# Patient Record
Sex: FEMALE | Race: BLACK OR AFRICAN AMERICAN | Employment: OTHER | URBAN - METROPOLITAN AREA
[De-identification: names, ages, dates, MRNs, and addresses within clinical notes are randomized per-mention and may not be internally consistent; named-entity substitution may affect disease eponyms.]

---

## 2020-11-18 ENCOUNTER — APPOINTMENT (OUTPATIENT)
Dept: CT IMAGING | Age: 65
DRG: 025 | End: 2020-11-18
Payer: MEDICARE

## 2020-11-18 ENCOUNTER — ANESTHESIA EVENT (OUTPATIENT)
Dept: OPERATING ROOM | Age: 65
DRG: 025 | End: 2020-11-18
Payer: MEDICARE

## 2020-11-18 ENCOUNTER — HOSPITAL ENCOUNTER (INPATIENT)
Age: 65
LOS: 12 days | Discharge: NURSING, REHAB OR LONG-TERM CARE | DRG: 025 | End: 2020-11-30
Attending: EMERGENCY MEDICINE | Admitting: HOSPITALIST
Payer: MEDICARE

## 2020-11-18 ENCOUNTER — APPOINTMENT (OUTPATIENT)
Dept: GENERAL RADIOLOGY | Age: 65
DRG: 025 | End: 2020-11-18
Payer: MEDICARE

## 2020-11-18 ENCOUNTER — ANESTHESIA (OUTPATIENT)
Dept: OPERATING ROOM | Age: 65
DRG: 025 | End: 2020-11-18
Payer: MEDICARE

## 2020-11-18 VITALS — OXYGEN SATURATION: 100 % | SYSTOLIC BLOOD PRESSURE: 110 MMHG | DIASTOLIC BLOOD PRESSURE: 70 MMHG | TEMPERATURE: 93.4 F

## 2020-11-18 PROBLEM — S06.5XAA SUBDURAL HEMATOMA: Status: ACTIVE | Noted: 2020-11-18

## 2020-11-18 PROBLEM — R56.9 SEIZURE (HCC): Status: ACTIVE | Noted: 2020-11-18

## 2020-11-18 LAB
ALBUMIN SERPL-MCNC: 3.5 G/DL (ref 3.5–5.2)
ALP BLD-CCNC: 64 U/L (ref 35–104)
ALT SERPL-CCNC: 25 U/L (ref 0–32)
ANION GAP SERPL CALCULATED.3IONS-SCNC: 10 MMOL/L (ref 7–16)
ANISOCYTOSIS: ABNORMAL
APTT: 34.8 SEC (ref 24.5–35.1)
AST SERPL-CCNC: 29 U/L (ref 0–31)
BASOPHILS ABSOLUTE: 0 E9/L (ref 0–0.2)
BASOPHILS RELATIVE PERCENT: 0.3 % (ref 0–2)
BILIRUB SERPL-MCNC: 0.5 MG/DL (ref 0–1.2)
BUN BLDV-MCNC: 16 MG/DL (ref 8–23)
CALCIUM SERPL-MCNC: 10 MG/DL (ref 8.6–10.2)
CHLORIDE BLD-SCNC: 104 MMOL/L (ref 98–107)
CO2: 25 MMOL/L (ref 22–29)
CREAT SERPL-MCNC: 0.6 MG/DL (ref 0.5–1)
EKG ATRIAL RATE: 76 BPM
EKG P AXIS: 77 DEGREES
EKG P-R INTERVAL: 192 MS
EKG Q-T INTERVAL: 334 MS
EKG QRS DURATION: 66 MS
EKG QTC CALCULATION (BAZETT): 375 MS
EKG R AXIS: 19 DEGREES
EKG T AXIS: 86 DEGREES
EKG VENTRICULAR RATE: 76 BPM
EOSINOPHILS ABSOLUTE: 0 E9/L (ref 0.05–0.5)
EOSINOPHILS RELATIVE PERCENT: 0 % (ref 0–6)
GFR AFRICAN AMERICAN: >60
GFR NON-AFRICAN AMERICAN: >60 ML/MIN/1.73
GLUCOSE BLD-MCNC: 101 MG/DL (ref 74–99)
HCT VFR BLD CALC: 37.9 % (ref 34–48)
HEMOGLOBIN: 12.8 G/DL (ref 11.5–15.5)
HYPOCHROMIA: ABNORMAL
INR BLD: 1
LYMPHOCYTES ABSOLUTE: 0.26 E9/L (ref 1.5–4)
LYMPHOCYTES RELATIVE PERCENT: 3.5 % (ref 20–42)
MCH RBC QN AUTO: 31.1 PG (ref 26–35)
MCHC RBC AUTO-ENTMCNC: 33.8 % (ref 32–34.5)
MCV RBC AUTO: 92.2 FL (ref 80–99.9)
MONOCYTES ABSOLUTE: 0.2 E9/L (ref 0.1–0.95)
MONOCYTES RELATIVE PERCENT: 2.6 % (ref 2–12)
NEUTROPHILS ABSOLUTE: 6.11 E9/L (ref 1.8–7.3)
NEUTROPHILS RELATIVE PERCENT: 93.9 % (ref 43–80)
PDW BLD-RTO: 13 FL (ref 11.5–15)
PLATELET # BLD: 115 E9/L (ref 130–450)
PMV BLD AUTO: 11.8 FL (ref 7–12)
POTASSIUM SERPL-SCNC: 4.2 MMOL/L (ref 3.5–5)
PROTHROMBIN TIME: 10.8 SEC (ref 9.3–12.4)
RBC # BLD: 4.11 E12/L (ref 3.5–5.5)
REASON FOR REJECTION: NORMAL
REJECTED TEST: NORMAL
SODIUM BLD-SCNC: 139 MMOL/L (ref 132–146)
TOTAL PROTEIN: 6.3 G/DL (ref 6.4–8.3)
TROPONIN: <0.01 NG/ML (ref 0–0.03)
WBC # BLD: 6.5 E9/L (ref 4.5–11.5)

## 2020-11-18 PROCEDURE — 80053 COMPREHEN METABOLIC PANEL: CPT

## 2020-11-18 PROCEDURE — 3700000001 HC ADD 15 MINUTES (ANESTHESIA): Performed by: NEUROLOGICAL SURGERY

## 2020-11-18 PROCEDURE — 85025 COMPLETE CBC W/AUTO DIFF WBC: CPT

## 2020-11-18 PROCEDURE — 3600000005 HC SURGERY LEVEL 5 BASE: Performed by: NEUROLOGICAL SURGERY

## 2020-11-18 PROCEDURE — 2580000003 HC RX 258: Performed by: NURSE ANESTHETIST, CERTIFIED REGISTERED

## 2020-11-18 PROCEDURE — 6360000002 HC RX W HCPCS

## 2020-11-18 PROCEDURE — 2580000003 HC RX 258: Performed by: STUDENT IN AN ORGANIZED HEALTH CARE EDUCATION/TRAINING PROGRAM

## 2020-11-18 PROCEDURE — 7100000001 HC PACU RECOVERY - ADDTL 15 MIN: Performed by: NEUROLOGICAL SURGERY

## 2020-11-18 PROCEDURE — 71045 X-RAY EXAM CHEST 1 VIEW: CPT

## 2020-11-18 PROCEDURE — 3700000000 HC ANESTHESIA ATTENDED CARE: Performed by: NEUROLOGICAL SURGERY

## 2020-11-18 PROCEDURE — 2709999900 HC NON-CHARGEABLE SUPPLY: Performed by: NEUROLOGICAL SURGERY

## 2020-11-18 PROCEDURE — 99291 CRITICAL CARE FIRST HOUR: CPT | Performed by: SURGERY

## 2020-11-18 PROCEDURE — 61154 BURR HOLE W/EVAC&/DRG HMTMA: CPT | Performed by: NEUROLOGICAL SURGERY

## 2020-11-18 PROCEDURE — 2000000000 HC ICU R&B

## 2020-11-18 PROCEDURE — 6360000002 HC RX W HCPCS: Performed by: EMERGENCY MEDICINE

## 2020-11-18 PROCEDURE — 6360000002 HC RX W HCPCS: Performed by: PHYSICIAN ASSISTANT

## 2020-11-18 PROCEDURE — 6360000002 HC RX W HCPCS: Performed by: NURSE ANESTHETIST, CERTIFIED REGISTERED

## 2020-11-18 PROCEDURE — 99222 1ST HOSP IP/OBS MODERATE 55: CPT | Performed by: SURGERY

## 2020-11-18 PROCEDURE — 84484 ASSAY OF TROPONIN QUANT: CPT

## 2020-11-18 PROCEDURE — 85610 PROTHROMBIN TIME: CPT

## 2020-11-18 PROCEDURE — 36415 COLL VENOUS BLD VENIPUNCTURE: CPT

## 2020-11-18 PROCEDURE — 99285 EMERGENCY DEPT VISIT HI MDM: CPT

## 2020-11-18 PROCEDURE — 2580000003 HC RX 258: Performed by: PHYSICIAN ASSISTANT

## 2020-11-18 PROCEDURE — 93005 ELECTROCARDIOGRAM TRACING: CPT | Performed by: EMERGENCY MEDICINE

## 2020-11-18 PROCEDURE — 93010 ELECTROCARDIOGRAM REPORT: CPT | Performed by: INTERNAL MEDICINE

## 2020-11-18 PROCEDURE — 3600000015 HC SURGERY LEVEL 5 ADDTL 15MIN: Performed by: NEUROLOGICAL SURGERY

## 2020-11-18 PROCEDURE — C1729 CATH, DRAINAGE: HCPCS | Performed by: NEUROLOGICAL SURGERY

## 2020-11-18 PROCEDURE — 72125 CT NECK SPINE W/O DYE: CPT

## 2020-11-18 PROCEDURE — 2500000003 HC RX 250 WO HCPCS: Performed by: NEUROLOGICAL SURGERY

## 2020-11-18 PROCEDURE — C1713 ANCHOR/SCREW BN/BN,TIS/BN: HCPCS | Performed by: NEUROLOGICAL SURGERY

## 2020-11-18 PROCEDURE — 85730 THROMBOPLASTIN TIME PARTIAL: CPT

## 2020-11-18 PROCEDURE — 99222 1ST HOSP IP/OBS MODERATE 55: CPT | Performed by: NEUROLOGICAL SURGERY

## 2020-11-18 PROCEDURE — 009430Z DRAINAGE OF INTRACRANIAL SUBDURAL SPACE WITH DRAINAGE DEVICE, PERCUTANEOUS APPROACH: ICD-10-PCS | Performed by: NEUROLOGICAL SURGERY

## 2020-11-18 PROCEDURE — 2500000003 HC RX 250 WO HCPCS: Performed by: NURSE ANESTHETIST, CERTIFIED REGISTERED

## 2020-11-18 PROCEDURE — 70450 CT HEAD/BRAIN W/O DYE: CPT

## 2020-11-18 PROCEDURE — 72170 X-RAY EXAM OF PELVIS: CPT

## 2020-11-18 PROCEDURE — 7100000000 HC PACU RECOVERY - FIRST 15 MIN: Performed by: NEUROLOGICAL SURGERY

## 2020-11-18 PROCEDURE — 96365 THER/PROPH/DIAG IV INF INIT: CPT

## 2020-11-18 DEVICE — BURR HOLE COVER PLATE WITH TAB, 14MM
Type: IMPLANTABLE DEVICE | Site: CRANIAL | Status: FUNCTIONAL
Brand: UNIVERSAL NEURO 2

## 2020-11-18 DEVICE — LOW PROFILE BURR HOLE COVER, W/TAB, 10MM
Type: IMPLANTABLE DEVICE | Site: CRANIAL | Status: FUNCTIONAL
Brand: UNIVERSAL NEURO 2

## 2020-11-18 DEVICE — SCREW UN3 SLFTP 1.5X4MM: Type: IMPLANTABLE DEVICE | Site: CRANIAL | Status: FUNCTIONAL

## 2020-11-18 RX ORDER — LABETALOL HYDROCHLORIDE 5 MG/ML
5 INJECTION, SOLUTION INTRAVENOUS EVERY 10 MIN PRN
Status: DISCONTINUED | OUTPATIENT
Start: 2020-11-18 | End: 2020-11-18

## 2020-11-18 RX ORDER — SODIUM CHLORIDE 0.9 % (FLUSH) 0.9 %
10 SYRINGE (ML) INJECTION EVERY 12 HOURS SCHEDULED
Status: DISCONTINUED | OUTPATIENT
Start: 2020-11-18 | End: 2020-11-30 | Stop reason: HOSPADM

## 2020-11-18 RX ORDER — PROMETHAZINE HYDROCHLORIDE 25 MG/1
12.5 TABLET ORAL EVERY 6 HOURS PRN
Status: DISCONTINUED | OUTPATIENT
Start: 2020-11-18 | End: 2020-11-18 | Stop reason: SDUPTHER

## 2020-11-18 RX ORDER — MEMANTINE HYDROCHLORIDE 10 MG/1
10 TABLET ORAL 2 TIMES DAILY
COMMUNITY

## 2020-11-18 RX ORDER — POLYETHYLENE GLYCOL 3350 17 G/17G
17 POWDER, FOR SOLUTION ORAL DAILY PRN
Status: DISCONTINUED | OUTPATIENT
Start: 2020-11-18 | End: 2020-11-22

## 2020-11-18 RX ORDER — LEVETIRACETAM 750 MG/1
750 TABLET ORAL 2 TIMES DAILY
COMMUNITY

## 2020-11-18 RX ORDER — ONDANSETRON 2 MG/ML
4 INJECTION INTRAMUSCULAR; INTRAVENOUS EVERY 6 HOURS PRN
Status: DISCONTINUED | OUTPATIENT
Start: 2020-11-18 | End: 2020-11-18 | Stop reason: SDUPTHER

## 2020-11-18 RX ORDER — SODIUM CHLORIDE 9 MG/ML
INJECTION, SOLUTION INTRAVENOUS CONTINUOUS PRN
Status: DISCONTINUED | OUTPATIENT
Start: 2020-11-18 | End: 2020-11-18 | Stop reason: SDUPTHER

## 2020-11-18 RX ORDER — PROPOFOL 10 MG/ML
INJECTION, EMULSION INTRAVENOUS PRN
Status: DISCONTINUED | OUTPATIENT
Start: 2020-11-18 | End: 2020-11-18 | Stop reason: SDUPTHER

## 2020-11-18 RX ORDER — ROCURONIUM BROMIDE 10 MG/ML
INJECTION, SOLUTION INTRAVENOUS PRN
Status: DISCONTINUED | OUTPATIENT
Start: 2020-11-18 | End: 2020-11-18 | Stop reason: SDUPTHER

## 2020-11-18 RX ORDER — PROMETHAZINE HYDROCHLORIDE 25 MG/ML
6.25 INJECTION, SOLUTION INTRAMUSCULAR; INTRAVENOUS
Status: DISCONTINUED | OUTPATIENT
Start: 2020-11-18 | End: 2020-11-18

## 2020-11-18 RX ORDER — LEVETIRACETAM 5 MG/ML
500 INJECTION INTRAVASCULAR ONCE
Status: COMPLETED | OUTPATIENT
Start: 2020-11-18 | End: 2020-11-18

## 2020-11-18 RX ORDER — SODIUM CHLORIDE 9 MG/ML
INJECTION, SOLUTION INTRAVENOUS CONTINUOUS
Status: DISCONTINUED | OUTPATIENT
Start: 2020-11-18 | End: 2020-11-30 | Stop reason: HOSPADM

## 2020-11-18 RX ORDER — SODIUM CHLORIDE 0.9 % (FLUSH) 0.9 %
10 SYRINGE (ML) INJECTION PRN
Status: DISCONTINUED | OUTPATIENT
Start: 2020-11-18 | End: 2020-11-18 | Stop reason: SDUPTHER

## 2020-11-18 RX ORDER — LORAZEPAM 1 MG/1
1 TABLET ORAL 2 TIMES DAILY PRN
COMMUNITY

## 2020-11-18 RX ORDER — FENTANYL CITRATE 50 UG/ML
INJECTION, SOLUTION INTRAMUSCULAR; INTRAVENOUS PRN
Status: DISCONTINUED | OUTPATIENT
Start: 2020-11-18 | End: 2020-11-18 | Stop reason: SDUPTHER

## 2020-11-18 RX ORDER — SODIUM CHLORIDE 0.9 % (FLUSH) 0.9 %
10 SYRINGE (ML) INJECTION EVERY 12 HOURS SCHEDULED
Status: DISCONTINUED | OUTPATIENT
Start: 2020-11-18 | End: 2020-11-18

## 2020-11-18 RX ORDER — ACETAMINOPHEN 325 MG/1
650 TABLET ORAL EVERY 4 HOURS
Status: DISCONTINUED | OUTPATIENT
Start: 2020-11-18 | End: 2020-11-22

## 2020-11-18 RX ORDER — NEOSTIGMINE METHYLSULFATE 1 MG/ML
INJECTION, SOLUTION INTRAVENOUS PRN
Status: DISCONTINUED | OUTPATIENT
Start: 2020-11-18 | End: 2020-11-18 | Stop reason: SDUPTHER

## 2020-11-18 RX ORDER — MORPHINE SULFATE 2 MG/ML
2 INJECTION, SOLUTION INTRAMUSCULAR; INTRAVENOUS EVERY 5 MIN PRN
Status: DISCONTINUED | OUTPATIENT
Start: 2020-11-18 | End: 2020-11-18

## 2020-11-18 RX ORDER — GLYCOPYRROLATE 1 MG/5 ML
SYRINGE (ML) INTRAVENOUS PRN
Status: DISCONTINUED | OUTPATIENT
Start: 2020-11-18 | End: 2020-11-18 | Stop reason: SDUPTHER

## 2020-11-18 RX ORDER — MEPERIDINE HYDROCHLORIDE 25 MG/ML
12.5 INJECTION INTRAMUSCULAR; INTRAVENOUS; SUBCUTANEOUS EVERY 5 MIN PRN
Status: DISCONTINUED | OUTPATIENT
Start: 2020-11-18 | End: 2020-11-18

## 2020-11-18 RX ORDER — LORAZEPAM 1 MG/1
1 TABLET ORAL 2 TIMES DAILY PRN
Status: DISCONTINUED | OUTPATIENT
Start: 2020-11-18 | End: 2020-11-30 | Stop reason: HOSPADM

## 2020-11-18 RX ORDER — ONDANSETRON 2 MG/ML
INJECTION INTRAMUSCULAR; INTRAVENOUS PRN
Status: DISCONTINUED | OUTPATIENT
Start: 2020-11-18 | End: 2020-11-18 | Stop reason: SDUPTHER

## 2020-11-18 RX ORDER — LEVETIRACETAM 15 MG/ML
750 INJECTION INTRAVASCULAR EVERY 12 HOURS
Status: DISCONTINUED | OUTPATIENT
Start: 2020-11-18 | End: 2020-11-22

## 2020-11-18 RX ORDER — SODIUM CHLORIDE 0.9 % (FLUSH) 0.9 %
10 SYRINGE (ML) INJECTION PRN
Status: DISCONTINUED | OUTPATIENT
Start: 2020-11-18 | End: 2020-11-30 | Stop reason: HOSPADM

## 2020-11-18 RX ORDER — DONEPEZIL HYDROCHLORIDE 5 MG/1
10 TABLET, FILM COATED ORAL NIGHTLY
Status: DISCONTINUED | OUTPATIENT
Start: 2020-11-18 | End: 2020-11-22

## 2020-11-18 RX ORDER — ATORVASTATIN CALCIUM 80 MG/1
80 TABLET, FILM COATED ORAL NIGHTLY
Status: DISCONTINUED | OUTPATIENT
Start: 2020-11-18 | End: 2020-11-22

## 2020-11-18 RX ORDER — ONDANSETRON 2 MG/ML
4 INJECTION INTRAMUSCULAR; INTRAVENOUS EVERY 6 HOURS PRN
Status: DISCONTINUED | OUTPATIENT
Start: 2020-11-18 | End: 2020-11-30 | Stop reason: HOSPADM

## 2020-11-18 RX ORDER — MEMANTINE HYDROCHLORIDE 10 MG/1
10 TABLET ORAL 2 TIMES DAILY
Status: DISCONTINUED | OUTPATIENT
Start: 2020-11-18 | End: 2020-11-22

## 2020-11-18 RX ORDER — CEFAZOLIN SODIUM 1 G/3ML
INJECTION, POWDER, FOR SOLUTION INTRAMUSCULAR; INTRAVENOUS PRN
Status: DISCONTINUED | OUTPATIENT
Start: 2020-11-18 | End: 2020-11-18 | Stop reason: SDUPTHER

## 2020-11-18 RX ORDER — PROMETHAZINE HYDROCHLORIDE 25 MG/1
12.5 TABLET ORAL EVERY 6 HOURS PRN
Status: DISCONTINUED | OUTPATIENT
Start: 2020-11-18 | End: 2020-11-30 | Stop reason: HOSPADM

## 2020-11-18 RX ORDER — ACETAMINOPHEN 325 MG/1
650 TABLET ORAL EVERY 4 HOURS PRN
Status: DISCONTINUED | OUTPATIENT
Start: 2020-11-18 | End: 2020-11-22

## 2020-11-18 RX ORDER — HYDRALAZINE HYDROCHLORIDE 20 MG/ML
5 INJECTION INTRAMUSCULAR; INTRAVENOUS EVERY 10 MIN PRN
Status: DISCONTINUED | OUTPATIENT
Start: 2020-11-18 | End: 2020-11-18

## 2020-11-18 RX ORDER — SODIUM CHLORIDE 9 MG/ML
INJECTION, SOLUTION INTRAVENOUS CONTINUOUS
Status: DISCONTINUED | OUTPATIENT
Start: 2020-11-18 | End: 2020-11-18

## 2020-11-18 RX ORDER — LIDOCAINE HYDROCHLORIDE AND EPINEPHRINE 5; 5 MG/ML; UG/ML
INJECTION, SOLUTION INFILTRATION; PERINEURAL PRN
Status: DISCONTINUED | OUTPATIENT
Start: 2020-11-18 | End: 2020-11-18 | Stop reason: HOSPADM

## 2020-11-18 RX ORDER — SUCCINYLCHOLINE/SOD CL,ISO/PF 200MG/10ML
SYRINGE (ML) INTRAVENOUS PRN
Status: DISCONTINUED | OUTPATIENT
Start: 2020-11-18 | End: 2020-11-18 | Stop reason: SDUPTHER

## 2020-11-18 RX ORDER — DONEPEZIL HYDROCHLORIDE 10 MG/1
10 TABLET, FILM COATED ORAL NIGHTLY
COMMUNITY

## 2020-11-18 RX ORDER — EPHEDRINE SULFATE/0.9% NACL/PF 50 MG/5 ML
SYRINGE (ML) INTRAVENOUS PRN
Status: DISCONTINUED | OUTPATIENT
Start: 2020-11-18 | End: 2020-11-18 | Stop reason: SDUPTHER

## 2020-11-18 RX ORDER — LEVETIRACETAM 500 MG/1
1000 TABLET ORAL 2 TIMES DAILY
Status: DISCONTINUED | OUTPATIENT
Start: 2020-11-18 | End: 2020-11-18 | Stop reason: SDUPTHER

## 2020-11-18 RX ORDER — DEXAMETHASONE SODIUM PHOSPHATE 10 MG/ML
INJECTION, SOLUTION INTRAMUSCULAR; INTRAVENOUS PRN
Status: DISCONTINUED | OUTPATIENT
Start: 2020-11-18 | End: 2020-11-18 | Stop reason: SDUPTHER

## 2020-11-18 RX ADMIN — PHENYLEPHRINE HYDROCHLORIDE 100 MCG: 10 INJECTION INTRAVENOUS at 13:40

## 2020-11-18 RX ADMIN — SODIUM CHLORIDE: 9 INJECTION, SOLUTION INTRAVENOUS at 17:03

## 2020-11-18 RX ADMIN — PHENYLEPHRINE HYDROCHLORIDE 200 MCG: 10 INJECTION INTRAVENOUS at 13:02

## 2020-11-18 RX ADMIN — ONDANSETRON HYDROCHLORIDE 4 MG: 2 INJECTION, SOLUTION INTRAMUSCULAR; INTRAVENOUS at 13:55

## 2020-11-18 RX ADMIN — Medication 10 ML: at 22:18

## 2020-11-18 RX ADMIN — PROPOFOL 100 MG: 10 INJECTION, EMULSION INTRAVENOUS at 12:57

## 2020-11-18 RX ADMIN — Medication 140 MG: at 12:57

## 2020-11-18 RX ADMIN — FENTANYL CITRATE 100 MCG: 50 INJECTION, SOLUTION INTRAMUSCULAR; INTRAVENOUS at 12:57

## 2020-11-18 RX ADMIN — DEXAMETHASONE SODIUM PHOSPHATE 10 MG: 10 INJECTION, SOLUTION INTRAMUSCULAR; INTRAVENOUS at 13:04

## 2020-11-18 RX ADMIN — Medication 0.6 MG: at 13:50

## 2020-11-18 RX ADMIN — ROCURONIUM BROMIDE 40 MG: 10 INJECTION, SOLUTION INTRAVENOUS at 13:14

## 2020-11-18 RX ADMIN — Medication 2 G: at 22:12

## 2020-11-18 RX ADMIN — LEVETIRACETAM 750 MG: 15 INJECTION INTRAVENOUS at 15:14

## 2020-11-18 RX ADMIN — Medication 10 MG: at 13:05

## 2020-11-18 RX ADMIN — Medication 3 MG: at 13:50

## 2020-11-18 RX ADMIN — LEVETIRACETAM 500 MG: 5 INJECTION INTRAVENOUS at 02:26

## 2020-11-18 RX ADMIN — CEFAZOLIN 2000 MG: 1 INJECTION, POWDER, FOR SOLUTION INTRAMUSCULAR; INTRAVENOUS at 13:18

## 2020-11-18 RX ADMIN — PHENYLEPHRINE HYDROCHLORIDE 100 MCG: 10 INJECTION INTRAVENOUS at 13:49

## 2020-11-18 RX ADMIN — PHENYLEPHRINE HYDROCHLORIDE 200 MCG: 10 INJECTION INTRAVENOUS at 13:00

## 2020-11-18 RX ADMIN — PHENYLEPHRINE HYDROCHLORIDE 200 MCG: 10 INJECTION INTRAVENOUS at 13:12

## 2020-11-18 RX ADMIN — SODIUM CHLORIDE: 9 INJECTION, SOLUTION INTRAVENOUS at 12:47

## 2020-11-18 ASSESSMENT — PULMONARY FUNCTION TESTS
PIF_VALUE: 18
PIF_VALUE: 15
PIF_VALUE: 6
PIF_VALUE: 5
PIF_VALUE: 19
PIF_VALUE: 18
PIF_VALUE: 21
PIF_VALUE: 19
PIF_VALUE: 19
PIF_VALUE: 17
PIF_VALUE: 15
PIF_VALUE: 19
PIF_VALUE: 15
PIF_VALUE: 1
PIF_VALUE: 15
PIF_VALUE: 1
PIF_VALUE: 18
PIF_VALUE: 19
PIF_VALUE: 15
PIF_VALUE: 2
PIF_VALUE: 1
PIF_VALUE: 19
PIF_VALUE: 18
PIF_VALUE: 1
PIF_VALUE: 0
PIF_VALUE: 18
PIF_VALUE: 15
PIF_VALUE: 15
PIF_VALUE: 1
PIF_VALUE: 15
PIF_VALUE: 18
PIF_VALUE: 2
PIF_VALUE: 0
PIF_VALUE: 2
PIF_VALUE: 15
PIF_VALUE: 1
PIF_VALUE: 15
PIF_VALUE: 0
PIF_VALUE: 15
PIF_VALUE: 17
PIF_VALUE: 6
PIF_VALUE: 17
PIF_VALUE: 1
PIF_VALUE: 15
PIF_VALUE: 18
PIF_VALUE: 11
PIF_VALUE: 19
PIF_VALUE: 18
PIF_VALUE: 19
PIF_VALUE: 1
PIF_VALUE: 1
PIF_VALUE: 16
PIF_VALUE: 19
PIF_VALUE: 15
PIF_VALUE: 15
PIF_VALUE: 19
PIF_VALUE: 19
PIF_VALUE: 0
PIF_VALUE: 5
PIF_VALUE: 19
PIF_VALUE: 2
PIF_VALUE: 15
PIF_VALUE: 15
PIF_VALUE: 18
PIF_VALUE: 18
PIF_VALUE: 15
PIF_VALUE: 5
PIF_VALUE: 19
PIF_VALUE: 15
PIF_VALUE: 19
PIF_VALUE: 2
PIF_VALUE: 19
PIF_VALUE: 15
PIF_VALUE: 16
PIF_VALUE: 18
PIF_VALUE: 1
PIF_VALUE: 18
PIF_VALUE: 15
PIF_VALUE: 1
PIF_VALUE: 15
PIF_VALUE: 17
PIF_VALUE: 0
PIF_VALUE: 5

## 2020-11-18 ASSESSMENT — PAIN SCALES - GENERAL
PAINLEVEL_OUTOF10: 0

## 2020-11-18 NOTE — PROGRESS NOTES
[facility]  SURGICAL INTENSIVE CARE UNIT (SICU)  ATTENDING PHYSICIAN CRITICAL CARE PROGRESS NOTE     I have examined the patient, reviewed the record, and discussed the case with the APN/ resident. Please refer to the APN/ resident's note. I agree with the assessment and plan. I have reviewed all relevant labs and imaging data. The following summarizes my clinical findings and independent assessment. CC:  Critical care management for [facility] Course/Overnight Events:  11/18--admitted after seizures and found to have SDH; underwent bethany hole crani    Pt seen/evaluated in PACU.     Non-verbal  Not following commands  Eyes open  Hrt:  Regular  Lungs:  Fairly clear bilaterally  Abd:  Soft; BS active; NT/ND  Skin:  Warm/dry  Drain with serosang drainage    Patient Active Problem List    Diagnosis Date Noted    Seizure [facility]) 11/18/2020    Subdural hematoma ([facility] [address] 11/18/2020       SDH--s/p bethany hole crani--monitor neuro exam  Seizure--on keppra  Check swallow eval  Monitor hemodynamics  Pain control  PT/OT evals  DVT risk--PCDs    Pt is at risk for neurologic deterioration and requires ongoing ICU care    Lady Wenceslao MD, FACS  11/18/2020  6:13 PM      Critical care time exclusive of teaching and procedures = 38 minutes

## 2020-11-18 NOTE — H&P
mm  Pupil reaction: Yes    Wiggles fingers: Left Yes Right Yes  Wiggles toes: Left Yes   Right Yes    Hand grasp:   Left  Weak      Right  Weak  Plantar flexion: Left  Absent      Right   Absent    Loss of consciousness:  Yes  History Obtained From:  Patient & EMS  Private Medical Doctor: Unknown    Pre-exisiting Medical History:  yes    Conditions: Baseline dementia with seizures    Medications: No anticoagulation    Allergies: No known    Social History:   Tobacco use: Unknown  Alcohol use: Unknown  Illicit drug use: Unknown    Past Surgical History: Unknown    Anticoagulant use:  No   Antiplatelet use:    No     NSAID use in last 72 hours: unknown  Taken PCN in past:  unknown  Last food/drink: Unknown  Last tetanus: Unknown    Family History:   Not pertinent to presenting problem. Complaints:   Head:  None  Neck:   None  Chest:   None  Back:   None  Abdomen:   None  Extremities:   None  Comments:     Review of systems:  All negative unless otherwise noted. SECONDARY SURVEY  Head/scalp: Atraumatic    Face: Atraumatic    Eyes/ears/nose: Atraumatic    Pharynx/mouth: Atraumatic    Neck: Atraumatic     Cervical spine tenderness:   Cervical collar in place at time of arrival  Pain:  none  ROM:  Not indicated     Chest wall:  Atraumatic    Heart:  Regular rate & rhythm    Abdomen: Atraumatic. Soft ND  Tenderness:  none    Pelvis: Atraumatic  Tenderness: none    Thoracolumbar spine: Atraumatic  Tenderness:  none    Genitourinary:  Atraumatic. No blood or urine noted    Rectum: Atraumatic. No blood noted. Perineum: Atraumatic. No blood or urine noted.       Extremities:   Sensory normal  Motor normal    Distal Pulses  Left arm normal  Right arm normal  Left leg normal  Right leg normal    Capillary refill  Left arm normal  Right arm normal  Left leg normal  Right leg normal    Procedures in ED:  None    In the event of Emergency Blood Transfusion:  Due to the critical condition of this patient, I

## 2020-11-18 NOTE — DISCHARGE INSTR - COC
Continuity of Care Form    Patient Name: Carlos Monroe   :  [birth year]  MRN:  [id number]    Admit date:  2020  Discharge date:  ***20    Code Status Order: No Order   Advance Directives:      Admitting Physician:  Andrew Solis MD  PCP: No primary care provider on file. Discharging Nurse: Todd Lam RN  [address] Unit/Room#: [address]  Discharging Unit Phone Number: ***[phone]    Emergency Contact:   Extended Emergency Contact Information  Primary Emergency Contact: Francy Escalona  Address: [place] [address] Phone: [phone]  Relation: Brother/Sister  Secondary Emergency Contact: Janki Henry  Address: [address], [address] Phone: [phone]  Relation: Brother/Sister    Past Surgical History:  No past surgical history on file. Immunization History: There is no immunization history on file for this patient.     Active Problems:  Patient Active Problem List   Diagnosis Code    Seizure ([facility] [address] R56.9    Subdural hematoma ([facility] [address] S06.5X9A       Isolation/Infection:   Isolation            No Isolation          Patient Infection Status       None to display            Nurse Assessment:  Last Vital Signs: /60   Pulse 75   Temp 96.8 °F (36 °C) (Temporal)   Resp 18   Ht 5' 4\" (1.626 m)   Wt 137 lb (62.1 kg)   SpO2 94%   BMI 23.52 kg/m²     Last documented pain score (0-10 scale):    Last Weight:   Wt Readings from Last 1 Encounters:   20 135 lb (61.2 kg)     Mental Status:  disoriented    IV Access:  - None    Nursing Mobility/ADLs:  Walking   Assisted  Transfer  Assisted  Bathing  Dependent  Dressing  Dependent  Toileting  Dependent  Feeding  Dependent  Med Admin  Assisted  Med Delivery   crushed and prefers mixed with applesauce    Wound Care Documentation and Therapy:        Elimination:  Continence:   · Bowel: No  · Bladder: No  Urinary Catheter: None Colostomy/Ileostomy/Ileal Conduit: No       Date of Last BM: ***11/30/20    Intake/Output Summary (Last 24 hours) at 11/18/2020 1412  Last data filed at 11/18/2020 1353  Gross per 24 hour   Intake --   Output 300 ml   Net -300 ml     No intake/output data recorded. Safety Concerns: At Risk for Falls and History of Seizures    Impairments/Disabilities:      {[facility] Impairments/Disabilities:[id number]} some expressive aphasia  Nutrition Therapy:  Current Nutrition Therapy:   - Oral Diet:  Dysphagia 1 pureed  NEEDS FED. Routes of Feeding: Oral  Liquids: Mildly thick nectar liquids  Daily Fluid Restriction: no  Last Modified Barium Swallow with Video (Video Swallowing Test): not done    Treatments at the Time of Hospital Discharge:   Respiratory Treatments: ***  Oxygen Therapy:  is not on home oxygen therapy. Ventilator:    - No ventilator support    Rehab Therapies: Physical Therapy and Occupational Therapy  Weight Bearing Status/Restrictions: No weight bearing restirctions  Other Medical Equipment (for information only, NOT a DME order):  walker  Other Treatments: ***    Patient's personal belongings (please select all that are sent with patient):  {[facility] DME Belongings:[id number]}    RN SIGNATURE:  {Esignature:[id number]}ALFREDO LEACH RN    CASE MANAGEMENT/SOCIAL WORK SECTION    Inpatient Status Date: ***    Readmission Risk Assessment Score:  Readmission Risk              Risk of Unplanned Readmission:        9           Discharging to Facility/ Agency   · Name:   · Address:  · Phone:  · Fax:    Dialysis Facility (if applicable)   · Name:  · Address:  · Dialysis Schedule:  · Phone:  · Fax:    / signature: {Esignature:[id number]}    PHYSICIAN SECTION    Prognosis: Fair    Condition at Discharge: Stable    Rehab Potential (if transferring to Rehab): Fair    Recommended Labs or Other Treatments After Discharge: Follow up with neurosurgery as scheduled.   Pureed solids, nectar thick liquids. BMP in 1 week. Physician Certification: I certify the above information and transfer of Pipe Fenton  is necessary for the continuing treatment of the diagnosis listed and that she requires formerly [facility] for greater 30 days.      Update Admission H&P: No change in H&P    PHYSICIAN SIGNATURE:  Electronically signed by MINA Luna NP on 11/30/20 at 7:19 AM EST

## 2020-11-18 NOTE — PROGRESS NOTES
Patient extubated to 6L O2 simple mask by Dr. Maverick Lockett. No issues with extubation. Will continue to monitor.

## 2020-11-18 NOTE — PROGRESS NOTES
Dr. Parisa Stephens, myself and SHIVANI Corley RN spoke with daughter Maegan Bae [phone].  Treatment consent signed and placed in chart

## 2020-11-18 NOTE — ED PROVIDER NOTES
HPI:  11/18/20,   Time: 5:10 AM KOMAL Castellanos Chi is a 72 y.o. female presenting to the ED for fall, beginning 1 week ago. The complaint has been intermittent, severe in severity, and worsened by nothing. The patient was transferred from [facility] for a subdural hematoma. Per reports the patient fell approximately 1 week ago and today the patient had some intermittent confusion and a witnessed seizure. The patient does have a history of seizures but due to her seizure she was taken to the emergency department. The patient was given Keppra with no further seizures and transferred to our facility for trauma evaluation. Review of Systems:   Unable to be obtained due to patient's mental status        --------------------------------------------- PAST HISTORY ---------------------------------------------  Past Medical History:  has no past medical history on file. Past Surgical History:  has no past surgical history on file. Social History:  reports that she has never smoked. She has never used smokeless tobacco.    Family History: family history is not on file. The patients home medications have been reviewed. Allergies: Patient has no known allergies. ---------------------------------------------------PHYSICAL EXAM--------------------------------------    Constitutional/General: Alert , no acute distress  Head: Normocephalic and atraumatic  Eyes: PERRL, EOMI, conjunctive normal, sclera non icteric  Mouth: Oropharynx clear, handling secretions, no trismus, no asymmetry of the posterior oropharynx or uvular edema  Neck:c-collar placed non tender to palpation in the midline, no stridor, no crepitus, no meningeal signs  Respiratory: Lungs clear to auscultation bilaterally, no wheezes, rales, or rhonchi. Not in respiratory distress  Cardiovascular:  Regular rate. Regular rhythm. No murmurs, gallops, or rubs. 2+ distal pulses  GI:  Abdomen Soft, Non tender, Non distended. +BS. No organomegaly, no palpable masses,  No rebound, guarding, or rigidity. Musculoskeletal: Moves all extremities x 4. Warm and well perfused, no clubbing, cyanosis, or edema. Capillary refill <3 seconds  Integument: skin warm and dry. No rashes. Neurologic: Patient opens eyes to painful stimuli. Patient localizes pain, moves all extremities without difficulty, no focal deficits, symmetric strength 5/5 in the upper and lower extremities bilaterally    -------------------------------------------------- RESULTS -------------------------------------------------  I have personally reviewed all laboratory and imaging results for this patient. Results are listed below.      LABS:  Results for orders placed or performed during the hospital encounter of 11/18/20   CBC Auto Differential   Result Value Ref Range    WBC 6.5 4.5 - 11.5 E9/L    RBC 4.11 3.50 - 5.50 E12/L    Hemoglobin 12.8 11.5 - 15.5 g/dL    Hematocrit 37.9 34.0 - 48.0 %    MCV 92.2 80.0 - 99.9 fL    MCH 31.1 26.0 - 35.0 pg    MCHC 33.8 32.0 - 34.5 %    RDW 13.0 11.5 - 15.0 fL    Platelets 653 (L) 700 - 450 E9/L    MPV 11.8 7.0 - 12.0 fL    Neutrophils % 93.9 (H) 43.0 - 80.0 %    Lymphocytes % 3.5 (L) 20.0 - 42.0 %    Monocytes % 2.6 2.0 - 12.0 %    Eosinophils % 0.0 0.0 - 6.0 %    Basophils % 0.3 0.0 - 2.0 %    Neutrophils Absolute 6.11 1.80 - 7.30 E9/L    Lymphocytes Absolute 0.26 (L) 1.50 - 4.00 E9/L    Monocytes Absolute 0.20 0.10 - 0.95 E9/L    Eosinophils Absolute 0.00 (L) 0.05 - 0.50 E9/L    Basophils Absolute 0.00 0.00 - 0.20 E9/L    Anisocytosis 1+     Hypochromia 2+    SPECIMEN REJECTION   Result Value Ref Range    Rejected Test pt ptt trop cmp     Reason for Rejection see below    Protime-INR   Result Value Ref Range    Protime 10.8 9.3 - 12.4 sec    INR 1.0    APTT   Result Value Ref Range    aPTT 34.8 24.5 - 35.1 sec   Troponin   Result Value Ref Range    Troponin <0.01 0.00 - 0.03 ng/mL   COMPREHENSIVE METABOLIC PANEL   Result Value Ref Range Sodium 139 132 - 146 mmol/L    Potassium 4.2 3.5 - 5.0 mmol/L    Chloride 104 98 - 107 mmol/L    CO2 25 22 - 29 mmol/L    Anion Gap 10 7 - 16 mmol/L    Glucose 101 (H) 74 - 99 mg/dL    BUN 16 8 - 23 mg/dL    CREATININE 0.6 0.5 - 1.0 mg/dL    GFR Non-African American >60 >=60 mL/min/1.73    GFR African American >60     Calcium 10.0 8.6 - 10.2 mg/dL    Total Protein 6.3 (L) 6.4 - 8.3 g/dL    Alb 3.5 3.5 - 5.2 g/dL    Total Bilirubin 0.5 0.0 - 1.2 mg/dL    Alkaline Phosphatase 64 35 - 104 U/L    ALT 25 0 - 32 U/L    AST 29 0 - 31 U/L   EKG 12 Lead   Result Value Ref Range    Ventricular Rate 76 BPM    Atrial Rate 76 BPM    P-R Interval 192 ms    QRS Duration 66 ms    Q-T Interval 334 ms    QTc Calculation (Bazett) 375 ms    P Axis 77 degrees    R Axis 19 degrees    T Axis 86 degrees       RADIOLOGY:  Interpreted by Radiologist.  CT Head WO Contrast   Final Result   1. Large subdural collection on the left is either acute or acute on chronic. There is 7 mm of midline shift. 2. Acute left sphenoid sinusitis. Critical results were called by Dr. Srinivas Zavala MD to [facility]   on 11/18/2020 at 04:21. CT Cervical Spine WO Contrast   Final Result   No acute abnormality of the cervical spine. XR PELVIS (1-2 VIEWS)   Final Result   No fracture or malalignment. XR CHEST PORTABLE   Final Result   1. No acute traumatic abnormality identified. 2. Right perihilar nodule. Nonemergent routine chest CT scan is recommended   for further evaluation. EKG: This EKG is signed and interpreted by the EP. EKG shows normal sinus rhythm 76 bpm.  Normal axis. Normal QRS. Nonspecific ST-T wave changes noted. No STEMI.      ------------------------- NURSING NOTES AND VITALS REVIEWED ---------------------------   The nursing notes within the ED encounter and vital signs as below have been reviewed by myself.   /85   Pulse 66   Temp 98.2 °F (36.8 °C)   Resp 12   Ht 5' 4\" (1.626 m)   Wt 135 lb (61.2 kg)   SpO2 98%   BMI 23.17 kg/m²   Oxygen Saturation Interpretation: Normal    The patients available past medical records and past encounters were reviewed. ------------------------------ ED COURSE/MEDICAL DECISION MAKING----------------------  Medications   levetiracetam (KEPPRA) 500 mg/100 mL IVPB (0 mg Intravenous Stopped 11/18/20 0317)         ED COURSE:       Medical Decision Making: This is a 68-year-old female presented to the ED for a trauma consult after being seen at [facility] and diagnosed with subdural hematoma. Upon arrival to the ED the patient is somnolent but arousable. Once awake the patient will say simple words as well as moves all extremities with no focal deficits. Patient received 1 dose of Keppra prior to arrival and was given an additional 500 here in the emergency department. Blood pressure stable with no indication for acute treatment. Repeat head CT shows a large subdural collection with a 7 mm shift. CT neck on remarkable. Chest x-ray shows no traumatic injuries. Trauma surgery was consulted and evaluated the patient here in the emergency department. The patient be admitted to their service for further care. With observation here in the emergency department the patient's mental status as well as neuro exam is unchanged    I, Dr. Meenakshi Mohr, am the primary provider for this encounter    This patient's ED course included: a personal history and physicial examination, re-evaluation prior to disposition and multiple bedside re-evaluations    This patient has remained hemodynamically stable during their ED course. Re-Evaluations:             Re-evaluation. Patients symptoms show no change      Counseling: The emergency provider has spoken with the patient and discussed todays results, in addition to providing specific details for the plan of care and counseling regarding the diagnosis and prognosis.   Questions are answered at this time and they are agreeable with the plan.       --------------------------------- IMPRESSION AND DISPOSITION ---------------------------------    IMPRESSION  1. Injury of head, initial encounter    2. Fall, initial encounter    3. Subdural hematoma (HCC)        DISPOSITION  Disposition: Admit to telemetry  Patient condition is stable    NOTE: This report was transcribed using voice recognition software.  Every effort was made to ensure accuracy; however, inadvertent computerized transcription errors may be present        Pat Jasso DO  11/18/20 7373

## 2020-11-18 NOTE — ED NOTES
Pt resting in bed with eyes closed, no distress noted, opens eyes to voice, does not follow commands or answer questions, vss, call light in reach, safety maintained, will continue to monitor      Cassy Aguiar RN  11/18/20 68 Lucie Carney, RN  11/18/20 2143

## 2020-11-18 NOTE — ANESTHESIA PRE PROCEDURE
Department of Anesthesiology  Preprocedure Note       Name:  Rafaela Moya   Age:  72 y.o.  :  1955                                          MRN:  [id number]         Date:  2020      Surgeon: Betsy Hwang):  Daria Staples MD    Procedure: Procedure(s):  CRANIOTOMY BRANDIE HOLES    Medications prior to admission:   Prior to Admission medications    Medication Sig Start Date End Date Taking? Authorizing Provider   levETIRAcetam (KEPPRA) 750 MG tablet Take 750 mg by mouth 2 times daily   Yes Historical Provider, MD   LORazepam (ATIVAN) 1 MG tablet Take 1 mg by mouth 2 times daily as needed for Anxiety. Yes Historical Provider, MD   memantine (NAMENDA) 10 MG tablet Take 10 mg by mouth 2 times daily   Yes Historical Provider, MD   donepezil (ARICEPT) 10 MG tablet Take 10 mg by mouth nightly   Yes Historical Provider, MD       Current medications:    No current facility-administered medications for this encounter. Current Outpatient Medications   Medication Sig Dispense Refill    levETIRAcetam (KEPPRA) 750 MG tablet Take 750 mg by mouth 2 times daily      LORazepam (ATIVAN) 1 MG tablet Take 1 mg by mouth 2 times daily as needed for Anxiety.  memantine (NAMENDA) 10 MG tablet Take 10 mg by mouth 2 times daily      donepezil (ARICEPT) 10 MG tablet Take 10 mg by mouth nightly         Allergies:  No Known Allergies    Problem List:    Patient Active Problem List   Diagnosis Code    Seizure ([facility] [address] R56.9    Subdural hematoma ([facility] [address] N44.8I0L       Past Medical History:  No past medical history on file. Past Surgical History:  No past surgical history on file.     Social History:    Social History     Tobacco Use    Smoking status: Never Smoker    Smokeless tobacco: Never Used   Substance Use Topics    Alcohol use: Not on file                                Counseling given: Not Answered      Vital Signs (Current):   Vitals:    20 0124 20 0356 20 0637 20 0732   BP: 115/68 127/85 (!) 144/69 130/72   Pulse: 74 66 61 57   Resp: 13 12 16 17   Temp:   36.4 °C (97.5 °F)    SpO2: 96% 98% 97% 100%   Weight:       Height:                                                  BP Readings from Last 3 Encounters:   11/18/20 130/72       NPO Status:  SHYAM                                                                               BMI:   Wt Readings from Last 3 Encounters:   11/18/20 135 lb (61.2 kg)     Body mass index is 23.17 kg/m². CBC:   Lab Results   Component Value Date    WBC 6.5 11/18/2020    RBC 4.11 11/18/2020    HGB 12.8 11/18/2020    HCT 37.9 11/18/2020    MCV 92.2 11/18/2020    RDW 13.0 11/18/2020     11/18/2020       CMP:   Lab Results   Component Value Date     11/18/2020    K 4.2 11/18/2020     11/18/2020    CO2 25 11/18/2020    BUN 16 11/18/2020    CREATININE 0.6 11/18/2020    GFRAA >60 11/18/2020    LABGLOM >60 11/18/2020    GLUCOSE 101 11/18/2020    GLUCOSE 105 05/04/2012    PROT 6.3 11/18/2020    CALCIUM 10.0 11/18/2020    BILITOT 0.5 11/18/2020    ALKPHOS 64 11/18/2020    AST 29 11/18/2020    ALT 25 11/18/2020       POC Tests: No results for input(s): POCGLU, POCNA, POCK, POCCL, POCBUN, POCHEMO, POCHCT in the last 72 hours.     Coags:   Lab Results   Component Value Date    PROTIME 10.8 11/18/2020    INR 1.0 11/18/2020    APTT 34.8 11/18/2020       HCG (If Applicable): No results found for: PREGTESTUR, PREGSERUM, HCG, HCGQUANT     ABGs: No results found for: PHART, PO2ART, SZS8EOX, MCT4JGP, BEART, T3ZRUNAJ     Type & Screen (If Applicable):  No results found for: LABABO, LABRH    Drug/Infectious Status (If Applicable):  No results found for: HIV, HEPCAB    COVID-19 Screening (If Applicable): No results found for: COVID19      EKG 11/18/20    Ventricular Rate  76  BPM  Incomplete  11/18/2020  2:10 AM  [facility]    Atrial Rate  76  BPM  Incomplete  11/18/2020  2:10 AM  [facility]    P-R Interval  192  ms  Incomplete  11/18/2020  2:10 AM  [facility]    QRS Duration tolerance: good (>4 METS),         ECG reviewed  Rhythm: regular  Rate: normal           Beta Blocker:  Not on Beta Blocker         Neuro/Psych:   (+) seizures ( 2 seizures on 11/17, one lasting somewhere from 1 minute to 2 minutes per patient's daughter Phil Olivia. Believe it was related to her UTI. ): well controlled,              ROS comment: Hx: Dementia- per Janki (patient's daughter), patient is nonverbal and will not respond to strangers a lot of the time. 11/18/20 CT Head WO Contrast    Impression   1. Large subdural collection on the left is either acute or acute on chronic. There is 7 mm of midline shift. 2. Acute left sphenoid sinusitis. Pins in bilateral feet r/t bunion surgery GI/Hepatic/Renal: Neg GI/Hepatic/Renal ROS            Endo/Other:    (+) blood dyscrasia: thrombocytopenia and anemia, arthritis (Lumbar spine): OA., electrolyte abnormalities (hypokalemia), . Pt had no PAT visit       Abdominal:       Abdomen: soft. Vascular: negative vascular ROS. Anesthesia Plan      general     ASA 3 - emergent       Induction: intravenous and rapid sequence. BIS  MIPS: Postoperative opioids intended, Prophylactic antiemetics administered and Postoperative trial extubation. Anesthetic plan and risks discussed with patient and child/children (Spoke with Janki, the patient's daughter. ). Plan discussed with CRNA and attending.                   Fadi Garza RN   11/18/2020

## 2020-11-18 NOTE — H&P
Hospital Medicine History & Physical      PCP: No primary care provider on file. Date of Admission: 11/18/2020    Date of Service: Pt seen/examined on 11/18/2020 and admitted to Inpatient with expected LOS greater than two midnights due to medical therapy. Chief Complaint: Altered mental status, seizures      History Of Present Illness:     72 y.o. female with PMH of seizure disorder, dementia who was originally taken to [facility] emergency department for worsening mental status. She was witnessed to have been having seizures yesterday prompting presentation to the emergency department. Patient was reported to have had a fall episode a week ago. CT head at outside hospital revealed left-sided subacute subdural hematoma so patient was transferred to this facility for further evaluation and management by a neurosurgeon. On arrival to the emergency department here, repeat CT head showed large subdural collection on the left which is acute or acute on chronic associated with 7 mm midline shift; also had acute left sphenoid sinusitis. Past Medical History:    Seizure disorder  Dementia    Past Surgical History:      No past surgical history on file. Medications Prior to Admission:      Prior to Admission medications    Medication Sig Start Date End Date Taking? Authorizing Provider   levETIRAcetam (KEPPRA) 750 MG tablet Take 750 mg by mouth 2 times daily   Yes Historical Provider, MD   LORazepam (ATIVAN) 1 MG tablet Take 1 mg by mouth 2 times daily as needed for Anxiety. Yes Historical Provider, MD   memantine (NAMENDA) 10 MG tablet Take 10 mg by mouth 2 times daily   Yes Historical Provider, MD   donepezil (ARICEPT) 10 MG tablet Take 10 mg by mouth nightly   Yes Historical Provider, MD       Allergies:  Patient has no known allergies. Social History:      TOBACCO:   reports that she has never smoked.  She has never used smokeless tobacco.  ETOH:   has no history on file for alcohol. Family History:    Unable to obtain    REVIEW OF SYSTEMS:   Unable to complete ROS due to poor mental status. PHYSICAL EXAM:    /72   Pulse 57   Temp 97.5 °F (36.4 °C)   Resp 17   Ht 5' 4\" (1.626 m)   Wt 135 lb (61.2 kg)   SpO2 100%   BMI 23.17 kg/m²     General appearance: Lethargic, noninteractive. No apparent distress. HEENT:  Normal cephalic, atraumatic without obvious deformity. Pupils equal, round, and reactive to light. Extra ocular muscles intact. Conjunctivae/corneas clear. Neck: Supple, with full range of motion. No jugular venous distention. Trachea midline. Respiratory:  Clear to auscultation bilaterally. Cardiovascular: Normal S1/S2. Regular rhythm and rate. Abdomen: Soft, non-tender, non-distended with normal bowel sounds. Musculoskeletal:  No clubbing, cyanosis or edema bilaterally. Skin: Skin color, texture, turgor normal.  No rashes or lesions. Neurologic: Confused, not following simple command. Moving bilateral upper and lower extremities spontaneously  Psychiatric:  Alert and oriented x 0. Not agitated  Peripheral Pulses: +2 palpable, equal bilaterally       CXR:   Xr Pelvis (1-2 Views)    Result Date: 11/18/2020  EXAMINATION: ONE XRAY VIEW OF THE PELVIS 11/18/2020 2:30 am COMPARISON: None. HISTORY: ORDERING SYSTEM PROVIDED HISTORY: trauma TECHNOLOGIST PROVIDED HISTORY: Reason for exam:->trauma What reading provider will be dictating this exam?->CRC FINDINGS: No fracture is identified. Joint space alignment is normal.  There are no significant degenerative changes. Bladder catheter is in place. The soft tissues are unremarkable. No fracture or malalignment.     Ct Head Wo Contrast    Result Date: 11/18/2020  EXAMINATION: CT OF THE HEAD WITHOUT CONTRAST  11/18/2020 3:05 am TECHNIQUE: CT of the head was performed without the administration of intravenous contrast. Dose modulation, iterative reconstruction, and/or weight based adjustment of the mA/kV was utilized to reduce the radiation dose to as low as reasonably achievable. COMPARISON: 03/12/2015 HISTORY: ORDERING SYSTEM PROVIDED HISTORY: subdural hematoma TECHNOLOGIST PROVIDED HISTORY: Reason for exam:->subdural hematoma Has a \"code stroke\" or \"stroke alert\" been called? ->No What reading provider will be dictating this exam?->CRC FINDINGS: BRAIN/VENTRICLES: There is a septated extra-axial collection overlying the left frontal and parietal lobes. Maximal thickness is 2.4 cm. This is mostly low to intermediate density though there are multiple linear bands of high attenuation within the collection. There is 7 mm of midline shift. There is no hydrocephalus. No evidence for acute ischemia is identified. ORBITS: The visualized portion of the orbits demonstrate no acute abnormality. SINUSES: There is an air-fluid level in the left sphenoid sinus. There is minimal mucosal thickening in the right maxillary sinus. The mastoid air cells are clear. SOFT TISSUES/SKULL:  No acute abnormality of the visualized skull or soft tissues. 1. Large subdural collection on the left is either acute or acute on chronic. There is 7 mm of midline shift. 2. Acute left sphenoid sinusitis. Critical results were called by Dr. Marielle Paulino MD to [facility] on 11/18/2020 at 04:21. Ct Cervical Spine Wo Contrast    Result Date: 11/18/2020  EXAMINATION: CT OF THE CERVICAL SPINE WITHOUT CONTRAST 11/18/2020 3:05 am TECHNIQUE: CT of the cervical spine was performed without the administration of intravenous contrast. Multiplanar reformatted images are provided for review. Dose modulation, iterative reconstruction, and/or weight based adjustment of the mA/kV was utilized to reduce the radiation dose to as low as reasonably achievable. COMPARISON: None.  HISTORY: ORDERING SYSTEM PROVIDED HISTORY: fall TECHNOLOGIST PROVIDED HISTORY: Reason for exam:->fall What reading provider will be dictating this exam?->CRC Neck pain status 11/18/2020    Subdural hematoma ([facility] [address] [S06.5X9A] 11/18/2020     ASSESSMENT:   Seizure  Acute metabolic encephalopathy - secondary to below, possibly postictal state  Left-sided subdural hematoma with midline shift - probably traumatic  Suspected fall  Dementia      PLAN:  Admit to intermediate care unit  Neurosurgeon on board, possible surgical intervention today  Resume Keppra intravenously  Neurology consult  Resume rest of home medications  Seizure, fall precautions    DVT Prophylaxis: SCDs  Diet: No diet orders on file  Code Status: No Order    PT/OT Eval Status: pending    Dispo - TBD       Jasmeet Dunne MD 11/18/2020 9:12 AM    Thank you No primary care provider on file. for the opportunity to be involved in this patient's care. If you have any questions or concerns please feel free to contact me at [phone].

## 2020-11-18 NOTE — ANESTHESIA POSTPROCEDURE EVALUATION
Department of Anesthesiology  Postprocedure Note    Patient: Wilver Steve  MRN: [id number]  YOB: 1955  Date of evaluation: 11/18/2020  Time:  4:41 PM     Procedure Summary     Date:  11/18/20 Room / Location:  [place] [address] / [facility]    Anesthesia Start:  9406 Anesthesia Stop:  8577    Procedure:  Vallarie [place] (Left Head) Diagnosis:  (.)    Surgeon:  Michelle Perez MD Responsible Provider:  Karo Mas MD    Anesthesia Type:  general ASA Status:  3 - Emergent          Anesthesia Type: general    Juan Phase I: Juan Score: 9    Juan Phase II:      Last vitals: Reviewed and per EMR flowsheets.        Anesthesia Post Evaluation    Patient location during evaluation: PACU  Patient participation: complete - patient participated  Level of consciousness: awake and alert  Airway patency: patent  Nausea & Vomiting: no nausea and no vomiting  Complications: no  Cardiovascular status: hemodynamically stable and blood pressure returned to baseline  Respiratory status: acceptable  Hydration status: euvolemic

## 2020-11-18 NOTE — ED NOTES
Call placed to pts dtr, Janki @[phone], condition and updated on plan of care, pt to go for craniotomy with Dr. Brewster Show today, Ysabel Dumont states she has spoken with anesthesia and is now awaiting call from Dr. Erica Dean, [facility]  11/18/20 [phone]

## 2020-11-18 NOTE — PROGRESS NOTES
TRAUMA SURGERY  ATTENDING PROGRESS NOTE  Patient seen and examined, agree with resident note, for remaining HP/Consult details please see resident HP/Consult note. CC: ICH, fall, seizure     S: pt is non verbal, arouses, and is purposeful. O:   @/72   Pulse 57   Temp 97.5 °F (36.4 °C)   Resp 17   Ht 5' 4\" (1.626 m)   Wt 135 lb (61.2 kg)   SpO2 100%   BMI 23.17 kg/m² @    Gen - no apparent distress, arouses,   Neuro - opens eyes to vocie, does not follow commands but is purposeful, non verbal, no lateralizing signs      HEENT - PERRL 3mm conj pink   Lungs - non labored, BS clear b/l    Heart - RR no extra heart sounds    Abdomen - Soft   Spine -   C collar in place no tenderness, in spine:    Ext- all ext with some flexor contraction, NVI no obvious deformities, or     CT head: L SDH     A/P: Falls, seizure, L SDH       Active Problems:    Seizure (HCC)    Subdural hematoma (HCC)  Resolved Problems:    * No resolved hospital problems. *      - SDH, likely acute on chronic, NS seeing her,  [place] hole crani?  - Seizure< known hx, cont medication, keppra, siezure precautions. ? If from SDH vs, preexisting. ..  - Not much trauma burden here given her significant underlying medical issues. - Appreciate medical admission.      DVT prophylaxis: [place],    Vincent Harrington MD FACS

## 2020-11-18 NOTE — ED NOTES
Pt opens eyes minimally does not follow simple verbal commands or answer question moves lower ext at random skin warm dry resp easy c collar intact no seizure activity noted at this time     Micki Erickson RN  11/18/20 8945

## 2020-11-19 ENCOUNTER — APPOINTMENT (OUTPATIENT)
Dept: CT IMAGING | Age: 65
DRG: 025 | End: 2020-11-19
Payer: MEDICARE

## 2020-11-19 LAB
MAGNESIUM: 1.3 MG/DL (ref 1.6–2.6)
PHOSPHORUS: 2.7 MG/DL (ref 2.5–4.5)

## 2020-11-19 PROCEDURE — 6360000002 HC RX W HCPCS: Performed by: SURGERY

## 2020-11-19 PROCEDURE — 2700000000 HC OXYGEN THERAPY PER DAY

## 2020-11-19 PROCEDURE — 99233 SBSQ HOSP IP/OBS HIGH 50: CPT | Performed by: SURGERY

## 2020-11-19 PROCEDURE — 70450 CT HEAD/BRAIN W/O DYE: CPT

## 2020-11-19 PROCEDURE — 6360000002 HC RX W HCPCS: Performed by: PHYSICIAN ASSISTANT

## 2020-11-19 PROCEDURE — 83735 ASSAY OF MAGNESIUM: CPT

## 2020-11-19 PROCEDURE — 36415 COLL VENOUS BLD VENIPUNCTURE: CPT

## 2020-11-19 PROCEDURE — 84100 ASSAY OF PHOSPHORUS: CPT

## 2020-11-19 PROCEDURE — 2580000003 HC RX 258: Performed by: STUDENT IN AN ORGANIZED HEALTH CARE EDUCATION/TRAINING PROGRAM

## 2020-11-19 PROCEDURE — 2000000000 HC ICU R&B

## 2020-11-19 PROCEDURE — 99221 1ST HOSP IP/OBS SF/LOW 40: CPT | Performed by: PSYCHIATRY & NEUROLOGY

## 2020-11-19 PROCEDURE — 2580000003 HC RX 258: Performed by: PHYSICIAN ASSISTANT

## 2020-11-19 PROCEDURE — 6360000002 HC RX W HCPCS

## 2020-11-19 RX ORDER — MECOBALAMIN 5000 MCG
5 TABLET,DISINTEGRATING ORAL NIGHTLY PRN
Status: DISCONTINUED | OUTPATIENT
Start: 2020-11-19 | End: 2020-11-30 | Stop reason: HOSPADM

## 2020-11-19 RX ORDER — MORPHINE SULFATE 2 MG/ML
2 INJECTION, SOLUTION INTRAMUSCULAR; INTRAVENOUS ONCE
Status: COMPLETED | OUTPATIENT
Start: 2020-11-19 | End: 2020-11-19

## 2020-11-19 RX ORDER — LIDOCAINE HYDROCHLORIDE 10 MG/ML
INJECTION, SOLUTION EPIDURAL; INFILTRATION; INTRACAUDAL; PERINEURAL
Status: DISCONTINUED
Start: 2020-11-19 | End: 2020-11-19 | Stop reason: WASHOUT

## 2020-11-19 RX ORDER — MORPHINE SULFATE 2 MG/ML
INJECTION, SOLUTION INTRAMUSCULAR; INTRAVENOUS
Status: COMPLETED
Start: 2020-11-19 | End: 2020-11-19

## 2020-11-19 RX ORDER — LORAZEPAM 2 MG/ML
0.5 INJECTION INTRAMUSCULAR EVERY 8 HOURS PRN
Status: DISCONTINUED | OUTPATIENT
Start: 2020-11-19 | End: 2020-11-30 | Stop reason: HOSPADM

## 2020-11-19 RX ADMIN — LEVETIRACETAM 750 MG: 15 INJECTION INTRAVENOUS at 02:11

## 2020-11-19 RX ADMIN — SODIUM CHLORIDE, PRESERVATIVE FREE 10 ML: 5 INJECTION INTRAVENOUS at 11:42

## 2020-11-19 RX ADMIN — Medication 2 G: at 05:45

## 2020-11-19 RX ADMIN — LEVETIRACETAM 750 MG: 15 INJECTION INTRAVENOUS at 13:13

## 2020-11-19 RX ADMIN — LORAZEPAM 0.5 MG: 2 INJECTION INTRAMUSCULAR; INTRAVENOUS at 17:49

## 2020-11-19 RX ADMIN — MORPHINE SULFATE 2 MG: 2 INJECTION, SOLUTION INTRAMUSCULAR; INTRAVENOUS at 11:42

## 2020-11-19 RX ADMIN — Medication 10 ML: at 08:26

## 2020-11-19 RX ADMIN — SODIUM CHLORIDE: 9 INJECTION, SOLUTION INTRAVENOUS at 10:00

## 2020-11-19 ASSESSMENT — PAIN SCALES - GENERAL
PAINLEVEL_OUTOF10: 0

## 2020-11-19 NOTE — PLAN OF CARE
Problem: Restraint Use - Nonviolent/Non-Self-Destructive Behavior:  Goal: Absence of restraint indications  Description: Absence of restraint indications  11/19/2020 1603 by Little Michel RN  Outcome: Not Met This Shift     Problem: Restraint Use - Nonviolent/Non-Self-Destructive Behavior:  Goal: Absence of restraint-related injury  Description: Absence of restraint-related injury  11/19/2020 1603 by Little Michel RN  Outcome: Met This Shift

## 2020-11-19 NOTE — CONSULTS
Gayatri Amaya is a 72 y.o. female       Chief Complaint   Patient presents with    Head Injury     Patient arrived from [facility] with subdural ICH with midlione shift. and Seizures       HPI:  72year old woman presented as transfer from [address] [place] [address] with large L SDH and witnessed seizure. She is status post brandie hole drainage. No further seizures reported this admission. She is rather aphasic and not able to communicate with me. Per nursing she will indicate yes no occasionally. Moves all extremities. Baseline dementia   HPI  Prior to Visit Medications    Medication Sig Taking? Authorizing Provider   levETIRAcetam (KEPPRA) 750 MG tablet Take 750 mg by mouth 2 times daily Yes Historical Provider, MD   LORazepam (ATIVAN) 1 MG tablet Take 1 mg by mouth 2 times daily as needed for Anxiety. Yes Historical Provider, MD   memantine (NAMENDA) 10 MG tablet Take 10 mg by mouth 2 times daily Yes Historical Provider, MD   donepezil (ARICEPT) 10 MG tablet Take 10 mg by mouth nightly Yes Historical Provider, MD     Social History     Tobacco Use    Smoking status: Never Smoker    Smokeless tobacco: Never Used   Substance Use Topics    Alcohol use: Not on file    Drug use: Not on file     No family history on file. Past Surgical History:   Procedure Laterality Date    CRANIOTOMY Left 11/18/2020    CRANIOTOMY BRANDIE HOLES performed by Demario Amaya MD at [address] [place] [address]     No past medical history on file. Review of Systems   Reason unable to perform ROS: aphasia. Objective:   BP (!) 140/74   Pulse 52   Temp 97.1 °F (36.2 °C) (Temporal)   Resp 17   Ht 5' 4\" (1.626 m)   Wt 137 lb (62.1 kg)   SpO2 100%   BMI 23.52 kg/m²     Physical Exam  Constitutional:       General: She is not in acute distress. Eyes:      Extraocular Movements: Extraocular movements intact. Conjunctiva/sclera: Conjunctivae normal.   Cardiovascular:      Rate and Rhythm: Normal rate and regular rhythm.    Pulmonary: Breath sounds: Normal breath sounds. Neurological:      Mental Status: She is alert. Psychiatric:         Behavior: Behavior normal.                 Neurological Exam  Mental Status  Alert. No verbal output for me. Tracks examiner around room grasps hand bilaterally. .    Cranial Nerves  CN II: Blink to threat. CN III, IV, VI: Extraocular movements intact bilaterally. CN VII: Full and symmetric facial movement. Face symmetric. Conjugate gaze. Pupils reactive  . Motor  Decreased muscle bulk throughout. Increased muscle tone. No abnormal involuntary movements. Moves all extremities spontaneously. Symmetric  strength. .    Sensory  Reacts to stim bilaterally. .      Laboratory/Radiology:     CBC with Differential:    Lab Results   Component Value Date    WBC 6.5 11/18/2020    RBC 4.11 11/18/2020    HGB 12.8 11/18/2020    HCT 37.9 11/18/2020     11/18/2020    MCV 92.2 11/18/2020    MCH 31.1 11/18/2020    MCHC 33.8 11/18/2020    RDW 13.0 11/18/2020    SEGSPCT 48 10/30/2013    LYMPHOPCT 3.5 11/18/2020    MONOPCT 2.6 11/18/2020    BASOPCT 0.3 11/18/2020    MONOSABS 0.20 11/18/2020    LYMPHSABS 0.26 11/18/2020    EOSABS 0.00 11/18/2020    BASOSABS 0.00 11/18/2020     CMP:    Lab Results   Component Value Date     11/18/2020    K 4.2 11/18/2020     11/18/2020    CO2 25 11/18/2020    BUN 16 11/18/2020    CREATININE 0.6 11/18/2020    GFRAA >60 11/18/2020    LABGLOM >60 11/18/2020    GLUCOSE 101 11/18/2020    GLUCOSE 105 05/04/2012    PROT 6.3 11/18/2020    LABALBU 3.5 11/18/2020    LABALBU 4.4 05/04/2012    CALCIUM 10.0 11/18/2020    BILITOT 0.5 11/18/2020    ALKPHOS 64 11/18/2020    AST 29 11/18/2020    ALT 25 11/18/2020     PT/INR:    Lab Results   Component Value Date    PROTIME 10.8 11/18/2020    INR 1.0 11/18/2020       CT head:  preop CT:        I independently reviewed the labs and imaging studies at today's appointment.      Assessment:     Acute symptomatic seizures secondary to large likely acute and chronic SDH. Plan:     Monitor for further seizures. Follow up repeat CT head. Obtain routine EEG to rule out subclinical seizures contributing to ongoing aphasia. Continue enriqueta Madden  11:45 AM  11/19/2020

## 2020-11-19 NOTE — CARE COORDINATION
Spoke with Pt's Sister Areli Davison [phone] about Transition Plan of Care. Pt lives with Nova Mckeon with no steps to enter home but steps to reach 2nd floor where bedroom is. Pt was able to use steps prior to admission. Margot Burr has taking Dementia precautions for the home. Pt will not sit on the toilet. Pt will turn stiff of a board will directed to sit. Pt is not able to bath/dress herself. Sister provides incontinent care multiple times a day. Pt did feed herself prior to admission. Pt has Aides 2 days at 5 hours a day and 1- 8 hour a day. Discharge plan is to return home with Nova Mckeon when medically stable for discharge. Margot Burr will provide transport. CALLUM/SANDRA to follow for discharge needs.    Imtiaz Hagan, [email or web address].  [phone]

## 2020-11-19 NOTE — PROGRESS NOTES
735 [Urine:535; Blood:200]  I/O this shift:  In: -   Out: 250 [Urine:250]    Radiology:  CT Head WO Contrast   Final Result   1. Large subdural collection on the left is either acute or acute on chronic. There is 7 mm of midline shift. 2. Acute left sphenoid sinusitis. Critical results were called by Dr. Stephan Holland MD to [facility] [place]   on 11/18/2020 at 04:21. CT Cervical Spine WO Contrast   Final Result   No acute abnormality of the cervical spine. XR PELVIS (1-2 VIEWS)   Final Result   No fracture or malalignment. XR CHEST PORTABLE   Final Result   1. No acute traumatic abnormality identified. 2. Right perihilar nodule. Nonemergent routine chest CT scan is recommended   for further evaluation. CT HEAD WO CONTRAST    (Results Pending)       PHYSICAL EXAM:   GCS:  4 - Opens eyes on own   5 - localized to pain  3 - inappropriate words    GCS 12    Pupil size: Left 4 mm     Right 4 mm  Pupil reaction: Yes  Wiggles fingers: Left no    Right no  Wiggles toes: Left no    Right no  Plantar flexion: Left not performed   Right not performed    GENERAL:  NAD. GCS 12. HEAD:  S/p bethany hole with dressing c/d/i. LUNGS:  No increased work of breathing. CTAB. CARDIOVASCULAR: RR  ABDOMEN:  Soft, non-distended, non-tender. No guarding, rigidity, rebound. EXTREMITIES:  MAEx4. No LE edema. Atraumatic.   SKIN:  Warm and dry      Spine:       Spine Tenderness ROM   Cervical 0 /10 Normal   Thoracic 0 /10 Normal   Lumbar 0 /10 Normal     Musculoskeletal:    Joint Tenderness Swelling/Deformity ROM   Right shoulder absent absent normal   Left shoulder absent absent normal   Right elbow absent absent normal   Left elbow absent absent normal   Right wrist absent absent normal   Left wrist absent absent normal   Right hand grasp absent absent normal   Left hand grasp absent absent normal   Right hip absent absent normal   Left hip absent absent normal   Right knee absent absent normal   Left knee absent absent normal   Right ankle absent absent normal   Left ankle absent absent normal   Right foot absent absent normal   Left foot absent absent normal         CONSULTS:  Neurosurgery      Active Problems:    Seizure ([address]    Subdural hematoma ([address]    Head injury    Fall  Resolved Problems:    * No resolved hospital problems. *        Assessment/Plan:     Neuro:  Subacute SDH s/p bethany hole with NSGY. GCS 12. Pain control martin tylenol. Ativan prn for agitation. Keppra 750 BID. Donepezil and memantine for baseline dementia. CV: No acute issues. Pulm: No acute issues. Encourage IS/SMI. GI: No acute issues. Bowel regimen. Zofran PRN. Check swallow eval.   Renal: No acute issues. Endocrine: No acute issues. MSK: No acute issues. PT/OT. Heme: No acute issues. ID: No acute issues.     Pain/Analgesia: tylenol  Bowel Regimen: glycolax  DVT PPx:  SCDs,   GI PPx:  none     Code status:  Prior    Disposition:  DIONNAIC    Chris Avila DO  Resident, PGY-1  11/19/2020  5:24 AM

## 2020-11-19 NOTE — PROGRESS NOTES
Hospitalist Progress Note      PCP: No primary care provider on file. Date of Admission: 11/18/2020    Chief Complaint: none    Hospital Course:    72 y.o. female with PMH of seizure disorder, dementia who was originally taken to [facility] emergency department for worsening mental status. Patient was reported to have had a fall episode a week prior to presentation and had been having witnessed seizures. CT head at outside hospital revealed left-sided subacute subdural hematoma so patient was transferred to this facility for further evaluation and management by a neurosurgeon.     On arrival to the emergency department here, repeat CT head showed large subdural collection on the left which is acute or acute on chronic associated with 7 mm midline shift; also had acute left sphenoid sinusitis. She was evaluated by neurosurgeon, underwent frontotemporal bethany holes craniotomy with left subdural drain placement. Subjective: Pt was seen and examined in ICU. No acute event overnight; unable to complete ROS due to poor mental status. Medications:  Reviewed    Infusion Medications    sodium chloride 75 mL/hr at 11/18/20 1703     Scheduled Medications    sodium chloride flush  10 mL Intravenous 2 times per day    acetaminophen  650 mg Oral Q4H    donepezil  10 mg Oral Nightly    memantine  10 mg Oral BID    atorvastatin  80 mg Oral Nightly    levetiracetam  750 mg Intravenous Q12H     PRN Meds: polyethylene glycol, LORazepam, acetaminophen, sodium chloride flush, promethazine **OR** ondansetron      Intake/Output Summary (Last 24 hours) at 11/19/2020 0930  Last data filed at 11/19/2020 0900  Gross per 24 hour   Intake 635 ml   Output 1180 ml   Net -545 ml       Exam:    BP (!) 149/79   Pulse (!) 47   Temp 97 °F (36.1 °C) (Temporal)   Resp 12   Ht 5' 4\" (1.626 m)   Wt 137 lb (62.1 kg)   SpO2 100%   BMI 23.52 kg/m²     General appearance: More awake, interactive.  No apparent distress. HEENT: Lt frontotemporal craniotomy with staples in place  Respiratory: Clear to auscultation bilaterally. Cardiovascular: Normal S1/S2. Regular rhythm and rate. Abdomen: Soft, non-tender, non-distended with normal bowel sounds. Musculoskeletal: No clubbing, cyanosis or edema bilaterally. Skin: Skin color, texture, turgor normal.  No rashes or lesions. Neurologic: Speaking nonsense, not following simple command. Moving all extremities  Psychiatric: Alert and oriented x 0. Not agitated   Peripheral Pulses: +2 palpable, equal bilaterally       Labs:   Recent Labs     11/18/20  0220   WBC 6.5   HGB 12.8   HCT 37.9   *     Recent Labs     11/18/20  0350 11/19/20  0553     --    K 4.2  --      --    CO2 25  --    BUN 16  --    CREATININE 0.6  --    CALCIUM 10.0  --    PHOS  --  2.7     Recent Labs     11/18/20  0350   AST 29   ALT 25   BILITOT 0.5   ALKPHOS 64     Recent Labs     11/18/20  0350   INR 1.0     Recent Labs     11/18/20  0350   TROPONINI <0.01       Radiology:  CT Head WO Contrast   Final Result   1. Large subdural collection on the left is either acute or acute on chronic. There is 7 mm of midline shift. 2. Acute left sphenoid sinusitis. Critical results were called by Dr. Mena Mccauley MD to [facility]   on 11/18/2020 at 04:21. CT Cervical Spine WO Contrast   Final Result   No acute abnormality of the cervical spine. XR PELVIS (1-2 VIEWS)   Final Result   No fracture or malalignment. XR CHEST PORTABLE   Final Result   1. No acute traumatic abnormality identified. 2. Right perihilar nodule. Nonemergent routine chest CT scan is recommended   for further evaluation. CT HEAD WO CONTRAST    (Results Pending)             Active Hospital Problems    Diagnosis Date Noted    Seizure [facility]) [R56.9] 11/18/2020    Subdural hematoma ([address] [E73.2I8M] 11/18/2020    Head injury [S09.90XA]     Fall [W19. XXXA]        Assessment  Left-sided subdural hematoma with midline shift - underwent left frontotemporal bethany holes craniotomy with left subdural drain placement on 11/18.   Seizure - likely secondary to above, no repeat episode overnight  Acute metabolic encephalopathy   Suspected fall  Dementia      Plan:  Continue with drain management per neurosurgeon  On IV Keppra 750 mg twice daily  Neurology consult pending  Gentle IV hydration  PRN analgesia      DVT Prophylaxis: SCDs  Diet: Diet NPO Effective Now Exceptions are: Sips with Meds  Code Status: Prior    PT/OT Eval Status: pending    Dispo - TBD    Leonides Obrien MD 11/19/2020 9:30 AM

## 2020-11-19 NOTE — PROGRESS NOTES
[facility]  SURGICAL INTENSIVE CARE UNIT (SICU)  ATTENDING PHYSICIAN CRITICAL CARE PROGRESS NOTE     I have examined the patient, reviewed the record, and discussed the case with the APN/ resident. Please refer to the APN/ resident's note. I agree with the assessment and plan. I have reviewed all relevant labs and imaging data. The following summarizes my clinical findings and independent assessment.     CC:  Critical care management for [place] Hospital Course/Overnight Events:  11/18--admitted after seizures and found to have SDH; underwent bethany hole crani  11/19--drain removed today    Non-verbal  Not following commands  Eyes to voice  Hrt:  Regular  Lungs:  Fairly clear bilaterally  Abd:  Soft; BS active; NT/ND  Skin:  Warm/dry    Patient Active Problem List    Diagnosis Date Noted    Seizure [facility]) 11/18/2020    Subdural hematoma ([address] 11/18/2020    Head injury     Fall        SDH--s/p bethany hole crani--monitor neuro exam  Seizure--on keppra  Discuss with family TF vs allowing pt to eat with understanding of risk for aspiration  Monitor hemodynamics  Pain control  PT/OT evals  DVT risk--PCDs      Scott Sellers MD, FACS  11/19/2020  3:01 PM

## 2020-11-19 NOTE — OP NOTE
[address]                  [address] [facility], [address] [place] [address]                                OPERATIVE REPORT    PATIENT NAME: Fermin Schaffer                 :        1955  MED REC NO:   [id number]                            ROOM:       3597  ACCOUNT NO:   [de-identified]                           ADMIT DATE: 2020  PROVIDER:     Mandy Thompson MD    DATE OF PROCEDURE:  2020    PREOPERATIVE DIAGNOSIS:  Left subacute frontotemporal subdural hematoma. POSTOPERATIVE DIAGNOSIS:  Left subacute frontotemporal subdural  hematoma. OPERATIVE PROCEDURES:  1. Left frontal bur hole drainage of left frontotemporal subdural  hematoma. 2.  Placement of left subdural drain. ANESTHESIA:  Generalized endotracheal anesthesia. SURGEON:  Mandy Thompson MD    ASSISTANT:  Robyn Cherry DO    COMPLICATIONS:  None. ESTIMATED BLOOD LOSS:  200 mL. SPECIMEN:  None. OPERATIVE INDICATIONS:  The patient is a 66-year-old lady who presented  to the emergency room after a recent fall. She was felt to be more  altered. She subsequently had CT scan that showed that she had  significant left frontotemporal subdural hematoma and after risks,  benefits and alternatives were discussed with her daughter, it was  determined that she would undergo the above-listed procedure. DESCRIPTION OF OPERATIVE PROCEDURE:  The patient was brought into the  operating room. A time out was performed where she was identified by  her name, medical record number and the operative procedure which she  was about to undergo. Next, induction of generalized endotracheal  anesthesia was then commenced. Upon completion of induction of  generalized endotracheal anesthesia, she received preoperative  antibiotics. She was then positioned on the operating table with her  head in a donut.   Next, her hair was clipped and after this was done,  two incisions were then marked out in the left frontal region. The  incisions were then prepped and draped in usual sterile fashion. I then  used a #10 blade to open up both incisions. Monopolar cautery was used  to dissect through the five layers of the scalp down to the pericranium  at both sites. I then placed self-retaining Weitlaner retractor into  both incisions and used a high-speed bur to perforate bit to drill two  bur holes. After the bur holes were drilled, I then used a small  straight curette to remove the thin layer of paracranium that was left  in place. I then proceeded to then open up the dura in a cruciate  fashion at both sides. There was egress of dark motor oil appearing  fluid under pressure. Once the spontaneous egress had ceased, I then  inserted a red rubber catheter in the subdural space. I irrigated it  with a liter of saline until the irrigation was clear. I then proceeded  to place a subdural drain that was anchored to the skin using 3 0 nylon  suture. I then proceeded to close both bur holes with [employer]  cranial fixation system and subsequently proceeded to close both  incisions in layers using 2 0 Vicryl for the galea and staples for the  skin. A dry sterile dressing was placed over both incisions. The  patient was then subsequently extubated and transported to the  postanesthesia care unit in stable condition. There were no  complications. Counts were correct. I was present for the entire case.           Paulina Carter MD    D: 11/18/2020 15:51:33       T: 11/18/2020 15:58:09     [email or web address]  Job#: [id number]     Doc#: [id number]    CC:

## 2020-11-19 NOTE — PLAN OF CARE
Problem: Skin Integrity:  Goal: Will show no infection signs and symptoms  Description: Will show no infection signs and symptoms  11/19/2020 0821 by Katherin Vera RN  Outcome: Met This Shift     Problem: Skin Integrity:  Goal: Absence of new skin breakdown  Description: Absence of new skin breakdown  11/19/2020 0821 by Katherin Vera RN  Outcome: Met This Shift     Problem: Falls - Risk of:  Goal: Will remain free from falls  Description: Will remain free from falls  11/19/2020 0821 by Katherin Vera RN  Outcome: Met This Shift     Problem: Falls - Risk of:  Goal: Absence of physical injury  Description: Absence of physical injury  11/19/2020 0821 by Katherin Vera RN  Outcome: Met This Shift     Problem: Restraint Use - Nonviolent/Non-Self-Destructive Behavior:  Goal: Absence of restraint indications  Description: Absence of restraint indications  11/19/2020 0821 by Katherin Vera RN  Outcome: Not Met This Shift     Problem: Restraint Use - Nonviolent/Non-Self-Destructive Behavior:  Goal: Absence of restraint-related injury  Description: Absence of restraint-related injury  11/19/2020 0821 by Katherin Vera RN  Outcome: Met This Shift

## 2020-11-19 NOTE — PROGRESS NOTES
Department of Neurosurgery  Progress Note    CHIEF COMPLAINT: s/p bethany hole craniotomy 11/18    SUBJECTIVE:  Awake and alert. No seizure activity. Subdural drain removed. REVIEW OF SYSTEMS :  Unable to obtain.      OBJECTIVE:   VITALS:  BP (!) 140/74   Pulse 52   Temp 97.1 °F (36.2 °C) (Temporal)   Resp 17   Ht 5' 4\" (1.626 m)   Wt 137 lb (62.1 kg)   SpO2 100%   BMI 23.52 kg/m²     PHYSICAL:  Neurologic:  Awake and alert  Motor Exam:  Moving all extremities well  Sensory:  Sensory intact  Incision c/d/i      DATA:  CBC:   Lab Results   Component Value Date    WBC 6.5 11/18/2020    RBC 4.11 11/18/2020    HGB 12.8 11/18/2020    HCT 37.9 11/18/2020    MCV 92.2 11/18/2020    MCH 31.1 11/18/2020    MCHC 33.8 11/18/2020    RDW 13.0 11/18/2020     11/18/2020    MPV 11.8 11/18/2020     BMP:    Lab Results   Component Value Date     11/18/2020    K 4.2 11/18/2020     11/18/2020    CO2 25 11/18/2020    BUN 16 11/18/2020    LABALBU 3.5 11/18/2020    LABALBU 4.4 05/04/2012    CREATININE 0.6 11/18/2020    CALCIUM 10.0 11/18/2020    GFRAA >60 11/18/2020    LABGLOM >60 11/18/2020    GLUCOSE 101 11/18/2020    GLUCOSE 105 05/04/2012     PT/INR:    Lab Results   Component Value Date    PROTIME 10.8 11/18/2020    INR 1.0 11/18/2020     PTT:    Lab Results   Component Value Date    APTT 34.8 11/18/2020   [APTT}    Current Inpatient Medications  Current Facility-Administered Medications: sodium chloride flush 0.9 % injection 10 mL, 10 mL, Intravenous, 2 times per day  acetaminophen (TYLENOL) tablet 650 mg, 650 mg, Oral, Q4H  polyethylene glycol (GLYCOLAX) packet 17 g, 17 g, Oral, Daily PRN  donepezil (ARICEPT) tablet 10 mg, 10 mg, Oral, Nightly  LORazepam (ATIVAN) tablet 1 mg, 1 mg, Oral, BID PRN  memantine (NAMENDA) tablet 10 mg, 10 mg, Oral, BID  acetaminophen (TYLENOL) tablet 650 mg, 650 mg, Oral, Q4H PRN  atorvastatin (LIPITOR) tablet 80 mg, 80 mg, Oral, Nightly  levetiracetam (KEPPRA) 750 mg/50 mL IVPB, 750 mg, Intravenous, Q12H  sodium chloride flush 0.9 % injection 10 mL, 10 mL, Intravenous, PRN  promethazine (PHENERGAN) tablet 12.5 mg, 12.5 mg, Oral, Q6H PRN **OR** ondansetron (ZOFRAN) injection 4 mg, 4 mg, Intravenous, Q6H PRN  0.9 % sodium chloride infusion, , Intravenous, Continuous    ASSESSMENT:   s/p bethany hole craniotomy 11/18    PLAN:  -Continue ICU care  -Serial neurological exams  -Pain control  -No anticoagulation/antiplatelet agents  -Neurology following      Electronically signed by Vignesh Toro PA-C on 11/19/2020 at 12:30 PM

## 2020-11-19 NOTE — FLOWSHEET NOTE
Patient agitated and restless with hands on care. Patient reaching for lines/tubes critical to care. Attempts made to redirect patient, without success. Bilateral soft wrist restraints remain for patients safety.

## 2020-11-20 ENCOUNTER — APPOINTMENT (OUTPATIENT)
Dept: GENERAL RADIOLOGY | Age: 65
DRG: 025 | End: 2020-11-20
Payer: MEDICARE

## 2020-11-20 ENCOUNTER — APPOINTMENT (OUTPATIENT)
Dept: NEUROLOGY | Age: 65
DRG: 025 | End: 2020-11-20
Payer: MEDICARE

## 2020-11-20 LAB
ALBUMIN SERPL-MCNC: 3 G/DL (ref 3.5–5.2)
ALP BLD-CCNC: 54 U/L (ref 35–104)
ALT SERPL-CCNC: 14 U/L (ref 0–32)
ANION GAP SERPL CALCULATED.3IONS-SCNC: 8 MMOL/L (ref 7–16)
AST SERPL-CCNC: 25 U/L (ref 0–31)
BASOPHILS ABSOLUTE: 0.02 E9/L (ref 0–0.2)
BASOPHILS RELATIVE PERCENT: 0.4 % (ref 0–2)
BILIRUB SERPL-MCNC: 0.4 MG/DL (ref 0–1.2)
BUN BLDV-MCNC: 18 MG/DL (ref 8–23)
CALCIUM SERPL-MCNC: 9.5 MG/DL (ref 8.6–10.2)
CHLORIDE BLD-SCNC: 109 MMOL/L (ref 98–107)
CO2: 24 MMOL/L (ref 22–29)
CREAT SERPL-MCNC: 0.6 MG/DL (ref 0.5–1)
EOSINOPHILS ABSOLUTE: 0.04 E9/L (ref 0.05–0.5)
EOSINOPHILS RELATIVE PERCENT: 0.7 % (ref 0–6)
GFR AFRICAN AMERICAN: >60
GFR NON-AFRICAN AMERICAN: >60 ML/MIN/1.73
GLUCOSE BLD-MCNC: 62 MG/DL (ref 74–99)
HCT VFR BLD CALC: 31.2 % (ref 34–48)
HEMOGLOBIN: 10.1 G/DL (ref 11.5–15.5)
IMMATURE GRANULOCYTES #: 0.02 E9/L
IMMATURE GRANULOCYTES %: 0.4 % (ref 0–5)
LYMPHOCYTES ABSOLUTE: 1.23 E9/L (ref 1.5–4)
LYMPHOCYTES RELATIVE PERCENT: 21.5 % (ref 20–42)
MAGNESIUM: 1.5 MG/DL (ref 1.6–2.6)
MCH RBC QN AUTO: 30.9 PG (ref 26–35)
MCHC RBC AUTO-ENTMCNC: 32.4 % (ref 32–34.5)
MCV RBC AUTO: 95.4 FL (ref 80–99.9)
MONOCYTES ABSOLUTE: 0.38 E9/L (ref 0.1–0.95)
MONOCYTES RELATIVE PERCENT: 6.7 % (ref 2–12)
NEUTROPHILS ABSOLUTE: 4.02 E9/L (ref 1.8–7.3)
NEUTROPHILS RELATIVE PERCENT: 70.3 % (ref 43–80)
PDW BLD-RTO: 12.9 FL (ref 11.5–15)
PHOSPHORUS: 2.4 MG/DL (ref 2.5–4.5)
PLATELET # BLD: 95 E9/L (ref 130–450)
PLATELET CONFIRMATION: NORMAL
PMV BLD AUTO: 11.6 FL (ref 7–12)
POTASSIUM REFLEX MAGNESIUM: 3.5 MMOL/L (ref 3.5–5)
RBC # BLD: 3.27 E12/L (ref 3.5–5.5)
SODIUM BLD-SCNC: 141 MMOL/L (ref 132–146)
TOTAL PROTEIN: 5.6 G/DL (ref 6.4–8.3)
WBC # BLD: 5.7 E9/L (ref 4.5–11.5)

## 2020-11-20 PROCEDURE — 6370000000 HC RX 637 (ALT 250 FOR IP): Performed by: STUDENT IN AN ORGANIZED HEALTH CARE EDUCATION/TRAINING PROGRAM

## 2020-11-20 PROCEDURE — 2709999900 HC NON-CHARGEABLE SUPPLY

## 2020-11-20 PROCEDURE — 74018 RADEX ABDOMEN 1 VIEW: CPT

## 2020-11-20 PROCEDURE — 95819 EEG AWAKE AND ASLEEP: CPT

## 2020-11-20 PROCEDURE — 2000000000 HC ICU R&B

## 2020-11-20 PROCEDURE — 99223 1ST HOSP IP/OBS HIGH 75: CPT | Performed by: NURSE PRACTITIONER

## 2020-11-20 PROCEDURE — 84100 ASSAY OF PHOSPHORUS: CPT

## 2020-11-20 PROCEDURE — 6370000000 HC RX 637 (ALT 250 FOR IP): Performed by: SURGERY

## 2020-11-20 PROCEDURE — 95816 EEG AWAKE AND DROWSY: CPT | Performed by: PSYCHIATRY & NEUROLOGY

## 2020-11-20 PROCEDURE — 85025 COMPLETE CBC W/AUTO DIFF WBC: CPT

## 2020-11-20 PROCEDURE — 6360000002 HC RX W HCPCS: Performed by: SURGERY

## 2020-11-20 PROCEDURE — 6360000002 HC RX W HCPCS: Performed by: PHYSICIAN ASSISTANT

## 2020-11-20 PROCEDURE — 6360000002 HC RX W HCPCS: Performed by: NURSE PRACTITIONER

## 2020-11-20 PROCEDURE — 6360000002 HC RX W HCPCS

## 2020-11-20 PROCEDURE — 83735 ASSAY OF MAGNESIUM: CPT

## 2020-11-20 PROCEDURE — 36415 COLL VENOUS BLD VENIPUNCTURE: CPT

## 2020-11-20 PROCEDURE — 80053 COMPREHEN METABOLIC PANEL: CPT

## 2020-11-20 PROCEDURE — 6370000000 HC RX 637 (ALT 250 FOR IP): Performed by: HOSPITALIST

## 2020-11-20 PROCEDURE — 99233 SBSQ HOSP IP/OBS HIGH 50: CPT | Performed by: SURGERY

## 2020-11-20 RX ORDER — POTASSIUM CHLORIDE 7.45 MG/ML
10 INJECTION INTRAVENOUS
Status: COMPLETED | OUTPATIENT
Start: 2020-11-20 | End: 2020-11-20

## 2020-11-20 RX ORDER — MAGNESIUM SULFATE IN WATER 40 MG/ML
4 INJECTION, SOLUTION INTRAVENOUS ONCE
Status: COMPLETED | OUTPATIENT
Start: 2020-11-20 | End: 2020-11-20

## 2020-11-20 RX ORDER — POTASSIUM CHLORIDE 7.45 MG/ML
INJECTION INTRAVENOUS
Status: COMPLETED
Start: 2020-11-20 | End: 2020-11-20

## 2020-11-20 RX ADMIN — DONEPEZIL HYDROCHLORIDE 10 MG: 5 TABLET, FILM COATED ORAL at 19:58

## 2020-11-20 RX ADMIN — ACETAMINOPHEN 650 MG: 325 TABLET ORAL at 22:42

## 2020-11-20 RX ADMIN — LEVETIRACETAM 750 MG: 15 INJECTION INTRAVENOUS at 02:12

## 2020-11-20 RX ADMIN — LEVETIRACETAM 750 MG: 15 INJECTION INTRAVENOUS at 16:06

## 2020-11-20 RX ADMIN — ACETAMINOPHEN 650 MG: 325 TABLET ORAL at 17:05

## 2020-11-20 RX ADMIN — LORAZEPAM 0.5 MG: 2 INJECTION INTRAMUSCULAR; INTRAVENOUS at 16:04

## 2020-11-20 RX ADMIN — POTASSIUM CHLORIDE 10 MEQ: 10 INJECTION, SOLUTION INTRAVENOUS at 08:50

## 2020-11-20 RX ADMIN — Medication 5 MG: at 19:58

## 2020-11-20 RX ADMIN — LORAZEPAM 0.5 MG: 2 INJECTION INTRAMUSCULAR; INTRAVENOUS at 05:24

## 2020-11-20 RX ADMIN — ATORVASTATIN CALCIUM 80 MG: 80 TABLET, FILM COATED ORAL at 19:58

## 2020-11-20 RX ADMIN — POTASSIUM CHLORIDE 10 MEQ: 10 INJECTION, SOLUTION INTRAVENOUS at 10:29

## 2020-11-20 RX ADMIN — POTASSIUM CHLORIDE 10 MEQ: 10 INJECTION, SOLUTION INTRAVENOUS at 17:06

## 2020-11-20 RX ADMIN — POTASSIUM CHLORIDE 10 MEQ: 10 INJECTION, SOLUTION INTRAVENOUS at 15:00

## 2020-11-20 RX ADMIN — MEMANTINE HYDROCHLORIDE 10 MG: 10 TABLET, FILM COATED ORAL at 19:59

## 2020-11-20 RX ADMIN — POTASSIUM CHLORIDE 10 MEQ: 10 INJECTION, SOLUTION INTRAVENOUS at 15:01

## 2020-11-20 RX ADMIN — MAGNESIUM SULFATE HEPTAHYDRATE 4 G: 40 INJECTION, SOLUTION INTRAVENOUS at 08:18

## 2020-11-20 ASSESSMENT — PAIN SCALES - GENERAL: PAINLEVEL_OUTOF10: 10

## 2020-11-20 NOTE — PROGRESS NOTES
OCCUPATIONAL THERAPY    Date:2020  Patient Name: Jayro Brown  MRN: [id number]  : 1955  Room: [address] [place] [address]A              Chart reviewed. Pt on hold per NP this am due to medical status. Will re-attempt at later time. Thank you for consult.     Hu Tapia, OTR/L [id number]

## 2020-11-20 NOTE — CONSULTS
Comprehensive Nutrition Assessment    Type and Reason for Visit:  Initial, Consult    Nutrition Recommendations/Plan: Modify Tube Feeding  EN Recommendation: Immune Enhancing @40ml/hr to provide: 960ml, 1440kcals, 90gm pro, 728ml water    Nutrition Assessment:  Pt admit 2/2 seizure, SDH s/p crani. Noted dementia. Unable to complete bedside swallow, per speech video swallow as medically appopriate s/p corpak placement d/t risk of aspiration at this time. Malnutrition Assessment:  Malnutrition Status: At risk for malnutrition (Comment)    Context:  Acute Illness     Findings of the 6 clinical characteristics of malnutrition:  Energy Intake:  Mild decrease in energy intake (Comment)  Weight Loss:  Unable to assess     Body Fat Loss:  No significant body fat loss     Muscle Mass Loss:  No significant muscle mass loss    Fluid Accumulation:  No significant fluid accumulation     Strength:  Not Performed    Estimated Daily Nutrient Needs:  Energy (kcal):  MSJ 1151 x1.2= 1381; ; Weight Used for Energy Requirements:  Current     Protein (g):  80-95(1.3-1.5gm/kg CBW);  Weight Used for Protein Requirements:  Current          Nutrition Related Findings:  abd exam WDL, corpak, fluids WNL, +1 edema      Wounds:  Surgical Incision       Current Nutrition Therapies:    Current Tube Feeding (TF) Orders:  · Feeding Route: Nasoenteric  · Formula: 1.5 Calorie with Fiber  · Current TF & Flush Orders Provides: 45ml/hr; 1080ml TV, 1620kcals, 69gm pro, 821ml water    Anthropometric Measures:  · Height: 5' 4\" (162.6 cm)  · Current Body Weight: 137 lb (62.1 kg)(11/19 actual)   · Admission Body Weight: 135 lb (61.2 kg)(11/18 no method)    · Usual Body Weight: (UTO)     · Ideal Body Weight: 120 lbs; % Ideal Body Weight 114.2 %   · BMI: 23.5  · BMI Categories: Normal Weight (BMI 22.0 to 24.9) age over 72       Nutrition Diagnosis:   · Inadequate oral intake related to cognitive or neurological impairment as evidenced by NPO or clear liquid status due to medical condition, nutrition support - enteral nutrition    Nutrition Interventions:   Nutrition Education/Counseling:  Education not indicated   Coordination of Nutrition Care:  Continue to monitor while inpatient, Speech Therapy, Swallow Evaluation, Coordination of Community Care    Goals:   Tolerance to EN       Nutrition Monitoring and Evaluation:   Food/Nutrient Intake Outcomes:  Enteral Nutrition Intake/Tolerance  Physical Signs/Symptoms Outcomes:  Biochemical Data, Nutrition Focused Physical Findings, Skin, Weight, Chewing or Swallowing, GI Status, Fluid Status or Edema, Hemodynamic Status     Electronically signed by Eloisa Bamberger, MS, RD, LD on 11/20/20 at 1:53 PM EST

## 2020-11-20 NOTE — PROGRESS NOTES
[facility]  SURGICAL INTENSIVE CARE UNIT (SICU)  ATTENDING PHYSICIAN CRITICAL CARE PROGRESS NOTE     I have examined the patient, reviewed the record, and discussed the case with the APN/ resident. Please refer to the APN/ resident's note. I agree with the assessment and plan. I have reviewed all relevant labs and imaging data. The following summarizes my clinical findings and independent assessment.     CC:  Critical care management for [facility] Course/Overnight Events:  11/18--admitted after seizures and found to have SDH; underwent bethany hole crani  11/19--drain removed today  11/20--nothing new overnight    Non-verbal  Not following commands  Eyes to voice  Hrt:  Regular  Lungs:  Fairly clear bilaterally  Abd:  Soft; BS active; NT/ND  Skin:  Warm/dry    Patient Active Problem List    Diagnosis Date Noted    Seizure ([facility] [address] 11/18/2020    Subdural hematoma ([facility] [address] 11/18/2020    Head injury     Fall        SDH--s/p bethany hole crani--monitor neuro exam  Seizure--on keppra  Discuss with family TF vs allowing pt to eat with understanding of risk for aspiration  Electrolyte imbalance (hypomagnesemia/hypophosphatemia)--correct as able  Acute blood loss anemia--monitor H/H  Thrombocytopenia--monitor  Monitor hemodynamics  Pain control  PT/OT evals  DVT risk--PCDs      Juan Parekh MD, FACS  11/20/2020  10:50 AM

## 2020-11-20 NOTE — PROGRESS NOTES
Physical Therapy  Physical Therapy Attempt    Name: Rafaela Moya  :   MRN: [id number]      Date of Service: 2020  Chart reviewed. Spoke with NP who requested to hold due to medical instability. Will re-attempt as able.     Madai Hill, PT, DPT  [id number]

## 2020-11-20 NOTE — CONSULTS
Palliative Care Department  [phone]  Palliative Care Initial Consult  Provider Jesus Alexander APRN-CNP    Jayro Brown  [id number]  Hospital Day: 3  Date of Initial Consult: 11/19/20  Referring Provider: Dr. Rama Schmid was consulted for assistance with: Goals of care, family support, and code status discussion. HPI:   Jayro Brown is a 72 y.o. with a past medical history of seizure disorder and dementia who was admitted on 11/18/2020 from home with a CHIEF COMPLAINT of fall one week ago and seizures. She was transferred to [facility] from [facility] after being diagnosed with a subdural hematoma. CT of the head showed a large acute on chronic subdural collection on the left with 7 mm of a midline shift. She underwent a left frontal bethany hole craniotomy with subdural drain placement on 11/18/20 and was admitted to ICU. Patient failed nursing bedside swallow evaluation. Palliative care consulted to discuss goals of care, family support, and code status discussion. ASSESSMENT/PLAN:     Pertinent Hospital Diagnoses      Subdural hematoma S/P bethany hole craniotomy: Neurosurgery and neurology followingDiandra, for EEG.  History of dementia: On Aricept and Namenda. Palliative Care Encounter / Counseling Regarding Goals of Care  Please see detailed goals of care discussion as below   At this time, Jayro Brown, Does Not have capacity for medical decision-making. Capacity is time limited and situation/question specific   During encounter Janki was surrogate medical decision-maker   Outcome of goals of care meeting: Continue current care and reevaluate in next couple days.     Code status Full Code   Advanced Directives: no POA or living will in [facility]   Surrogate/Legal NOK:  o Ramses Killings (daughter): [phone]  o Simi Alvarez (sister): [phone]    Spiritual assessment: no spiritual distress identified  Bereavement and grief: to be determined  Referrals to: none today     SUBJECTIVE:     Details of Conversation:  Chart reviewed and patient seen at bedside. Patient is lethargic and nonverbal. She is in no apparent distress. She does respond to tactile stimuli but does not follow commands and pulls her hand away. Spoke with bedside RN and patient failed bedside swallow evaluation and is currently NPO. It is questionable if patient will be able to follow the cues to perform a formal swallow evaluation. Call to patient's daughter and Michael Diamond. Introduced palliative care and our role. Discussed patient status and her condition at present. Janki explains that prior to this hospitalization she lived with her sister, Meeta Esparza, and was active and would walk around the house and was able to feed herself. She would speak and it would not make sense but they talked to her as they normally had in the past. The only trouble she had with ADLs was getting her shower. Her decline in condition is a big change per Janki. Discussed her code status and Janki became tearful. She states she needs to discuss this with Meeta Esparza before making any changes. Had lengthy discussion of what a FULL CODE is and all of the components. Janki would like to continue FULL CODE STATUS at this time. Also discussed tube feedings as patient did not pass her swallow evaluation. Janki is agreeable to temporary tube feedings at this time and would like to see how patient progresses over the next couple days. Did discuss long term feedings with a PEG tube. She did not want to discuss this as of yet and wants to talk to Meetaerendira Esparza. Much emotional support provided via active listening and validation of feelings. Provided palliative care phone number for her to call with any questions or concerns. Palliative care will continue to follow.      Physical Function:  PPS: 10  PPS: 6 months ago: 60      OBJECTIVE:   Prognosis: depends upon goals and Guarded    Physical Exam:  /61   Pulse 51   Temp 97.8 °F (36.6 °C) (Temporal)   Resp 13   Ht 5' 4\" (1.626 m)   Wt 137 lb (62.1 kg)   SpO2 99%   BMI 23.52 kg/m²   Gen:  Elderly, thin, NAD, lethargic, briefly arouses to verbal stimuli   HEENT:  Normocephalic, atraumatic, mucosa dry  Neck:  Supple, trachea midline  Lungs:  Respirations unlabored  Heart[de-identified]  Sinus bradycardia on cardiac monitor   Abd:  Soft, non tender  : Wan   Ext:  Moving all extremities, no edema, pulses present  Skin:  Cool and dry; Left subdural bethany hole site   Neuro:  Lethargic, opens eyes to verbal stimuli, nonverbal, not following commands, response to tactile stimuli in BUE and BLE    Objective data reviewed: labs, images, records, medication use, vitals and chart    Discussed patient and the plan of care with the other IDT members: Palliative Medicine IDT Team, Floor Nurse and Family    Time/Communication  Greater than 50% of time spent, total 70 minutes in counseling and coordination of care at the bedside regarding goals of care. Thank you for allowing Palliative Medicine to participate in the care of Jayro Brown.

## 2020-11-20 NOTE — PROGRESS NOTES
Patient attempts to pull at corepak. Unable to verbally redirect at this time. Bilateral two point soft wrist restraints applied. Will monitor.

## 2020-11-20 NOTE — PLAN OF CARE
Problem: Restraint Use - Nonviolent/Non-Self-Destructive Behavior:  Goal: Absence of restraint indications  Description: Absence of restraint indications  Outcome: Met This Shift     Problem: Restraint Use - Nonviolent/Non-Self-Destructive Behavior:  Goal: Absence of restraint-related injury  Description: Absence of restraint-related injury  Outcome: Met This Shift

## 2020-11-20 NOTE — PROCEDURES
levetiracetam (KEPPRA) 750 mg/50 mL IVPB, 750 mg, Intravenous, Q12H, Debarah Pouch, PA, Stopped at 11/20/20 0351    sodium chloride flush 0.9 % injection 10 mL, 10 mL, Intravenous, PRN, Debarah Pouch, PA, 10 mL at 11/19/20 1142    promethazine (PHENERGAN) tablet 12.5 mg, 12.5 mg, Oral, Q6H PRN **OR** ondansetron (ZOFRAN) injection 4 mg, 4 mg, Intravenous, Q6H PRN, Debarah Pouch, PA    0.9 % sodium chloride infusion, , Intravenous, Continuous, Debarah Pouch, PA, Last Rate: 75 mL/hr at 11/19/20 1000        Physician Interpretation    General EEG Report  There is a low amplitude continuous slow generalized without identifiable PDR. Type of EEG >>  Routine, abnormal III          Epileptiform Discharge       General Impression  This EEG is consistent with a severe diffuse encephalopathy. No epileptiform activity is recorded.    Sharyle Corrigan, MD Bianca Sabina

## 2020-11-20 NOTE — PROGRESS NOTES
Physician Progress Note      PATIENT:               Katelyn Jack  [facility] #:                  [id number]  :                       1955  ADMIT DATE:       2020 1:08 AM  DISCH DATE:  RESPONDING  PROVIDER #:        Sakshi Quiñones MD          QUERY TEXT:    Dear Gustavo Campbell,  Pt admitted with Seizure, left-sided acute on chronic subdural hematoma . Pt   noted to have Large subdural collection on the left is either acute or acute   on chronic. There is 7 mm of midline shift per CT . If clinically   significant, please document in progress notes and discharge summary if you   are evaluating/treating any of the following: The medical record reflects the following:  Risk Factors: Seizure, baseline dementia  Clinical Indicators: CT head - Large subdural collection on the left is either   acute or acute on chronic. There is 7 mm of midline shift. Treatment: Left frontal bur hole drainage of left frontotemporal subdural   hematoma, placement of left subdural drain. Critical care monitoring, CT    Thank you,  Kristina Raymond RN  Clinical   [phone]  Options provided:  -- Cerebral edema  -- Brain compression  -- Cerebral edema and Brain compression  -- Other - I will add my own diagnosis  -- Disagree - Not applicable / Not valid  -- Disagree - Clinically unable to determine / Unknown  -- Refer to Clinical Documentation Reviewer    PROVIDER RESPONSE TEXT:    This patient has brain compression.     Query created by: Mrailee Camarillo on 2020 1:46 PM      Electronically signed by:  Sakshi Quiñones MD 2020 8:03 AM

## 2020-11-20 NOTE — PROGRESS NOTES
Intensive Care Unit  Critical Care Consult  Daily Progress Note 11/20/2020    Date of Admission: 11/18    EVENTS:   11/18--admitted after seizures and found to have SDH; underwent bethany hole crani  11/19 - 11/19--drain removed today  11/20 - No acute events, Afebrile, vss      PHYSICAL EXAM:    /61   Pulse 51   Temp 97.8 °F (36.6 °C) (Temporal)   Resp 13   Ht 5' 4\" (1.626 m)   Wt 137 lb (62.1 kg)   SpO2 99%   BMI 23.52 kg/m²     General appearance:  Comfortable. Pain Description: none    GCS:    3 - Opens eyes to loud noise or command   6 - Follows simple motor commands  4 - Seems confused, disoriented only answerers yes or no    Pupil size:  Left 3 mm  Right 3 mm  Pupil reaction: Yes  Wiggles fingers: Left Yes Right No  Hand grasp:   Left normal     Right absent  Wiggles toes: Left Yes    Right Yes  Plantar flexion: Left absent    Right absent    CONSTITUTIONAL: no acute distress, lying in hospital bed,   NEUROLOGIC: PERRL, patient moving all extremities but intermittently following commands, seems weaker with RUE  CARDIOVASCULAR: S1 S2, regular rate, regular rhythm, no murmur/gallop/rub. Monitor: sinus  PULMONARY: no rhonchi/rales/wheezes, no use of accessory muscles, RA  RENAL: villanueva to gravity, clear yellow urine  ABDOMEN: soft, nontender, nondistended, nontympanic, normal bowel sounds   SKIN/EXTREMITIES: no rashes/ecchymosis, no edema/clubbing, warm/dry, good capillary refill     ASSESSMENT/PLAN:       · Neuro:  SDH s/p bethany hole, seizure, aphasia Hx dementia, seizure. Monitor neuro status, Neurosurgery following, Neurology following,  Casper Jamesomons for seizure prophylaxis. Aricept, EEG, Namenda  · CV: No acute issues. Monitor hemodynamics. BP goal < 140. PRN hydralazine & labetalol. · Pulm: No acute issues. Monitor RR & SpO2. Pulmonary hygiene  · GI: Dysphagia. NPO. For swallow today. Monitor bowel function. · Renal: Hypokalemia, Hypomagnesia.  Monitor BUN & Cr. Monitor electrolytes & replace

## 2020-11-20 NOTE — PROGRESS NOTES
Department of Neurosurgery  Progress Note    CHIEF COMPLAINT: s/p bethany hole craniotomy 11/18    SUBJECTIVE:  Awake and alert. EEG being done now. REVIEW OF SYSTEMS :  Unable to obtain.      OBJECTIVE:   VITALS:  /61   Pulse 51   Temp 97.8 °F (36.6 °C) (Temporal)   Resp 13   Ht 5' 4\" (1.626 m)   Wt 137 lb (62.1 kg)   SpO2 99%   BMI 23.52 kg/m²     PHYSICAL:  Neurologic:  Awake and alert  Motor Exam:  Moving all extremities well  Sensory:  Sensory intact  Incision c/d/i      DATA:  CBC:   Lab Results   Component Value Date    WBC 5.7 11/20/2020    RBC 3.27 11/20/2020    HGB 10.1 11/20/2020    HCT 31.2 11/20/2020    MCV 95.4 11/20/2020    MCH 30.9 11/20/2020    MCHC 32.4 11/20/2020    RDW 12.9 11/20/2020    PLT 95 11/20/2020    MPV 11.6 11/20/2020     BMP:    Lab Results   Component Value Date     11/20/2020    K 3.5 11/20/2020     11/20/2020    CO2 24 11/20/2020    BUN 18 11/20/2020    LABALBU 3.0 11/20/2020    LABALBU 4.4 05/04/2012    CREATININE 0.6 11/20/2020    CALCIUM 9.5 11/20/2020    GFRAA >60 11/20/2020    LABGLOM >60 11/20/2020    GLUCOSE 62 11/20/2020    GLUCOSE 105 05/04/2012     PT/INR:    Lab Results   Component Value Date    PROTIME 10.8 11/18/2020    INR 1.0 11/18/2020     PTT:    Lab Results   Component Value Date    APTT 34.8 11/18/2020   [APTT}    Current Inpatient Medications  Current Facility-Administered Medications: magnesium sulfate 4 g in 100 mL IVPB premix, 4 g, Intravenous, Once  potassium chloride 10 mEq/100 mL IVPB (Peripheral Line), 10 mEq, Intravenous, Q1H  LORazepam (ATIVAN) injection 0.5 mg, 0.5 mg, Intravenous, Q8H PRN  melatonin disintegrating tablet 5 mg, 5 mg, Oral, Nightly PRN  sodium chloride flush 0.9 % injection 10 mL, 10 mL, Intravenous, 2 times per day  acetaminophen (TYLENOL) tablet 650 mg, 650 mg, Oral, Q4H  polyethylene glycol (GLYCOLAX) packet 17 g, 17 g, Oral, Daily PRN  donepezil (ARICEPT) tablet 10 mg, 10 mg, Oral, Nightly  LORazepam (ATIVAN) tablet 1 mg, 1 mg, Oral, BID PRN  memantine (NAMENDA) tablet 10 mg, 10 mg, Oral, BID  acetaminophen (TYLENOL) tablet 650 mg, 650 mg, Oral, Q4H PRN  atorvastatin (LIPITOR) tablet 80 mg, 80 mg, Oral, Nightly  levetiracetam (KEPPRA) 750 mg/50 mL IVPB, 750 mg, Intravenous, Q12H  sodium chloride flush 0.9 % injection 10 mL, 10 mL, Intravenous, PRN  promethazine (PHENERGAN) tablet 12.5 mg, 12.5 mg, Oral, Q6H PRN **OR** ondansetron (ZOFRAN) injection 4 mg, 4 mg, Intravenous, Q6H PRN  0.9 % sodium chloride infusion, , Intravenous, Continuous    ASSESSMENT:   s/p bethany hole craniotomy 11/18    PLAN:  -Continue ICU care  -Serial neurological exams  -Pain control  -No anticoagulation/antiplatelet agents  -Neurology following      Electronically signed by JOANNE Glover on 11/20/2020 at 10:06 AM

## 2020-11-20 NOTE — PROGRESS NOTES
Inserted small bowel feeding tube 95cm into left nares with envue guidance system ,bridled and xray ordered

## 2020-11-20 NOTE — PROGRESS NOTES
Speech Language Pathology      NAME:  Claritza Lagunas  :  1955  DATE: 2020  ROOM:  [id number]    Order received. Chart reviewed. Pt unavailable at this time due to:  [x] HOLD per RN, not appropriate for MBSS this date  [] Off unit for testing/ procedure    [] With medical staff   [] Declined intervention  [] Sleeping/ Lethargic   [] Other:     Will re-attempt later this date as able. Thank you.        Seizure ([facility] [address] [R56.9]  Seizure ([facility] [address] [R56.9]  Seizure ([facility] [address] [R56.9]

## 2020-11-20 NOTE — PROGRESS NOTES
Patient continues restlessness and agitation when stimulated in any way, reaches for critical lines and tubes. Unable to verbally redirect or reorient. Bilateral soft wrist restraints continued for patient safety.

## 2020-11-20 NOTE — PROGRESS NOTES
Hospitalist Progress Note      PCP: No primary care provider on file. Date of Admission: 11/18/2020    Chief Complaint: none    Hospital Course:    72 y.o. female with PMH of seizure disorder, dementia who was originally taken to [facility] emergency department for worsening mental status. Patient was reported to have had a fall episode a week prior to presentation and had been having witnessed seizures. CT head at outside hospital revealed left-sided subacute subdural hematoma so patient was transferred to this facility for further evaluation and management by a neurosurgeon.     On arrival to the emergency department here, repeat CT head showed large subdural collection on the left which is acute or acute on chronic associated with 7 mm midline shift; also had acute left sphenoid sinusitis. She was evaluated by neurosurgeon, underwent frontotemporal bethany holes craniotomy with left subdural drain placement; did drain at night been discontinued. Subjective: Pt was seen and examined in ICU. No acute event overnight; unable to complete ROS due to poor mental status.     Medications:  Reviewed    Infusion Medications    sodium chloride 75 mL/hr at 11/19/20 1000     Scheduled Medications    magnesium sulfate  4 g Intravenous Once    potassium chloride  10 mEq Intravenous Q1H    sodium chloride flush  10 mL Intravenous 2 times per day    acetaminophen  650 mg Oral Q4H    donepezil  10 mg Oral Nightly    memantine  10 mg Oral BID    atorvastatin  80 mg Oral Nightly    levetiracetam  750 mg Intravenous Q12H     PRN Meds: LORazepam, melatonin, polyethylene glycol, LORazepam, acetaminophen, sodium chloride flush, promethazine **OR** ondansetron      Intake/Output Summary (Last 24 hours) at 11/20/2020 0805  Last data filed at 11/20/2020 0700  Gross per 24 hour   Intake 2266 ml   Output 1030 ml   Net 1236 ml       Exam:    /61   Pulse 51   Temp 97.8 °F (36.6 °C) (Temporal)   Resp 13   Ht 5' 4\" (1.626 m)   Wt 137 lb (62.1 kg)   SpO2 99%   BMI 23.52 kg/m²     General appearance: Lying comfortably in bed. No apparent distress. HEENT: Lt frontotemporal craniotomy with stitches in place  Respiratory: Clear to auscultation bilaterally. Cardiovascular: Normal S1/S2. Regular rhythm and rate. Abdomen: Soft, non-tender, non-distended with normal bowel sounds. Musculoskeletal: No clubbing, cyanosis or edema bilaterally. Skin: Skin color, texture, turgor normal.  No rashes or lesions. Neurologic: Nonverbal, not interactive; did not follow any simple command  Psychiatric: Alert and oriented x 0. Not agitated   Peripheral Pulses: +2 palpable, equal bilaterally       Labs:   Recent Labs     11/18/20  0220 11/20/20  0430   WBC 6.5 5.7   HGB 12.8 10.1*   HCT 37.9 31.2*   * 95*     Recent Labs     11/18/20  0350 11/19/20  0553 11/20/20  0430     --  141   K 4.2  --  3.5     --  109*   CO2 25  --  24   BUN 16  --  18   CREATININE 0.6  --  0.6   CALCIUM 10.0  --  9.5   PHOS  --  2.7  --      Recent Labs     11/18/20 0350 11/20/20  0430   AST 29 25   ALT 25 14   BILITOT 0.5 0.4   ALKPHOS 64 54     Recent Labs     11/18/20  0350   INR 1.0     Recent Labs     11/18/20  0350   TROPONINI <0.01       Radiology:  CT HEAD WO CONTRAST   Final Result   Interval left frontal craniotomy with expected postoperative changes in the   soft tissues overlying the calvarium. Evolving left subdural hematoma, now measures 1.6 cm in thickness. Mild   interval improvement in left to right midline shift      CT Head WO Contrast   Final Result   1. Large subdural collection on the left is either acute or acute on chronic. There is 7 mm of midline shift. 2. Acute left sphenoid sinusitis. Critical results were called by Dr. Vanessa Reyes MD to [facility]   on 11/18/2020 at 04:21. CT Cervical Spine WO Contrast   Final Result   No acute abnormality of the cervical spine.       XR PELVIS (1-2 VIEWS)   Final Result   No fracture or malalignment. XR CHEST PORTABLE   Final Result   1. No acute traumatic abnormality identified. 2. Right perihilar nodule. Nonemergent routine chest CT scan is recommended   for further evaluation. Active Hospital Problems    Diagnosis Date Noted    Seizure [facility]) [R56.9] 11/18/2020    Subdural hematoma ([facility] [address] [A48.7R1O] 11/18/2020    Head injury [S09.90XA]     Fall [W19. XXXA]        Assessment  Left-sided subdural hematoma with midline shift - underwent left frontotemporal bethany holes craniotomy with left subdural drain placement on 11/18.   Drain was pulled on 11/19  Seizure - likely secondary to above, no repeat episode overnight  Acute metabolic encephalopathy   Suspected fall  Dementia      Plan:  EEG per Neurology  Continue with IV Keppra 750 mg twice daily  Modified barium swallow if patient is alert enough to participate  May need to insert CorPak for tube feed  Palliative care on board, will discuss with family goal of care  Gentle IV hydration  PRN analgesia      DVT Prophylaxis: SCDs  Diet: Diet NPO Effective Now Exceptions are: Sips with Meds  Code Status: Prior    PT/OT Eval Status: pending    Dispo - TBD    Casie Viveros MD 11/20/2020 8:05 AM

## 2020-11-21 LAB
ALBUMIN SERPL-MCNC: 2 G/DL (ref 3.5–5.2)
ALP BLD-CCNC: 38 U/L (ref 35–104)
ALT SERPL-CCNC: 9 U/L (ref 0–32)
ANION GAP SERPL CALCULATED.3IONS-SCNC: 4 MMOL/L (ref 7–16)
AST SERPL-CCNC: 16 U/L (ref 0–31)
BASOPHILS ABSOLUTE: 0 E9/L (ref 0–0.2)
BASOPHILS RELATIVE PERCENT: 0 % (ref 0–2)
BILIRUB SERPL-MCNC: 0.3 MG/DL (ref 0–1.2)
BUN BLDV-MCNC: 14 MG/DL (ref 8–23)
CALCIUM IONIZED: 1.11 MMOL/L (ref 1.15–1.33)
CALCIUM SERPL-MCNC: 6.5 MG/DL (ref 8.6–10.2)
CHLORIDE BLD-SCNC: 116 MMOL/L (ref 98–107)
CO2: 21 MMOL/L (ref 22–29)
CREAT SERPL-MCNC: 0.4 MG/DL (ref 0.5–1)
EOSINOPHILS ABSOLUTE: 0.03 E9/L (ref 0.05–0.5)
EOSINOPHILS RELATIVE PERCENT: 0.8 % (ref 0–6)
GFR AFRICAN AMERICAN: >60
GFR NON-AFRICAN AMERICAN: >60 ML/MIN/1.73
GLUCOSE BLD-MCNC: 107 MG/DL (ref 74–99)
HCT VFR BLD CALC: 28.4 % (ref 34–48)
HEMOGLOBIN: 9.5 G/DL (ref 11.5–15.5)
IMMATURE GRANULOCYTES #: 0.02 E9/L
IMMATURE GRANULOCYTES %: 0.5 % (ref 0–5)
LYMPHOCYTES ABSOLUTE: 0.91 E9/L (ref 1.5–4)
LYMPHOCYTES RELATIVE PERCENT: 24.9 % (ref 20–42)
MAGNESIUM: 1.4 MG/DL (ref 1.6–2.6)
MCH RBC QN AUTO: 30.6 PG (ref 26–35)
MCHC RBC AUTO-ENTMCNC: 33.5 % (ref 32–34.5)
MCV RBC AUTO: 91.6 FL (ref 80–99.9)
MONOCYTES ABSOLUTE: 0.23 E9/L (ref 0.1–0.95)
MONOCYTES RELATIVE PERCENT: 6.3 % (ref 2–12)
NEUTROPHILS ABSOLUTE: 2.46 E9/L (ref 1.8–7.3)
NEUTROPHILS RELATIVE PERCENT: 67.5 % (ref 43–80)
PDW BLD-RTO: 12.9 FL (ref 11.5–15)
PHOSPHORUS: 1.7 MG/DL (ref 2.5–4.5)
PLATELET # BLD: 94 E9/L (ref 130–450)
PLATELET CONFIRMATION: NORMAL
PMV BLD AUTO: 11 FL (ref 7–12)
POTASSIUM REFLEX MAGNESIUM: 2.7 MMOL/L (ref 3.5–5)
POTASSIUM SERPL-SCNC: 2.7 MMOL/L (ref 3.5–5)
RBC # BLD: 3.1 E12/L (ref 3.5–5.5)
SODIUM BLD-SCNC: 141 MMOL/L (ref 132–146)
TOTAL PROTEIN: 3.6 G/DL (ref 6.4–8.3)
TRIGL SERPL-MCNC: 42 MG/DL (ref 0–149)
WBC # BLD: 3.7 E9/L (ref 4.5–11.5)

## 2020-11-21 PROCEDURE — 80048 BASIC METABOLIC PNL TOTAL CA: CPT

## 2020-11-21 PROCEDURE — 6360000002 HC RX W HCPCS: Performed by: PHYSICIAN ASSISTANT

## 2020-11-21 PROCEDURE — 6370000000 HC RX 637 (ALT 250 FOR IP): Performed by: HOSPITALIST

## 2020-11-21 PROCEDURE — 2000000000 HC ICU R&B

## 2020-11-21 PROCEDURE — 36415 COLL VENOUS BLD VENIPUNCTURE: CPT

## 2020-11-21 PROCEDURE — 84478 ASSAY OF TRIGLYCERIDES: CPT

## 2020-11-21 PROCEDURE — 84100 ASSAY OF PHOSPHORUS: CPT

## 2020-11-21 PROCEDURE — 2580000003 HC RX 258: Performed by: INTERNAL MEDICINE

## 2020-11-21 PROCEDURE — 6360000002 HC RX W HCPCS: Performed by: SURGERY

## 2020-11-21 PROCEDURE — 92610 EVALUATE SWALLOWING FUNCTION: CPT | Performed by: SPEECH-LANGUAGE PATHOLOGIST

## 2020-11-21 PROCEDURE — 99233 SBSQ HOSP IP/OBS HIGH 50: CPT | Performed by: SURGERY

## 2020-11-21 PROCEDURE — 82330 ASSAY OF CALCIUM: CPT

## 2020-11-21 PROCEDURE — 2500000003 HC RX 250 WO HCPCS: Performed by: INTERNAL MEDICINE

## 2020-11-21 PROCEDURE — 2580000003 HC RX 258: Performed by: STUDENT IN AN ORGANIZED HEALTH CARE EDUCATION/TRAINING PROGRAM

## 2020-11-21 PROCEDURE — 92523 SPEECH SOUND LANG COMPREHEN: CPT | Performed by: SPEECH-LANGUAGE PATHOLOGIST

## 2020-11-21 PROCEDURE — 6360000002 HC RX W HCPCS: Performed by: INTERNAL MEDICINE

## 2020-11-21 PROCEDURE — 83735 ASSAY OF MAGNESIUM: CPT

## 2020-11-21 PROCEDURE — 85025 COMPLETE CBC W/AUTO DIFF WBC: CPT

## 2020-11-21 PROCEDURE — 80053 COMPREHEN METABOLIC PANEL: CPT

## 2020-11-21 PROCEDURE — 2580000003 HC RX 258: Performed by: SURGERY

## 2020-11-21 PROCEDURE — 6370000000 HC RX 637 (ALT 250 FOR IP): Performed by: STUDENT IN AN ORGANIZED HEALTH CARE EDUCATION/TRAINING PROGRAM

## 2020-11-21 PROCEDURE — 2580000003 HC RX 258: Performed by: PHYSICIAN ASSISTANT

## 2020-11-21 PROCEDURE — 6370000000 HC RX 637 (ALT 250 FOR IP): Performed by: SURGERY

## 2020-11-21 RX ORDER — MAGNESIUM SULFATE IN WATER 40 MG/ML
2 INJECTION, SOLUTION INTRAVENOUS ONCE
Status: COMPLETED | OUTPATIENT
Start: 2020-11-21 | End: 2020-11-21

## 2020-11-21 RX ORDER — POTASSIUM CHLORIDE 7.45 MG/ML
10 INJECTION INTRAVENOUS
Status: COMPLETED | OUTPATIENT
Start: 2020-11-21 | End: 2020-11-21

## 2020-11-21 RX ADMIN — SODIUM CHLORIDE: 9 INJECTION, SOLUTION INTRAVENOUS at 13:51

## 2020-11-21 RX ADMIN — Medication 10 ML: at 19:23

## 2020-11-21 RX ADMIN — LORAZEPAM 0.5 MG: 2 INJECTION INTRAMUSCULAR; INTRAVENOUS at 01:05

## 2020-11-21 RX ADMIN — ACETAMINOPHEN 650 MG: 325 TABLET ORAL at 18:12

## 2020-11-21 RX ADMIN — Medication 10 ML: at 08:23

## 2020-11-21 RX ADMIN — SODIUM CHLORIDE: 9 INJECTION, SOLUTION INTRAVENOUS at 13:17

## 2020-11-21 RX ADMIN — POTASSIUM CHLORIDE 10 MEQ: 10 INJECTION, SOLUTION INTRAVENOUS at 13:00

## 2020-11-21 RX ADMIN — POTASSIUM CHLORIDE 10 MEQ: 10 INJECTION, SOLUTION INTRAVENOUS at 09:31

## 2020-11-21 RX ADMIN — ATORVASTATIN CALCIUM 80 MG: 80 TABLET, FILM COATED ORAL at 19:22

## 2020-11-21 RX ADMIN — MAGNESIUM SULFATE HEPTAHYDRATE 2 G: 40 INJECTION, SOLUTION INTRAVENOUS at 10:33

## 2020-11-21 RX ADMIN — POTASSIUM CHLORIDE 10 MEQ: 10 INJECTION, SOLUTION INTRAVENOUS at 10:41

## 2020-11-21 RX ADMIN — LEVETIRACETAM 750 MG: 15 INJECTION INTRAVENOUS at 13:51

## 2020-11-21 RX ADMIN — ACETAMINOPHEN 650 MG: 325 TABLET ORAL at 04:16

## 2020-11-21 RX ADMIN — SODIUM PHOSPHATE, MONOBASIC, MONOHYDRATE AND SODIUM PHOSPHATE, DIBASIC, ANHYDROUS 15 MMOL: 276; 142 INJECTION, SOLUTION INTRAVENOUS at 06:08

## 2020-11-21 RX ADMIN — DONEPEZIL HYDROCHLORIDE 10 MG: 5 TABLET, FILM COATED ORAL at 19:22

## 2020-11-21 RX ADMIN — CALCIUM GLUCONATE 1 G: 98 INJECTION, SOLUTION INTRAVENOUS at 15:43

## 2020-11-21 RX ADMIN — POTASSIUM CHLORIDE 10 MEQ: 10 INJECTION, SOLUTION INTRAVENOUS at 08:14

## 2020-11-21 RX ADMIN — Medication 5 MG: at 20:28

## 2020-11-21 RX ADMIN — LORAZEPAM 0.5 MG: 2 INJECTION INTRAMUSCULAR; INTRAVENOUS at 19:19

## 2020-11-21 RX ADMIN — POTASSIUM CHLORIDE 10 MEQ: 10 INJECTION, SOLUTION INTRAVENOUS at 11:44

## 2020-11-21 RX ADMIN — POTASSIUM CHLORIDE 10 MEQ: 10 INJECTION, SOLUTION INTRAVENOUS at 05:26

## 2020-11-21 RX ADMIN — MEMANTINE HYDROCHLORIDE 10 MG: 10 TABLET, FILM COATED ORAL at 19:22

## 2020-11-21 RX ADMIN — ACETAMINOPHEN 650 MG: 325 TABLET ORAL at 20:28

## 2020-11-21 RX ADMIN — LEVETIRACETAM 750 MG: 15 INJECTION INTRAVENOUS at 01:05

## 2020-11-21 RX ADMIN — ACETAMINOPHEN 650 MG: 325 TABLET ORAL at 13:51

## 2020-11-21 RX ADMIN — MEMANTINE HYDROCHLORIDE 10 MG: 10 TABLET, FILM COATED ORAL at 08:13

## 2020-11-21 RX ADMIN — ACETAMINOPHEN 650 MG: 325 TABLET ORAL at 09:31

## 2020-11-21 NOTE — PROGRESS NOTES
[facility]  SURGICAL INTENSIVE CARE UNIT (SICU)  ATTENDING PHYSICIAN CRITICAL CARE PROGRESS NOTE     I have examined the patient, reviewed the record, and discussed the case with the APN/ resident. Please refer to the APN/ resident's note. I agree with the assessment and plan. I have reviewed all relevant labs and imaging data. The following summarizes my clinical findings and independent assessment.     CC:  Critical care management for [facility] Course/Overnight Events:  11/18--admitted after seizures and found to have SDH; underwent bethany hole crani  11/19--drain removed today  11/20--nothing new overnight  11/21--no new issues--seems more alert and animated    Mumbling speech  Not following commands  Eyes open  Hrt:  Regular  Lungs:  Fairly clear bilaterally  Abd:  Soft; BS active; NT/ND  Skin:  Warm/dry    Patient Active Problem List    Diagnosis Date Noted    Seizure ([facility] [address] 11/18/2020    Subdural hematoma ([facility] [address] 11/18/2020    Head injury     Fall        SDH--s/p bethany hole crani--monitor neuro exam  Seizure--on keppra  Hypoalbuminemia/moderate protein calorie malnutrition--corpak in place--on TF; for swallow eval  Electrolyte imbalance (hypokalemia/hypomagnesemia/hypophosphatemia)--correct as able  Acute blood loss anemia--monitor H/H  Thrombocytopenia--monitor  Monitor hemodynamics  Pain control  PT/OT evals  DVT risk--PCDs      Yolis Cruz MD, FACS  11/21/2020  8:35 AM

## 2020-11-21 NOTE — PROGRESS NOTES
SPEECH/LANGUAGE PATHOLOGY  CLINICAL ASSESSMENT OF SWALLOWING FUNCTION    PATIENT NAME:  Sherryle Blue      :  1955      TODAY'S DATE:  2020  ROOM:  200-A    SUMMARY OF EVALUATION    RN cleared Pt for participation in assessment?: yes discussed MBSS with nursing not sure if patient alert enough to complete requested assess bedside first.     DYSPHAGIA DIAGNOSIS:  Clinical indicators consistent with dysphagia       DIET RECOMMENDATIONS:  NPO (nothing by mouth including oral meds)   Continue with NG tube until MBSS can be completed          FEEDING RECOMMENDATIONS:     Assistance level:  Not applicable      Compensatory strategies recommended: Not applicable    THERAPY RECOMMENDATIONS:      Dysphagia therapy is recommended 3-5 times per week for LOS or when goals are met. Pt will complete oral motor strength/ coordination exercises to improve bolus prep/ control and mastication with  moderate verbal prompts . Pt will complete BOTR strength/ ROM exercises to reduce pharyngeal residuals and improve epiglottic inversion with  moderate verbal prompts. Pt will complete laryngeal strength/ ROM therapeutic exercises to improve airway protection for the least restrictive PO diet with  moderate verbal prompts   Pt will complete PO trials of upgraded diet textures with SLP only to determine the least restrictive PO diet to maintain adequate nutrition/hydration with no more than 1 overt s/s of pen/asp. Instruction regarding appropriate implementation of compensatory strategies to improve integrity of swallow function during PO intake    A Video Swallow Study (MBSS) is recommended and ordered but given her decreased ability to remain alert recommend holding off on MBSS for a couple of days especially since she has an NG tube in place for nutrition/hydration and meds.      Reviewed above recommendation with nursing     PAST HISTORY OF DYSPHAGIA?:unclear  Diet PRIOR to hospital admission:      Unclear COGNITION: Alert & Oriented x 0, Follows 0 - step directions appropriate for this assessment, Confusion noted, Attention impaired and Language impaired                PROCEDURE     Oral Peripheral Examination   Generalized oral weakness    Current Respiratory Status   room air    Parameters of Speech Production  Respiration:  Adequate for speech production  Quality:   Within functional limits  Intensity: Quiet    Volitional Swallow: Absent   Volitional Cough:    Absent     Consistencies Administered During the Evaluation   Liquids: pudding thick liquid   Solids:  pureed foods      Method of Intake:   spoon  Fed by clinician      Position:   Seated, upright                  RESULTS     Oral Stage:         Delayed A-P transit due to: cognitive function patient inconsistently opened mouth to spoon presentation. Delayed oral phase with need for verbal cues to initiate swallow at times. no oral residue after swallow very difficulty to evaluate vocal quality due to poor follow through of directions. Pharyngeal Stage:      Throat clearing present after presentation of pudding consistency liquid and pureed foods inconsistently                   The Speech Language Pathologist (SLP) completed education with the patient regarding results of evaluation. Explained that Speech Pathology intervention is warranted  at this time   Prognosis for improvements is fair     This plan will be re-evaluated and revised in 1 week  if warranted. Patient stated goals: Could not state,   Treatment goals discussed with Patient   The Patient did not demonstrate understanding of the diagnosis, prognosis and plan of care       CPT code:  89114  bedside swallow eval      [x]The admitting diagnosis and active problem list, as listed below have been reviewed prior to initiation of this evaluation.      ADMITTING DIAGNOSIS: Seizure ([address] [R56.9]  Seizure ([address] [R56.9]  Seizure ([address] [R56.9]     ACTIVE PROBLEM LIST:   Patient Active Problem List   Diagnosis    Seizure ([facility] [address]    Subdural hematoma ([facility] [address]    Head injury    Fall       Pachecoral Teri MSCCC/SLP  Speech Language Pathologist  [phone]

## 2020-11-21 NOTE — PLAN OF CARE
Neurology following peripherally  No reported seizures by staff -- remains on Keppra BID    Will follow as needed     EEG consistent with encephalopathy and no epileptiform activity was visualized

## 2020-11-21 NOTE — PROGRESS NOTES
Hospitalist Progress Note      PCP: No primary care provider on file. Date of Admission: 11/18/2020    Chief Complaint: none    Hospital Course:    72 y.o. female with PMH of seizure disorder, dementia who was originally taken to [facility] emergency department for worsening mental status. Patient was reported to have had a fall episode a week prior to presentation and had been having witnessed seizures. CT head at outside hospital revealed left-sided subacute subdural hematoma so patient was transferred to this facility for further evaluation and management by a neurosurgeon.     On arrival to the emergency department here, repeat CT head showed large subdural collection on the left which is acute or acute on chronic associated with 7 mm midline shift; also had acute left sphenoid sinusitis. She was evaluated by neurosurgeon, underwent frontotemporal bethany holes craniotomy with left subdural drain placement; did drain at night been discontinued.     Subjective:     Medications:  Reviewed    Infusion Medications    sodium chloride 75 mL/hr at 11/19/20 1000     Scheduled Medications    sodium phosphate IVPB  15 mmol Intravenous Once    potassium chloride  10 mEq Intravenous Q1H    magnesium sulfate  2 g Intravenous Once    sodium chloride flush  10 mL Intravenous 2 times per day    acetaminophen  650 mg Oral Q4H    donepezil  10 mg Oral Nightly    memantine  10 mg Oral BID    atorvastatin  80 mg Oral Nightly    levetiracetam  750 mg Intravenous Q12H     PRN Meds: LORazepam, melatonin, polyethylene glycol, LORazepam, acetaminophen, sodium chloride flush, promethazine **OR** ondansetron      Intake/Output Summary (Last 24 hours) at 11/21/2020 0855  Last data filed at 11/21/2020 0800  Gross per 24 hour   Intake 1470 ml   Output 870 ml   Net 600 ml       Exam:    BP (!) 168/92   Pulse 68   Temp 97.6 °F (36.4 °C) (Core)   Resp 14   Ht 5' 4\" (1.626 m)   Wt 137 lb (62.1 kg)   SpO2 100% BMI 23.52 kg/m²     General appearance: Lying comfortably in bed. No apparent distress. HEENT: Lt frontotemporal craniotomy with stitches in place  Respiratory: Clear to auscultation bilaterally. Cardiovascular: Normal S1/S2. Regular rhythm and rate. Abdomen: Soft, non-tender, non-distended with normal bowel sounds. Musculoskeletal: No clubbing, cyanosis or edema bilaterally. Skin: Skin color, texture, turgor normal.  No rashes or lesions. Neurologic: Nonverbal, not interactive; did not follow any simple command      Labs:   Recent Labs     11/20/20 0430 11/21/20  0400   WBC 5.7 3.7*   HGB 10.1* 9.5*   HCT 31.2* 28.4*   PLT 95* 94*     Recent Labs     11/19/20  0553 11/20/20 0430 11/21/20  0400   NA  --  141 141   K  --  3.5 2.7*  2.7*   CL  --  109* 116*   CO2  --  24 21*   BUN  --  18 14   CREATININE  --  0.6 0.4*   CALCIUM  --  9.5 6.5*   PHOS 2.7 2.4* 1.7*     Recent Labs     11/20/20 0430 11/21/20  0400   AST 25 16   ALT 14 9   BILITOT 0.4 0.3   ALKPHOS 54 38     No results for input(s): INR in the last 72 hours. No results for input(s): Leonora Breeze in the last 72 hours. Radiology:  XR ABDOMEN (KUB) (SINGLE AP VIEW)   Final Result   Distal tip of a enteric tube is present in the right upper quadrant of the   abdomen, in the expected region of the distal stomach      CT HEAD WO CONTRAST   Final Result   Interval left frontal craniotomy with expected postoperative changes in the   soft tissues overlying the calvarium. Evolving left subdural hematoma, now measures 1.6 cm in thickness. Mild   interval improvement in left to right midline shift      CT Head WO Contrast   Final Result   1. Large subdural collection on the left is either acute or acute on chronic. There is 7 mm of midline shift. 2. Acute left sphenoid sinusitis. Critical results were called by Dr. Edison Iraheta MD to [facility]   on 11/18/2020 at 04:21.       CT Cervical Spine WO Contrast   Final Result   No acute abnormality of the cervical spine. XR PELVIS (1-2 VIEWS)   Final Result   No fracture or malalignment. XR CHEST PORTABLE   Final Result   1. No acute traumatic abnormality identified. 2. Right perihilar nodule. Nonemergent routine chest CT scan is recommended   for further evaluation. FL MODIFIED BARIUM SWALLOW W VIDEO    (Results Pending)             Active Hospital Problems    Diagnosis Date Noted    Seizure [facility]) [R56.9] 11/18/2020    Subdural hematoma ([address] [P66.6I1O] 11/18/2020    Head injury [S09.90XA]     Fall [W19. XXXA]        Assessment  Left-sided subdural hematoma with midline shift - underwent left frontotemporal bethany holes craniotomy with left subdural drain placement on 11/18. Drain was pulled on 11/19  Seizure - likely secondary to above  Acute metabolic encephalopathy   Suspected fall  Dementia  Hypokalemia      Plan:  EEG per Neurology  Continue with IV Keppra 750 mg twice daily  Modified barium swallow if patient is alert enough to participate  May need to insert CorPak for tube feed  Palliative care on board, will discuss with family goal of care  Gentle IV hydration  PRN analgesia  Monitor serum electrolytes, replete as needed      DVT Prophylaxis: SCDs  Diet: DIET TUBE FEED CONTINUOUS/CYCLIC NPO;  Immune Enhancing; Nasoenteric; Continuous; 20; 40; 24; Exceptions are: Sips with Meds  Code Status: Full Code    PT/OT Eval Status: pending    Dispo - TBD    Taina Xiong DO 11/21/2020 8:55 AM

## 2020-11-21 NOTE — PROGRESS NOTES
unable   Delayed recall:  recalled unable /3 words    Organization/Problem Solving/Reasoning   Verbal Sequencing: To be assessed     Problem solving (verbal): To be assessed     Mathematics: (Simple arithmetic) : To be assessed    CLINICAL OBSERVATIONS NOTED DURING THE EVALUATION  Perseveration errors and Paraphasic errors                The Speech Language Pathologist (SLP) completed education with the patient regarding results of evaluation. Explained that Speech Pathology intervention is warranted  at this time   Prognosis for improvements is fair +     This plan will be re-evaluated and revised in 1 week  if warranted. Patient stated goals: Could not state,   Treatment goals discussed with Patient   The Patient did not demonstrate understanding of the diagnosis, prognosis and plan of care     Evaluation time includes  review of current medical information, gathering information on past medical history/social history and prior level of function, completion of standardized testing/informal observation of tasks, assessment of data, and development of POC/Goals. CPT code:    56168  eval speech sound lang comprehension    The admitting diagnosis and active problem list, as listed below have been reviewed prior to initiation of this evaluation.      ADMITTING DIAGNOSIS: Seizure ([address] [R56.9]  Seizure ([address] [R56.9]  Seizure ([address] [R56.9]     ACTIVE PROBLEM LIST:   Patient Active Problem List   Diagnosis    Seizure ([address]    Subdural hematoma ([address]    Head injury    Fall       Maggi Lung MSCCC/SLP  Speech Language Pathologist  [id number]

## 2020-11-21 NOTE — FLOWSHEET NOTE
Attempts to pull at iv lines and corpak when care given,unable to redirect. Bilateral soft wrist restraints continued for safety.

## 2020-11-21 NOTE — PLAN OF CARE
Problem: Restraint Use - Nonviolent/Non-Self-Destructive Behavior:  Goal: Absence of restraint indications  Description: Absence of restraint indications  11/21/2020 1831 by Juan Merritt RN  Outcome: Not Met This Shift     Problem: Restraint Use - Nonviolent/Non-Self-Destructive Behavior:  Goal: Absence of restraint-related injury  Description: Absence of restraint-related injury  11/21/2020 1831 by Juan Merritt RN  Outcome: Met This Shift

## 2020-11-22 LAB
ALBUMIN SERPL-MCNC: 2.7 G/DL (ref 3.5–5.2)
ALP BLD-CCNC: 50 U/L (ref 35–104)
ALT SERPL-CCNC: 13 U/L (ref 0–32)
ANION GAP SERPL CALCULATED.3IONS-SCNC: 9 MMOL/L (ref 7–16)
AST SERPL-CCNC: 21 U/L (ref 0–31)
BASOPHILS ABSOLUTE: 0.01 E9/L (ref 0–0.2)
BASOPHILS RELATIVE PERCENT: 0.3 % (ref 0–2)
BILIRUB SERPL-MCNC: 0.4 MG/DL (ref 0–1.2)
BUN BLDV-MCNC: 13 MG/DL (ref 8–23)
CALCIUM IONIZED: 1.38 MMOL/L (ref 1.15–1.33)
CALCIUM SERPL-MCNC: 8.6 MG/DL (ref 8.6–10.2)
CHLORIDE BLD-SCNC: 109 MMOL/L (ref 98–107)
CO2: 25 MMOL/L (ref 22–29)
CREAT SERPL-MCNC: 0.4 MG/DL (ref 0.5–1)
EOSINOPHILS ABSOLUTE: 0.03 E9/L (ref 0.05–0.5)
EOSINOPHILS RELATIVE PERCENT: 0.8 % (ref 0–6)
GFR AFRICAN AMERICAN: >60
GFR NON-AFRICAN AMERICAN: >60 ML/MIN/1.73
GLUCOSE BLD-MCNC: 87 MG/DL (ref 74–99)
HCT VFR BLD CALC: 31.5 % (ref 34–48)
HEMOGLOBIN: 10.8 G/DL (ref 11.5–15.5)
IMMATURE GRANULOCYTES #: 0.02 E9/L
IMMATURE GRANULOCYTES %: 0.6 % (ref 0–5)
LYMPHOCYTES ABSOLUTE: 0.9 E9/L (ref 1.5–4)
LYMPHOCYTES RELATIVE PERCENT: 25.4 % (ref 20–42)
MAGNESIUM: 1.5 MG/DL (ref 1.6–2.6)
MCH RBC QN AUTO: 31.5 PG (ref 26–35)
MCHC RBC AUTO-ENTMCNC: 34.3 % (ref 32–34.5)
MCV RBC AUTO: 91.8 FL (ref 80–99.9)
MONOCYTES ABSOLUTE: 0.3 E9/L (ref 0.1–0.95)
MONOCYTES RELATIVE PERCENT: 8.5 % (ref 2–12)
NEUTROPHILS ABSOLUTE: 2.29 E9/L (ref 1.8–7.3)
NEUTROPHILS RELATIVE PERCENT: 64.4 % (ref 43–80)
PDW BLD-RTO: 12.8 FL (ref 11.5–15)
PHOSPHORUS: 2.9 MG/DL (ref 2.5–4.5)
PLATELET # BLD: 107 E9/L (ref 130–450)
PMV BLD AUTO: 11.5 FL (ref 7–12)
POTASSIUM REFLEX MAGNESIUM: 4.1 MMOL/L (ref 3.5–5)
POTASSIUM SERPL-SCNC: 4.1 MMOL/L (ref 3.5–5)
RBC # BLD: 3.43 E12/L (ref 3.5–5.5)
SODIUM BLD-SCNC: 143 MMOL/L (ref 132–146)
TOTAL PROTEIN: 5 G/DL (ref 6.4–8.3)
WBC # BLD: 3.6 E9/L (ref 4.5–11.5)

## 2020-11-22 PROCEDURE — 36415 COLL VENOUS BLD VENIPUNCTURE: CPT

## 2020-11-22 PROCEDURE — 80053 COMPREHEN METABOLIC PANEL: CPT

## 2020-11-22 PROCEDURE — 6360000002 HC RX W HCPCS: Performed by: PHYSICIAN ASSISTANT

## 2020-11-22 PROCEDURE — 84100 ASSAY OF PHOSPHORUS: CPT

## 2020-11-22 PROCEDURE — APPSS15 APP SPLIT SHARED TIME 0-15 MINUTES: Performed by: NURSE PRACTITIONER

## 2020-11-22 PROCEDURE — 80048 BASIC METABOLIC PNL TOTAL CA: CPT

## 2020-11-22 PROCEDURE — 6370000000 HC RX 637 (ALT 250 FOR IP): Performed by: HOSPITALIST

## 2020-11-22 PROCEDURE — 6360000002 HC RX W HCPCS: Performed by: NURSE PRACTITIONER

## 2020-11-22 PROCEDURE — 99233 SBSQ HOSP IP/OBS HIGH 50: CPT | Performed by: SURGERY

## 2020-11-22 PROCEDURE — 6370000000 HC RX 637 (ALT 250 FOR IP): Performed by: STUDENT IN AN ORGANIZED HEALTH CARE EDUCATION/TRAINING PROGRAM

## 2020-11-22 PROCEDURE — 6370000000 HC RX 637 (ALT 250 FOR IP): Performed by: NURSE PRACTITIONER

## 2020-11-22 PROCEDURE — 2140000000 HC CCU INTERMEDIATE R&B

## 2020-11-22 PROCEDURE — 85025 COMPLETE CBC W/AUTO DIFF WBC: CPT

## 2020-11-22 PROCEDURE — 82330 ASSAY OF CALCIUM: CPT

## 2020-11-22 PROCEDURE — 83735 ASSAY OF MAGNESIUM: CPT

## 2020-11-22 PROCEDURE — 2580000003 HC RX 258: Performed by: STUDENT IN AN ORGANIZED HEALTH CARE EDUCATION/TRAINING PROGRAM

## 2020-11-22 RX ORDER — DONEPEZIL HYDROCHLORIDE 5 MG/1
10 TABLET, FILM COATED ORAL NIGHTLY
Status: DISCONTINUED | OUTPATIENT
Start: 2020-11-22 | End: 2020-11-30 | Stop reason: HOSPADM

## 2020-11-22 RX ORDER — LEVETIRACETAM 100 MG/ML
750 SOLUTION ORAL 2 TIMES DAILY
Status: DISCONTINUED | OUTPATIENT
Start: 2020-11-22 | End: 2020-11-30 | Stop reason: HOSPADM

## 2020-11-22 RX ORDER — LEVETIRACETAM 100 MG/ML
750 SOLUTION ORAL 2 TIMES DAILY
Status: DISCONTINUED | OUTPATIENT
Start: 2020-11-22 | End: 2020-11-22

## 2020-11-22 RX ORDER — MAGNESIUM SULFATE IN WATER 40 MG/ML
4 INJECTION, SOLUTION INTRAVENOUS ONCE
Status: COMPLETED | OUTPATIENT
Start: 2020-11-22 | End: 2020-11-22

## 2020-11-22 RX ORDER — LEVETIRACETAM 15 MG/ML
750 INJECTION INTRAVASCULAR 2 TIMES DAILY
Status: DISCONTINUED | OUTPATIENT
Start: 2020-11-22 | End: 2020-11-22

## 2020-11-22 RX ORDER — POLYETHYLENE GLYCOL 3350 17 G/17G
17 POWDER, FOR SOLUTION ORAL DAILY PRN
Status: DISCONTINUED | OUTPATIENT
Start: 2020-11-22 | End: 2020-11-30 | Stop reason: HOSPADM

## 2020-11-22 RX ORDER — ACETAMINOPHEN 325 MG/1
650 TABLET ORAL EVERY 4 HOURS PRN
Status: DISCONTINUED | OUTPATIENT
Start: 2020-11-22 | End: 2020-11-30 | Stop reason: HOSPADM

## 2020-11-22 RX ORDER — LEVETIRACETAM 15 MG/ML
750 INJECTION INTRAVASCULAR 2 TIMES DAILY
Status: DISCONTINUED | OUTPATIENT
Start: 2020-11-22 | End: 2020-11-30 | Stop reason: HOSPADM

## 2020-11-22 RX ORDER — MEMANTINE HYDROCHLORIDE 10 MG/1
10 TABLET ORAL 2 TIMES DAILY
Status: DISCONTINUED | OUTPATIENT
Start: 2020-11-22 | End: 2020-11-30 | Stop reason: HOSPADM

## 2020-11-22 RX ORDER — ATORVASTATIN CALCIUM 80 MG/1
80 TABLET, FILM COATED ORAL NIGHTLY
Status: DISCONTINUED | OUTPATIENT
Start: 2020-11-22 | End: 2020-11-30 | Stop reason: HOSPADM

## 2020-11-22 RX ADMIN — MAGNESIUM SULFATE HEPTAHYDRATE 4 G: 40 INJECTION, SOLUTION INTRAVENOUS at 10:49

## 2020-11-22 RX ADMIN — POTASSIUM & SODIUM PHOSPHATES POWDER PACK 280-160-250 MG 250 MG: 280-160-250 PACK at 13:08

## 2020-11-22 RX ADMIN — MEMANTINE HYDROCHLORIDE 10 MG: 10 TABLET, FILM COATED ORAL at 09:11

## 2020-11-22 RX ADMIN — Medication 10 ML: at 09:12

## 2020-11-22 RX ADMIN — ACETAMINOPHEN 650 MG: 325 TABLET ORAL at 09:12

## 2020-11-22 RX ADMIN — LEVETIRACETAM 750 MG: 100 SOLUTION ORAL at 13:08

## 2020-11-22 RX ADMIN — POTASSIUM & SODIUM PHOSPHATES POWDER PACK 280-160-250 MG 250 MG: 280-160-250 PACK at 17:17

## 2020-11-22 RX ADMIN — LEVETIRACETAM 750 MG: 15 INJECTION INTRAVENOUS at 01:45

## 2020-11-22 RX ADMIN — ACETAMINOPHEN 650 MG: 325 TABLET ORAL at 00:24

## 2020-11-22 RX ADMIN — LEVETIRACETAM 750 MG: 15 INJECTION INTRAVENOUS at 20:20

## 2020-11-22 NOTE — PROGRESS NOTES
Surgical  Neuro Science Intensive Care Unit  Critical Care  Daily Progress Note 11/22/2020    Date of Admission: 11/18/2020    CC: Follow up for subdural hematoma. HOSPITAL COURSE/OVERNIGHT EVENTS:    11/18   Admitted after seizures and found to have SDH. Underwent bethany hole crani  11/19   Drain removed today  11/20   Nothing new overnight  11/21   No new issues--seems more alert and animated. 11/22   No issuess overnight. Having frequent liquid BMs. PHYSICAL EXAM:  BP (!) 111/95   Pulse 50   Temp 96.8 °F (36 °C) (Temporal)   Resp 9   Ht 5' 4\" (1.626 m)   Wt 137 lb (62.1 kg)   SpO2 96%   BMI 23.52 kg/m²     Intake/Output Summary (Last 24 hours) at 11/22/2020 0728  Last data filed at 11/22/2020 0500  Gross per 24 hour   Intake 2551 ml   Output 2330 ml   Net 221 ml     General appearance:  Comfortable. Pain Description: none    NEUROLOGIC:   RASS Score:  + 1. GCS:  14.    4 - Opens eyes on own   6 - Follows simple motor commands  4 - Seems confused, disoriented   Unable to tell me where she is. Pupil size:  Left 4 mm  Right 4 mm  Pupil reaction: Yes   PERRLA  Wiggles fingers: Left   Yes  Right Yes  Hand grasp:   Left: Yes     Right    Yes  Wiggles toes: Left   Yes Right  Yes  Plantar flexion: Left Yes  Right   Yes  Facial droop:   no   Speech:  no    Crani incision:  CDI    CONSTITUTIONAL: No acute distress, lying in hospital bed. CARDIOVASCULAR: S1 S2, regular rate, regular rhythm, no murmur/gallop/rub. Monitor:SB,    PULMONARY: Bilaterally clear. No rhonchi/rales/wheezes, no use of accessory muscles. Room air. RENAL:  Voids. Fluid balance for previous 24 hours:  + 221 ml. ABDOMEN: Soft, nontender, nondistended, nontympanic, normal bowel sounds. Corpak. Tube feeds: Immune enhancing at 40 ml per hour. Having frequent liquid stools. No reported vomiting. MUSCULOSKELETAL:  Moves left side greater than right side.     SKIN/EXTREMITIES: No rashes/ecchymosis, no edema/clubbing, warm/dry, good capillary refill. LINES:  Peripheral     Recent Labs     11/20/20  0430 11/21/20  0400 11/22/20  0600   WBC 5.7 3.7* 3.6*   HGB 10.1* 9.5* 10.8*   HCT 31.2* 28.4* 31.5*   MCV 95.4 91.6 91.8   PLT 95* 94* 107*       Recent Labs     11/20/20  0430 11/21/20  0400    141   K 3.5 2.7*  2.7*   CO2 24 21*   PHOS 2.4* 1.7*   BUN 18 14   CREATININE 0.6 0.4*     ASSESSMENT/PLAN:     Active Problems:    Seizure (HCC)    Subdural hematoma ([facility] [address]    Head injury    Fall  Resolved Problems:    * No resolved hospital problems. *    Neuro: SDH S/P bur hole craniotomy. Fall. Seizure. Severe expressive aphasia. Severe encephalopathy. Hx of dementia & seizures. Monitor neuro status. Neurosurgery following. Neurology following. Kera for seizure prophylaxis. 3% NaCl infusion. Aricept. Soft cervical collar. Speech therapy. CV:  No acute issues. Monitor hemodynamics. Statin. Pulm: At risk for respiratory insufficiency. Monitor RR & SpO2. O2 as needed. Encourage cough, SMI  & deep breathing. GI:  Dysphagia. BMI 23. .    Monitor bowel function. NPO. Corpak. Tube feeds changed to semi elemental at 50 ml per hour. Phenergan. Bowel regime. Beach therapy for swallowing. Renal:  No acute issues. Monitor BUN & Cr, electrolytes & replace as needed. Monitor I & O.    Voids. ID: No acute issues  Endocrine: No acute issues. Monitor BS.    MSK: No acute issues. Deconditioned.   ROM. Turn & reposition. PT & OT pending recommendations. Monitor for skin breakdown. Heme: No acute issues. Monitor CBC. Bowel regime: PRN GlycoLax. Pain control/Sedation: Tylenol. Scheduled Tylenol. Ativan. Melatonin. DVT prophylaxis: SCD. No Lovenox/heparin due to recent [place]. GI prophylaxis: Tube feeding. Ancillary consults: Medicine. Neurosurgery. Neurology. Critical care.   Patient/Family update:  Contacted daughter Erin Rivera & updated her on her mother's condition. Code status: Full code. Disposition: Continue ICU.   Electronically signed by Tl Wilde RN MSN APRN-NP [facility] NP  CCNS CCRN 11/22/2020 9:25 AM

## 2020-11-22 NOTE — PROGRESS NOTES
Nutrition Note    Nutrition Note:    Tube Feeding recommendation per physician consult regarding patient having loose stools. Recommend Semi-Elemental (Vital AF 1.2) @50/hr to provide 1200ml, 1440 calories, 90g protein, 973ml water. This formula and rate meets 100% of patients calorie and protein needs. This formula is peptide-based to help promote GI tolerance. Estimated Daily Nutrient Needs:  Energy (kcal):  MSJ 4277 x1.2= 1381; ; Weight Used for Energy Requirements:  Current     Protein (g):  80-95(1.3-1.5gm/kg CBW);  Weight Used for Protein Requirements:  Current             Electronically signed by Janell Colmenares RD, ANGELA on 11/22/20 at 9:28 AM EST    Contact: [phone]

## 2020-11-22 NOTE — PROGRESS NOTES
[facility]  SURGICAL INTENSIVE CARE UNIT (SICU)  ATTENDING PHYSICIAN CRITICAL CARE PROGRESS NOTE     I have examined the patient, reviewed the record, and discussed the case with the APN/ resident. Please refer to the APN/ resident's note. I agree with the assessment and plan. I have reviewed all relevant labs and imaging data. The following summarizes my clinical findings and independent assessment.     CC:  Critical care management for [facility] Course/Overnight Events:  11/18--admitted after seizures and found to have SDH; underwent bethany hole crani  11/19--drain removed today  11/20--nothing new overnight  11/21--no new issues--seems more alert and animated  11/22--no new issues    Mumbling speech--much more clear today  Reportedly followed commands earlier  Eyes open  Hrt:  Regular  Lungs:  Fairly clear bilaterally  Abd:  Soft; BS active; NT/ND  Skin:  Warm/dry    Patient Active Problem List    Diagnosis Date Noted    Seizure ([facility] [address] 11/18/2020    Subdural hematoma ([facility] [address] 11/18/2020    Head injury     Fall        SDH--s/p bethany hole crani--monitor neuro exam  Seizure--on keppra  Hypoalbuminemia/moderate protein calorie malnutrition--cont TF  Electrolyte imbalance (hypomagnesemia)--correct as able  Acute blood loss anemia--monitor H/H  Thrombocytopenia--monitor  Monitor hemodynamics  Pain control  PT/OT evals  DVT risk--PCDs      Kee Byrne MD, FACS  11/22/2020  1:10 PM

## 2020-11-22 NOTE — PLAN OF CARE
Problem: Restraint Use - Nonviolent/Non-Self-Destructive Behavior:  Goal: Absence of restraint indications  Description: Absence of restraint indications  11/22/2020 1649 by Amrik Baird RN  Outcome: Not Met This Shift     Problem: Restraint Use - Nonviolent/Non-Self-Destructive Behavior:  Goal: Absence of restraint-related injury  Description: Absence of restraint-related injury  11/22/2020 1649 by Amrik Baird, RN  Outcome: Met This Shift

## 2020-11-22 NOTE — PROGRESS NOTES
Department of Neurosurgery  Progress Note    CHIEF COMPLAINT: s/p bethany hole craniotomy 11/18    SUBJECTIVE:  Denies headache. No new issues overnight. REVIEW OF SYSTEMS :  Unable to obtain.      OBJECTIVE:   VITALS:  BP (!) 148/85   Pulse 74   Temp 96.8 °F (36 °C) (Temporal)   Resp 18   Ht 5' 4\" (1.626 m)   Wt 137 lb (62.1 kg)   SpO2 99%   BMI 23.52 kg/m²     PHYSICAL:  Neurologic:  Awake and alert  Motor Exam:  Moving all extremities well  Sensory:  Sensory intact  Incision c/d/i      DATA:  CBC:   Lab Results   Component Value Date    WBC 3.6 11/22/2020    RBC 3.43 11/22/2020    HGB 10.8 11/22/2020    HCT 31.5 11/22/2020    MCV 91.8 11/22/2020    MCH 31.5 11/22/2020    MCHC 34.3 11/22/2020    RDW 12.8 11/22/2020     11/22/2020    MPV 11.5 11/22/2020     BMP:    Lab Results   Component Value Date     11/22/2020    K 4.1 11/22/2020    K 4.1 11/22/2020     11/22/2020    CO2 25 11/22/2020    BUN 13 11/22/2020    LABALBU 2.7 11/22/2020    LABALBU 4.4 05/04/2012    CREATININE 0.4 11/22/2020    CALCIUM 8.6 11/22/2020    GFRAA >60 11/22/2020    LABGLOM >60 11/22/2020    GLUCOSE 87 11/22/2020    GLUCOSE 105 05/04/2012     PT/INR:    Lab Results   Component Value Date    PROTIME 10.8 11/18/2020    INR 1.0 11/18/2020     PTT:    Lab Results   Component Value Date    APTT 34.8 11/18/2020   [APTT}    Current Inpatient Medications  Current Facility-Administered Medications: potassium & sodium phosphates (PHOS-NAK) 280-160-250 MG packet 250 mg, 1 packet, Per NG tube, 4x Daily  magnesium sulfate 4 g in 100 mL IVPB premix, 4 g, Intravenous, Once  levETIRAcetam (KEPPRA) 100 MG/ML solution 750 mg, 750 mg, Oral, BID **OR** levetiracetam (KEPPRA) 750 mg/50 mL IVPB, 750 mg, Intravenous, BID  LORazepam (ATIVAN) injection 0.5 mg, 0.5 mg, Intravenous, Q8H PRN  melatonin disintegrating tablet 5 mg, 5 mg, Oral, Nightly PRN  sodium chloride flush 0.9 % injection 10 mL, 10 mL, Intravenous, 2 times per day  polyethylene glycol (GLYCOLAX) packet 17 g, 17 g, Oral, Daily PRN  donepezil (ARICEPT) tablet 10 mg, 10 mg, Oral, Nightly  LORazepam (ATIVAN) tablet 1 mg, 1 mg, Oral, BID PRN  memantine (NAMENDA) tablet 10 mg, 10 mg, Oral, BID  acetaminophen (TYLENOL) tablet 650 mg, 650 mg, Oral, Q4H PRN  atorvastatin (LIPITOR) tablet 80 mg, 80 mg, Oral, Nightly  sodium chloride flush 0.9 % injection 10 mL, 10 mL, Intravenous, PRN  promethazine (PHENERGAN) tablet 12.5 mg, 12.5 mg, Oral, Q6H PRN **OR** ondansetron (ZOFRAN) injection 4 mg, 4 mg, Intravenous, Q6H PRN  0.9 % sodium chloride infusion, , Intravenous, Continuous    ASSESSMENT:   s/p bethany hole craniotomy 11/18    PLAN:  -Continue ICU care  -Serial neurological exams  -Pain control  -No anticoagulation/antiplatelet agents  -Neurology following  -Okay to transfer to [facility]    Electronically signed by Mike Diego PA-C on 11/22/2020 at 9:58 AM

## 2020-11-22 NOTE — PLAN OF CARE
Problem: Restraint Use - Nonviolent/Non-Self-Destructive Behavior:  Goal: Absence of restraint indications  Description: Absence of restraint indications  11/22/2020 1015 by Van Millard RN  Outcome: Not Met This Shift     Problem: Restraint Use - Nonviolent/Non-Self-Destructive Behavior:  Goal: Absence of restraint-related injury  Description: Absence of restraint-related injury  11/22/2020 1015 by Van Millard RN  Outcome: Met This Shift  11/22/2020 0743 by Rene Lyman RN  Outcome: Met This Shift

## 2020-11-22 NOTE — PROGRESS NOTES
Hospitalist Progress Note      PCP: No primary care provider on file. Date of Admission: 11/18/2020    Chief Complaint: none    Hospital Course:    72 y.o. female with PMH of seizure disorder, dementia who was originally taken to [facility] emergency department for worsening mental status. Patient was reported to have had a fall episode a week prior to presentation and had been having witnessed seizures. CT head at outside hospital revealed left-sided subacute subdural hematoma so patient was transferred to this facility for further evaluation and management by a neurosurgeon.     On arrival to the emergency department here, repeat CT head showed large subdural collection on the left which is acute or acute on chronic associated with 7 mm midline shift; also had acute left sphenoid sinusitis. She was evaluated by neurosurgeon, underwent frontotemporal bethany holes craniotomy with left subdural drain placement; did drain at night been discontinued. Subjective:     Medications:  Reviewed    Infusion Medications    sodium chloride 75 mL/hr at 11/21/20 1351     Scheduled Medications    sodium chloride flush  10 mL Intravenous 2 times per day    acetaminophen  650 mg Oral Q4H    donepezil  10 mg Oral Nightly    memantine  10 mg Oral BID    atorvastatin  80 mg Oral Nightly    levetiracetam  750 mg Intravenous Q12H     PRN Meds: LORazepam, melatonin, polyethylene glycol, LORazepam, acetaminophen, sodium chloride flush, promethazine **OR** ondansetron      Intake/Output Summary (Last 24 hours) at 11/22/2020 0914  Last data filed at 11/22/2020 0700  Gross per 24 hour   Intake 2811 ml   Output 2145 ml   Net 666 ml       Exam:    BP (!) 148/85   Pulse 74   Temp 96.8 °F (36 °C) (Temporal)   Resp 18   Ht 5' 4\" (1.626 m)   Wt 137 lb (62.1 kg)   SpO2 99%   BMI 23.52 kg/m²     General appearance: Lying comfortably in bed. No apparent distress.   HEENT: Lt frontotemporal craniotomy with stitches in place  Respiratory: Clear to auscultation bilaterally. Cardiovascular: Normal S1/S2. Regular rhythm and rate. Abdomen: Soft, non-tender, non-distended with normal bowel sounds. Musculoskeletal: No clubbing, cyanosis or edema bilaterally. Skin: Skin color, texture, turgor normal.  No rashes or lesions. Neurologic: Not following commands      Labs:   Recent Labs     11/20/20 0430 11/21/20  0400 11/22/20  0600   WBC 5.7 3.7* 3.6*   HGB 10.1* 9.5* 10.8*   HCT 31.2* 28.4* 31.5*   PLT 95* 94* 107*     Recent Labs     11/20/20 0430 11/21/20 0400 11/22/20  0600    141 143   K 3.5 2.7*  2.7* 4.1  4.1   * 116* 109*   CO2 24 21* 25   BUN 18 14 13   CREATININE 0.6 0.4* 0.4*   CALCIUM 9.5 6.5* 8.6   PHOS 2.4* 1.7* 2.9     Recent Labs     11/20/20 0430 11/21/20  0400 11/22/20  0600   AST 25 16 21   ALT 14 9 13   BILITOT 0.4 0.3 0.4   ALKPHOS 54 38 50     No results for input(s): INR in the last 72 hours. No results for input(s): Fredrick Beery in the last 72 hours. Radiology:  XR ABDOMEN (KUB) (SINGLE AP VIEW)   Final Result   Distal tip of a enteric tube is present in the right upper quadrant of the   abdomen, in the expected region of the distal stomach      CT HEAD WO CONTRAST   Final Result   Interval left frontal craniotomy with expected postoperative changes in the   soft tissues overlying the calvarium. Evolving left subdural hematoma, now measures 1.6 cm in thickness. Mild   interval improvement in left to right midline shift      CT Head WO Contrast   Final Result   1. Large subdural collection on the left is either acute or acute on chronic. There is 7 mm of midline shift. 2. Acute left sphenoid sinusitis. Critical results were called by Dr. Mena Mccauley MD to [facility]   on 11/18/2020 at 04:21. CT Cervical Spine WO Contrast   Final Result   No acute abnormality of the cervical spine.       XR PELVIS (1-2 VIEWS)   Final Result   No fracture or malalignment. XR CHEST PORTABLE   Final Result   1. No acute traumatic abnormality identified. 2. Right perihilar nodule. Nonemergent routine chest CT scan is recommended   for further evaluation. FL MODIFIED BARIUM SWALLOW W VIDEO    (Results Pending)             Active Hospital Problems    Diagnosis Date Noted    Seizure [facility]) [R56.9] 11/18/2020    Subdural hematoma ([address] [G07.2I4L] 11/18/2020    Head injury [S09.90XA]     Fall [W19. XXXA]        Assessment  Left-sided subdural hematoma with midline shift - underwent left frontotemporal bethany holes craniotomy with left subdural drain placement on 11/18. Drain was pulled on 11/19  Seizure - likely secondary to above  Acute metabolic encephalopathy   Suspected fall  Dementia  Hypokalemia      Plan:  Neurosurgery following  Neurology following  Critical care following  Keppra 750 mg twice daily  IV fluids  Monitor serum electrolytes, replete as needed      DVT Prophylaxis: SCDs  Diet: DIET TUBE FEED CONTINUOUS/CYCLIC NPO;  Immune Enhancing; Nasoenteric; Continuous; 20; 40; 24; Exceptions are: Sips with Meds  Code Status: Full Code    PT/OT Eval Status: pending    Dispo - TBD    Nicki Roman DO 11/22/2020 9:14 AM

## 2020-11-22 NOTE — PLAN OF CARE
Problem: Restraint Use - Nonviolent/Non-Self-Destructive Behavior:  Goal: Absence of restraint indications  Description: Absence of restraint indications  11/22/2020 0743 by Delroy Gutierrez RN  Outcome: Not Met This Shift     Problem: Restraint Use - Nonviolent/Non-Self-Destructive Behavior:  Goal: Absence of restraint-related injury  Description: Absence of restraint-related injury  11/22/2020 0743 by Delroy Gutierrez RN  Outcome: Met This Shift

## 2020-11-22 NOTE — FLOWSHEET NOTE
Patient pulled cor jing ngt  out. Patient confused and was in restraints. Bridle was still secure but tube was pulled out and laying on patient stomach.

## 2020-11-23 ENCOUNTER — APPOINTMENT (OUTPATIENT)
Dept: GENERAL RADIOLOGY | Age: 65
DRG: 025 | End: 2020-11-23
Payer: MEDICARE

## 2020-11-23 LAB
ANION GAP SERPL CALCULATED.3IONS-SCNC: 8 MMOL/L (ref 7–16)
BASOPHILS ABSOLUTE: 0.01 E9/L (ref 0–0.2)
BASOPHILS RELATIVE PERCENT: 0.2 % (ref 0–2)
BUN BLDV-MCNC: 8 MG/DL (ref 8–23)
CALCIUM SERPL-MCNC: 8.8 MG/DL (ref 8.6–10.2)
CHLORIDE BLD-SCNC: 107 MMOL/L (ref 98–107)
CO2: 25 MMOL/L (ref 22–29)
CREAT SERPL-MCNC: 0.4 MG/DL (ref 0.5–1)
EOSINOPHILS ABSOLUTE: 0.02 E9/L (ref 0.05–0.5)
EOSINOPHILS RELATIVE PERCENT: 0.5 % (ref 0–6)
GFR AFRICAN AMERICAN: >60
GFR NON-AFRICAN AMERICAN: >60 ML/MIN/1.73
GLUCOSE BLD-MCNC: 69 MG/DL (ref 74–99)
HCT VFR BLD CALC: 31.1 % (ref 34–48)
HEMOGLOBIN: 10.7 G/DL (ref 11.5–15.5)
IMMATURE GRANULOCYTES #: 0.03 E9/L
IMMATURE GRANULOCYTES %: 0.7 % (ref 0–5)
LYMPHOCYTES ABSOLUTE: 1.25 E9/L (ref 1.5–4)
LYMPHOCYTES RELATIVE PERCENT: 29.8 % (ref 20–42)
MAGNESIUM: 1.8 MG/DL (ref 1.6–2.6)
MCH RBC QN AUTO: 30.9 PG (ref 26–35)
MCHC RBC AUTO-ENTMCNC: 34.4 % (ref 32–34.5)
MCV RBC AUTO: 89.9 FL (ref 80–99.9)
MONOCYTES ABSOLUTE: 0.38 E9/L (ref 0.1–0.95)
MONOCYTES RELATIVE PERCENT: 9.1 % (ref 2–12)
NEUTROPHILS ABSOLUTE: 2.5 E9/L (ref 1.8–7.3)
NEUTROPHILS RELATIVE PERCENT: 59.7 % (ref 43–80)
PDW BLD-RTO: 12.6 FL (ref 11.5–15)
PHOSPHORUS: 2.7 MG/DL (ref 2.5–4.5)
PLATELET # BLD: 122 E9/L (ref 130–450)
PMV BLD AUTO: 11.6 FL (ref 7–12)
POTASSIUM SERPL-SCNC: 4 MMOL/L (ref 3.5–5)
RBC # BLD: 3.46 E12/L (ref 3.5–5.5)
SODIUM BLD-SCNC: 140 MMOL/L (ref 132–146)
WBC # BLD: 4.2 E9/L (ref 4.5–11.5)

## 2020-11-23 PROCEDURE — APPSS15 APP SPLIT SHARED TIME 0-15 MINUTES: Performed by: NURSE PRACTITIONER

## 2020-11-23 PROCEDURE — 84100 ASSAY OF PHOSPHORUS: CPT

## 2020-11-23 PROCEDURE — 97535 SELF CARE MNGMENT TRAINING: CPT

## 2020-11-23 PROCEDURE — 92507 TX SP LANG VOICE COMM INDIV: CPT

## 2020-11-23 PROCEDURE — 6360000002 HC RX W HCPCS: Performed by: FAMILY MEDICINE

## 2020-11-23 PROCEDURE — 83735 ASSAY OF MAGNESIUM: CPT

## 2020-11-23 PROCEDURE — 36415 COLL VENOUS BLD VENIPUNCTURE: CPT

## 2020-11-23 PROCEDURE — 97530 THERAPEUTIC ACTIVITIES: CPT

## 2020-11-23 PROCEDURE — 2580000003 HC RX 258: Performed by: STUDENT IN AN ORGANIZED HEALTH CARE EDUCATION/TRAINING PROGRAM

## 2020-11-23 PROCEDURE — 99232 SBSQ HOSP IP/OBS MODERATE 35: CPT | Performed by: NURSE PRACTITIONER

## 2020-11-23 PROCEDURE — 97162 PT EVAL MOD COMPLEX 30 MIN: CPT

## 2020-11-23 PROCEDURE — 97166 OT EVAL MOD COMPLEX 45 MIN: CPT

## 2020-11-23 PROCEDURE — 92526 ORAL FUNCTION THERAPY: CPT

## 2020-11-23 PROCEDURE — 6360000002 HC RX W HCPCS: Performed by: NURSE PRACTITIONER

## 2020-11-23 PROCEDURE — 74018 RADEX ABDOMEN 1 VIEW: CPT

## 2020-11-23 PROCEDURE — 85025 COMPLETE CBC W/AUTO DIFF WBC: CPT

## 2020-11-23 PROCEDURE — 1200000000 HC SEMI PRIVATE

## 2020-11-23 PROCEDURE — 80048 BASIC METABOLIC PNL TOTAL CA: CPT

## 2020-11-23 PROCEDURE — 2580000003 HC RX 258: Performed by: PHYSICIAN ASSISTANT

## 2020-11-23 PROCEDURE — 2709999900 HC NON-CHARGEABLE SUPPLY

## 2020-11-23 RX ORDER — HYDRALAZINE HYDROCHLORIDE 20 MG/ML
5 INJECTION INTRAMUSCULAR; INTRAVENOUS EVERY 6 HOURS PRN
Status: DISCONTINUED | OUTPATIENT
Start: 2020-11-23 | End: 2020-11-30 | Stop reason: HOSPADM

## 2020-11-23 RX ORDER — HYDRALAZINE HYDROCHLORIDE 20 MG/ML
5 INJECTION INTRAMUSCULAR; INTRAVENOUS EVERY 6 HOURS PRN
Status: DISCONTINUED | OUTPATIENT
Start: 2020-11-23 | End: 2020-11-23

## 2020-11-23 RX ADMIN — Medication 10 ML: at 08:00

## 2020-11-23 RX ADMIN — LEVETIRACETAM 750 MG: 15 INJECTION INTRAVENOUS at 21:35

## 2020-11-23 RX ADMIN — SODIUM CHLORIDE: 9 INJECTION, SOLUTION INTRAVENOUS at 14:22

## 2020-11-23 RX ADMIN — HYDRALAZINE HYDROCHLORIDE 5 MG: 20 INJECTION, SOLUTION INTRAMUSCULAR; INTRAVENOUS at 23:16

## 2020-11-23 RX ADMIN — LEVETIRACETAM 750 MG: 15 INJECTION INTRAVENOUS at 09:39

## 2020-11-23 NOTE — PROGRESS NOTES
17 g, 17 g, Per NG tube, Daily PRN  LORazepam (ATIVAN) injection 0.5 mg, 0.5 mg, Intravenous, Q8H PRN  melatonin disintegrating tablet 5 mg, 5 mg, Oral, Nightly PRN  sodium chloride flush 0.9 % injection 10 mL, 10 mL, Intravenous, 2 times per day  LORazepam (ATIVAN) tablet 1 mg, 1 mg, Oral, BID PRN  sodium chloride flush 0.9 % injection 10 mL, 10 mL, Intravenous, PRN  promethazine (PHENERGAN) tablet 12.5 mg, 12.5 mg, Oral, Q6H PRN **OR** ondansetron (ZOFRAN) injection 4 mg, 4 mg, Intravenous, Q6H PRN  0.9 % sodium chloride infusion, , Intravenous, Continuous    ASSESSMENT:   s/p bethany hole craniotomy 11/18    PLAN:  -Continue ICU care  -Serial neurological exams  -Pain control  -No anticoagulation/antiplatelet agents  -Neurology following  -Okay to transfer to [facility]    Electronically signed by Jose Malloy PA-C on 11/23/2020 at 10:06 AM

## 2020-11-23 NOTE — PROGRESS NOTES
Patient continues to pull at IVs, villanueva, cardiac leads. Unable to verbally redirect at this time. Bilateral soft wrist restraints continued for patient safety.

## 2020-11-23 NOTE — PROGRESS NOTES
Surgical  Neuro Science Intensive Care Unit  Critical Care  Daily Progress Note 11/23/2020    Date of Admission: 11/18/2020    CC: Follow up for subdural hematoma. HOSPITAL COURSE/OVERNIGHT EVENTS:    11/18   Admitted after seizures and found to have SDH. Underwent bethany hole crani  11/19   Drain removed today  11/20   Nothing new overnight  11/21   No new issues--seems more alert and animated. 11/22   No issuess overnight. Having frequent liquid BMs. Pulled small bore feeding tube out this am.      PHYSICAL EXAM:  /76   Pulse 60   Temp 97.1 °F (36.2 °C) (Temporal)   Resp 23   Ht 5' 4\" (1.626 m)   Wt 137 lb (62.1 kg)   SpO2 98%   BMI 23.52 kg/m²     Intake/Output Summary (Last 24 hours) at 11/23/2020 1207  Last data filed at 11/23/2020 1000  Gross per 24 hour   Intake 2000 ml   Output 1930 ml   Net 70 ml     General appearance:  Comfortable. Pain Description: none    NEUROLOGIC:   RASS Score:  + 1. GCS:  14.    4 - Opens eyes on own   6 - Follows simple motor commands  4 - Seems confused, disoriented   Unable to tell me where she is. Pupil size:  Left 4 mm  Right 4 mm  Pupil reaction: Yes   PERRLA  Wiggles fingers: Left   Yes  Right Yes  Hand grasp:   Left: Yes     Right    Yes  Wiggles toes: Left   Yes Right  Yes  Plantar flexion: Left Yes  Right   Yes  Facial droop:   no   Speech:  no    Crani incision:  CDI    CONSTITUTIONAL: No acute distress, lying in hospital bed. CARDIOVASCULAR: S1 S2, regular rate, regular rhythm, no murmur/gallop/rub. Monitor:SB,    PULMONARY: Bilaterally clear. No rhonchi/rales/wheezes, no use of accessory muscles. Room air. RENAL:  Voids. Fluid balance for previous 24 hours:  + 70 ml  . ABDOMEN: Soft, nontender, nondistended, nontympanic, normal bowel sounds. Having frequent liquid stools. No reported vomiting. MUSCULOSKELETAL:  Moves left side greater than right side.     SKIN/EXTREMITIES: No rashes/ecchymosis, no edema/clubbing, warm/dry, good capillary refill. LINES:  Peripheral     Recent Labs     11/21/20  0400 11/22/20  0600 11/23/20  0506   WBC 3.7* 3.6* 4.2*   HGB 9.5* 10.8* 10.7*   HCT 28.4* 31.5* 31.1*   MCV 91.6 91.8 89.9   PLT 94* 107* 122*       Recent Labs     11/21/20  0400 11/22/20  0600 11/23/20  0506    143 140   K 2.7*  2.7* 4.1  4.1 4.0   CO2 21* 25 25   PHOS 1.7* 2.9 2.7   BUN 14 13 8   CREATININE 0.4* 0.4* 0.4*     ASSESSMENT/PLAN:     Active Problems:    Seizure (HCC)    Subdural hematoma (HCC)    Head injury    Fall  Resolved Problems:    * No resolved hospital problems. *    Neuro: SDH S/P bur hole craniotomy. Fall. Seizure. Severe expressive aphasia. Severe encephalopathy. Hx of dementia & seizures. Monitor neuro status. Neurosurgery following. Neurology following. Keppra for seizure prophylaxis. Aricept. Soft cervical collar. Speech therapy. CV:  No acute issues. Monitor hemodynamics. Statin. Pulm: At risk for respiratory insufficiency. Monitor RR & SpO2. O2 as needed. Encourage cough, SMI  & deep breathing. GI:  Dysphagia. BMI 23. .    Monitor bowel function. NPO. Reinsert small bore feeding tube. Phenergan. Bowel regime. Beach therapy for swallowing. Renal:  No acute issues. Monitor BUN & Cr, electrolytes & replace as needed. Monitor I & O.    Voids. ID: No acute issues  Endocrine: No acute issues. Monitor BS.    MSK: No acute issues. Deconditioned.   ROM. Turn & reposition. PT & OT pending recommendations. Monitor for skin breakdown. Heme: No acute issues. Monitor CBC. Bowel regime: PRN GlycoLax. Pain control/Sedation: Tylenol. Ativan. Melatonin. DVT prophylaxis: SCD. No Lovenox/heparin due to recent Crani. GI prophylaxis: Tube feeding. Ancillary consults: Medicine. Neurosurgery. Neurology. Critical care. Patient/Family update:  Contacted daughter Eduardo Lord & updated her on her mother's condition.     Code status:

## 2020-11-23 NOTE — CARE COORDINATION
Followed up with pt's sister Sebastian Hernández re: transition of care plan. Plan at this time is for pt to return home with Sebastian Hernández. PTA ,pt ambulated independently. PT/OT evals pending. If unable to ambulate when ready for discharge, Sebastian Boykinhand will confer with pt's daughter to decide on a rehab facility. Informed Sebastian Betty of plan to transfer to stepdown and gave her pt's new room number.

## 2020-11-23 NOTE — PLAN OF CARE
Problem: Restraint Use - Nonviolent/Non-Self-Destructive Behavior:  Goal: Absence of restraint indications  Description: Absence of restraint indications  11/23/2020 0639 by Ysabel Dumont RN  Outcome: Not Met This Shift     Problem: Restraint Use - Nonviolent/Non-Self-Destructive Behavior:  Goal: Absence of restraint-related injury  Description: Absence of restraint-related injury  11/23/2020 0639 by Ysabel Dumont RN  Outcome: Met This Shift

## 2020-11-23 NOTE — PROGRESS NOTES
12 fr small bowel feeding tube inserted with [employer] tracking device. kub ordered. . bridal placed. Pt. Tolerated well. 65 cm exposed. . 75 cm indwelling

## 2020-11-23 NOTE — PROGRESS NOTES
4/5       Sensation: No c/o numbness or tingling in extremities; continue to assess  Tone: WNL   Edema: [facility]     Functional Assessment   Initial Eval Status  Date: 11/23 Treatment Status  Date: STG=LTG  5-14  days    Feeding NPO                        S; set up             When cleared for diet. Grooming Dep  Poor initiation and follow through despite [facility] cues to complete taks. Min A   while standing sink level requiring AD as needed for balance and demonstrating fair problem solving skills with min cues. UB dressing/bathing Max A  For gown change; due to poor processing. Min A       LB dressing/bathing Max A  Seated  (crossing LE's to ermias slippers)                        Min A   using AE as needed for safe reach/ energy conservation       Toileting Dep                        Min A     Bed Mobility  Supine to sit: Max A+2- max directional cues    Sit to supine: Max A                        Min A  in prep of ADL tasks & transfers   Functional Transfers Sit to stand: Min A    Stand to sit: Mod A                        SBA  sit<>stand/functional bathroom transfers using AD/DME as needed for balance and safety   Functional Mobility Mod A  Hand held                        SBA   functional/bathroom mobility using AD as needed & demonstrating F safety     Balance Sitting:     Static:  Min A    Dynamic:Min A  Standing: Mod A  S dynamic sitting balance; SBA dynamic standing balance  during ADL tasks & transfers   Endurance/Activity Tolerance   F tolerance with light activity.    G   tolerance with moderate activity/self care routine   Visual/  Perceptual Impaired: visual attention; body awareness; environmental awareness                         Vitals:   Heart Rate at rest 64 bpm Heart Rate post session 80 bpm   SpO2 at rest 99% SpO2 post session 98%   Blood Pressure at rest 140/87 mmHg Blood Pressure post session 163/87 mmHg       Assessment of current deficits [x]Functional mobility   [x]ADLs [x]Strength  [x]Cognition  [x]Functional transfers  [x]IADLs [x]Safety Awareness  [x]Endurance  [x]Fine Motor Coordination  [x]Balance      [x]Vision/perception  []Sensation    [x]Gross Motor Coordination  [x]ROM  [x]Delirium                  [x]Communication      Plan of Care: 1-3 days/week for 1-2 weeks PRN   [x]ADL retraining/adapted techniques and AE recommendations to increase functional independence within precautions                    [x]Energy conservation techniques to improve tolerance for selfcare routine   [x]Functional transfer/mobility training/DME recommendations for increased independence, safety and fall prevention         [x]Patient/family education to increase safety and functional independence             [x]Environmental modifications for safe mobility and completion of ADLs                             [x]Cognitive retraining ex's to improve problem solving skills & safe participation in ADLs/IADLs     []Sensory re-education techniques to improve extremity awareness, maintain skin integrity and improve hand function                             [x]Visual/Perceptual retraining  to improve body awareness and safety during transfers and ADLs  []Splinting/positioning needs to maintain joint/skin integrity and prevent contractures  [x]Therapeutic activity to improve functional performance during ADLs. [x]Therapeutic exercise to improve tolerance and functional strength for ADLs   [x]Balance retraining/tolerance tasks for facilitation of postural control with dynamic challenges during ADLs .   []Neuromuscular re-education: facilitation of righting/equilibrium reactions, midline orientation, scapular stability/mobility, Normalization muscle     tone and facilitation active functional movement/Attention                         [x]Delirium prevention/treatment    []Positioning to improve functional independence  []Other: Treatment: OT intervention provided to achieve goals:   Bed mobility: Instruction on precautions prior to bed mobility to facilitate safe transfers and ADLS. Pt required 2 person assist for safe mobility due to confusion and safety. Balance retraining: Performed sitting balance ex's to improve righting reactions with postural changes during ADLS. Pt transferred to bedside chair for increased support during LB ADLs. Cognition/Perception: Cognitive & perceptual retraining ex's throughout session to improve attention, body awareness and problem solving skills for participation in light ADLs. Delirium Prevention: Environmental and sensory modifications assessed and implemented to decrease ICU acquired delirium and to improve overall orientation, mentation and pt interaction with family/staff. Line management and environmental modifications made prior to and end of session to ensure patient safety and to increase efficiency of session. Skilled monitoring of HR, O2 saturation, blood pressure and patient's response to activity performed throughout session. Comments: OK from RN to see patient. Upon arrival, patient supine in bed; max re-direction for participation. Pt demo fair tolerance with poor understanding of education/techniques. At end of session, patient returned to bed for safety. Bed alarm activated. Call light within reach, all lines and tubes intact. Patient presents with decreased ROM/strength,activity tolerance, dynamic balance, functional mobility and cognitive deficits limiting completion of ADLs and safety. Pt can benefit from continued skilled OT to increase safety and functional independence. Evaluation Complexity: Moderate     · History: Expanded chart review of consults, imaging, and psychosocial history related to current functional performance.    · Exam: 5+ performance deficits identified limiting functional independence and safe return home · Assistance/Modification: Min/mod assistance or modifications required to perform tasks. May have comorbidities that affect occupational performance. Rehab Potential: Good for established goals    Patient / Family Goal: Pt cannot report    Patient and/or family were instructed/educated on diagnosis, prognosis/goals and plan of care. Pt demonstrated P understanding. [] Malnutrition indicators have been identified and nursing has been notified to ensure a dietitian consult is ordered. Time In:1315             Time Out: 1345         Total Treatment time: 25   Min Units   OT Eval Low 55878     OT Eval Medium 64546 X    OT Eval High 40652     OT Re-Eval [id number]     Therapeutic Ex 25520     Therapeutic Activities 89509 15 1   ADL/Self Care 72675 10 1   Orthotic Management 08997     Neuro Re-Ed 75897     Non-Billable Time        Evaluation time includes thorough review of current medical information, gathering information on past medical history/social history and prior level of function, completion of standardized testing/informal observation of tasks, assessment of data and development of POC/Goals.      Earlene Sloan, OTR/L [id number]

## 2020-11-23 NOTE — PROGRESS NOTES
Palliative Care Department  [phone]  Palliative Care Progress Note  Provider Olesya Oliva March APRN-CNP    Carlos Monroe  [id number]  Hospital Day: 6  Date of Initial Consult: 11/19/20  Referring Provider: Dr. Jelly Miller was consulted for assistance with: Goals of care, family support, and code status discussion. HPI:   Carlos Monroe is a 72 y.o. with a past medical history of seizure disorder and dementia who was admitted on 11/18/2020 from home with a CHIEF COMPLAINT of fall one week ago and seizures. She was transferred to [facility] from [facility] after being diagnosed with a subdural hematoma. CT of the head showed a large acute on chronic subdural collection on the left with 7 mm of a midline shift. She underwent a left frontal bethany hole craniotomy with subdural drain placement on 11/18/20 and was admitted to ICU. Patient failed nursing bedside swallow evaluation. Patient had Corpak placed on 11/20/20 and patient pulled it out last night. Palliative care consulted to discuss goals of care, family support, and code status discussion. ASSESSMENT/PLAN:     Pertinent Hospital Diagnoses      Subdural hematoma S/P bethany hole craniotomy: Neurosurgery and neurology following, Keppra; Failed bedside swallow study and Corpak was placed- patient pulled this out last night. For video swallow study today.  History of dementia: On Aricept and Namenda. Palliative Care Encounter / Counseling Regarding Goals of Care  Please see detailed goals of care discussion as below   At this time, Carlos Monroe, Does Not have capacity for medical decision-making. Capacity is time limited and situation/question specific   During encounter Janki was surrogate medical decision-maker   Outcome of goals of care meeting: Continue current care and await video swallow results. Janki to return call later after speaking with patient's sister, Lana Sky, on code status.     Code status Full Code   Advanced Directives: no POA or living will in [facility]   Surrogate/Legal NOK:  o Jimmy Rodriguez (daughter): [phone]  jesus Bland (sister): [phone]    Spiritual assessment: no spiritual distress identified  Bereavement and grief: to be determined  Referrals to: none today     SUBJECTIVE:   Details of Conversation:      11/23/20: Chart reviewed and patient seen at bedside with no family present. Patient is sleeping but arouses to verbal stimuli. She is more alert today. Her speech is nonsensical and she is not following commands. Spoke with bedside RN. Patient pulled out Corpak last night and she is for a video swallow today. She is also to transfer out of the ICU today. Call to patient's daughter, Marli Guzman, to discuss patient's care. Janki was unaware that patient pulled out her Corpak. Explained to her that depending upon how patient does during video swallow will decide the next conversation regarding the feeding tube. Asked Janki if she and her aunt had time to think about if they would want tube placed for long term feedings. She states they did not discuss this over the weekend and she will have to talk to her aunt/patient's sister, Deonte Amador, about this. She states that they did have a discussion about code status and they thought that patient never wanted to be placed on a ventilator. Further educated Janki on all the components of code status including chest compressions, defibrillation, resuscitative medications, and intubation. She states she needs to clarify with Clemons Marjorie one last time before making any changes to her code status. All questions answered at this time. Instructed Janki to call palliative care with any needs and when her and Clemons Pel make final decision regarding code status. Palliative care will continue to follow. 11/20/20: Chart reviewed and patient seen at bedside. Patient is lethargic and nonverbal. She is in no apparent distress.  She does respond to tactile stimuli but does not follow commands and pulls her hand away. Spoke with bedside RN and patient failed bedside swallow evaluation and is currently NPO. It is questionable if patient will be able to follow the cues to perform a formal swallow evaluation. Call to patient's daughter and Brissa Rodriguez. Introduced palliative care and our role. Discussed patient status and her condition at present. Janki explains that prior to this hospitalization she lived with her sister, Gildardo Hall, and was active and would walk around the house and was able to feed herself. She would speak and it would not make sense but they talked to her as they normally had in the past. The only trouble she had with ADLs was getting her shower. Her decline in condition is a big change per Janki. Discussed her code status and Janki became tearful. She states she needs to discuss this with Gildardo Hall before making any changes. Had lengthy discussion of what a FULL CODE is and all of the components. Janki would like to continue FULL CODE STATUS at this time. Also discussed tube feedings as patient did not pass her swallow evaluation. Janki is agreeable to temporary tube feedings at this time and would like to see how patient progresses over the next couple days. Did discuss long term feedings with a PEG tube. She did not want to discuss this as of yet and wants to talk to Gildardo Hall. Much emotional support provided via active listening and validation of feelings. Provided palliative care phone number for her to call with any questions or concerns. Palliative care will continue to follow.      Physical Function:  PPS: 10  PPS: 6 months ago: 60      OBJECTIVE:   Prognosis: depends upon goals and Guarded    Physical Exam:  /76   Pulse 60   Temp 97.1 °F (36.2 °C) (Temporal)   Resp 23   Ht 5' 4\" (1.626 m)   Wt 137 lb (62.1 kg)   SpO2 98%   BMI 23.52 kg/m²   Gen:  Elderly, thin, NAD, sleeping and arouses to verbal stimuli   HEENT:  Normocephalic, atraumatic, mucosa

## 2020-11-23 NOTE — PROGRESS NOTES
Physical Therapy  Physical Therapy Initial Assessment     Name: Mal Aguilar  : 1955  MRN: [id number]    Referring Provider:  Lonny Rodriguez DO      Date of Service: 2020    Evaluating PT:  Jaime Ball, PT, DPT [id number]    Room #:  3095/9503-Y  Diagnosis:  Seizure  Precautions: Falls, Seizures, aphasia, Dementia, Restraints   Procedure/Surgery:   Left frontal bur hole drainage of left frontotemporal subdural hematoma, Placement of left subdural drain  PMHx/PSHx:  Dementia  Equipment Needs:  TBD    SUBJECTIVE:    Pt lives with sister in a 2 story home with level entry. Bedroom on 2nd floor per chart. Unable to obtain. OBJECTIVE:   Initial Evaluation  Date: 20 Treatment Short Term/ Long Term   Goals   AM-PAC 6 Clicks      Was pt agreeable to Eval/treatment? Yes     Does pt have pain?  No signs of pain     Bed Mobility  Rolling: NT  Supine to sit: MaxA x 2 with HOB elevated  Sit to supine: NT  Scooting: MaxA  SBA   Transfers Sit to stand: Solitario  Stand to sit: Solitario  Stand pivot: ModA no device  SBA with AAD if needed   Ambulation   60 feet x 2 reps with ModA no device  >300 feet with SBA with AAD if needed   Stair negotiation: ascended and descended NT  >10 steps with 1 rail SBA   ROM BUE:  Defer to OT note  BLE:  WNL     Strength BUE:  Defer to OT note  BLE:  4-/5  Increase by 1/3 MMT grade   Balance Sitting EOB:  ModA dynamic  Dynamic Standing:  ModA no device  Sitting EOB:  Independent  Dynamic Standing:  SBA with AAD if needed     Pt is A & O x 0-1  CAM-ICU: NT  RASS: 0  Sensation:  Unable to assess  Edema:  None    Vitals:  Heart Rate at rest 64 bpm Heart Rate post session 80 bpm   SpO2 at rest 99% SpO2 post session 98%   Blood Pressure at rest 140/87 mmHg Blood Pressure post session 163/87 mmHg     Functional Status Score-Intensive Care Unit (FSS-ICU)   Rolling -/7   Supine to sit transfer 1/   Unsupported sitting  3/   Sit to stand transfers /   Ambulation  Total  10/35       Therapeutic Exercises:  NA    Patient education  Pt educated on safety    Patient response to education:   Pt verbalized understanding Pt demonstrated skill Pt requires further education in this area   x x x     ASSESSMENT:    Comments:  RN reported pt was stable for session. Pt was in bed upon arrival, agreeable to initial evaluation. Pt was minimally conversive but occasionally followed gross motor commands with repetition. Pt completed transfer to chair initially with assistance for processing and comprehension of task. Pt ambulated into hallway with decreased gait speed, narrow HEIDI and shuffled gait. Pt was left in bed with all needs met and call light in reach. All lines remained intact and restraints were reapplied. Assistance of two required due to acuity of medical condition, complex medical lines management as well as deconditioning. Treatment:  Patient practiced and was instructed in the following treatment:     Bed mobility training - pt given verbal and tactile cues to facilitate proper sequencing and safety during supine>sit as well as provided with physical assistance to complete task    Sitting EOB for >5 minutes for upright tolerance, postural awareness and BLE ROM   Transfer training - pt was given verbal and tactile cues to facilitate proper hand placement, technique and safety during sit to stand and stand to sit as well as provided with physical assistance to complete task.  Gait training- pt was given verbal and tactile cues to facilitate safety and balance during ambulation as well as provided with physical assistance to complete task. Pt's/ family goals   1. None stated     Patient and or family understand(s) diagnosis, prognosis, and plan of care.   Yes    PLAN OF CARE:    Current Treatment Recommendations     [x] Strengthening     [] ROM   [x] Balance Training   [x] Endurance Training   [x] Transfer Training   [x] Gait Training   [x] Stair Training [] Positioning   [x] Safety and Education Training   [x] Patient/Caregiver Education   [] HEP  [] Other     Frequency of treatments: 2-5x/week x 1-2 weeks. Time in  1305  Time out  1335    Total Treatment Time  25 minutes     Evaluation Time includes thorough review of current medical information, gathering information on past medical history/social history and prior level of function, completion of standardized testing/informal observation of tasks, assessment of data and education on plan of care and goals.     CPT codes:  [] Low Complexity PT evaluation 54955  [x] Moderate Complexity PT evaluation 54822  [] High Complexity PT evaluation 46486  [] PT Re-evaluation 72141  [] Gait training 90798 - minutes  [] Manual therapy 98519 - minutes  [x] Therapeutic activities 41243 25 minutes  [] Therapeutic exercises 58368 - minutes  [] Neuromuscular reeducation 77905 - minutes     Jacey Gutierrez, PT, DPT  [id number]

## 2020-11-23 NOTE — PROGRESS NOTES
Hospitalist Progress Note      PCP: No primary care provider on file. Date of Admission: 11/18/2020    Chief Complaint: none    Hospital Course:    72 y.o. female with PMH of seizure disorder, dementia who [place] [facility] [place] status. Patient was reported to have had a fall episode a week prior to presentation and had been having witnessed seizures. CT head at outside hospital revealed left-sided subacute subdural hematoma so patient was transferred to this facility for further evaluation and management by a neurosurgeon.     On arrival to the emergency department here, repeat CT head showed large subdural collection on the left which is acute or acute on chronic associated with 7 mm midline shift; also had acute left sphenoid sinusitis. She was evaluated by neurosurgeon, underwent frontotemporal bethany holes craniotomy with left subdural drain placement; did drain at night been discontinued.     Subjective:     Medications:  Reviewed    Infusion Medications    sodium chloride 75 mL/hr at 11/21/20 1351     Scheduled Medications    potassium & sodium phosphates  1 packet Per NG tube 4x Daily    atorvastatin  80 mg Per NG tube Nightly    donepezil  10 mg Per NG tube Nightly    levETIRAcetam  750 mg Per NG tube BID    Or    levETIRAcetam  750 mg Intravenous BID    memantine  10 mg Per NG tube BID    sodium chloride flush  10 mL Intravenous 2 times per day     PRN Meds: acetaminophen, polyethylene glycol, LORazepam, melatonin, LORazepam, sodium chloride flush, promethazine **OR** ondansetron      Intake/Output Summary (Last 24 hours) at 11/23/2020 0907  Last data filed at 11/23/2020 0800  Gross per 24 hour   Intake 2000 ml   Output 1945 ml   Net 55 ml       Exam:    BP (!) 155/80   Pulse 66   Temp 97.1 °F (36.2 °C) (Temporal)   Resp 16   Ht 5' 4\" (1.626 m)   Wt 137 lb (62.1 kg)   SpO2 97%   BMI 23.52 kg/m²     General appearance: Lying comfortably in bed. No apparent distress. HEENT: Lt frontotemporal craniotomy with stitches in place  Respiratory: Clear to auscultation bilaterally. Cardiovascular: Normal S1/S2. Regular rhythm and rate. Abdomen: Soft, non-tender, non-distended with normal bowel sounds. Musculoskeletal: No clubbing, cyanosis or edema bilaterally. Skin: Skin color, texture, turgor normal.  No rashes or lesions. Neurologic: Alert      Labs:   Recent Labs     11/21/20  0400 11/22/20  0600 11/23/20  0506   WBC 3.7* 3.6* 4.2*   HGB 9.5* 10.8* 10.7*   HCT 28.4* 31.5* 31.1*   PLT 94* 107* 122*     Recent Labs     11/21/20  0400 11/22/20  0600 11/23/20  0506    143 140   K 2.7*  2.7* 4.1  4.1 4.0   * 109* 107   CO2 21* 25 25   BUN 14 13 8   CREATININE 0.4* 0.4* 0.4*   CALCIUM 6.5* 8.6 8.8   PHOS 1.7* 2.9 2.7     Recent Labs     11/21/20  0400 11/22/20  0600   AST 16 21   ALT 9 13   BILITOT 0.3 0.4   ALKPHOS 38 50     No results for input(s): INR in the last 72 hours. No results for input(s): Melva Davin in the last 72 hours. Radiology:  XR ABDOMEN (KUB) (SINGLE AP VIEW)   Final Result   Distal tip of a enteric tube is present in the right upper quadrant of the   abdomen, in the expected region of the distal stomach      CT HEAD WO CONTRAST   Final Result   Interval left frontal craniotomy with expected postoperative changes in the   soft tissues overlying the calvarium. Evolving left subdural hematoma, now measures 1.6 cm in thickness. Mild   interval improvement in left to right midline shift      CT Head WO Contrast   Final Result   1. Large subdural collection on the left is either acute or acute on chronic. There is 7 mm of midline shift. 2. Acute left sphenoid sinusitis. Critical results were called by Dr. Lino Cho MD to [facility]   on 11/18/2020 at 04:21. CT Cervical Spine WO Contrast   Final Result   No acute abnormality of the cervical spine.       XR PELVIS (1-2 VIEWS)   Final Result   No fracture or malalignment. XR CHEST PORTABLE   Final Result   1. No acute traumatic abnormality identified. 2. Right perihilar nodule. Nonemergent routine chest CT scan is recommended   for further evaluation. FL MODIFIED BARIUM SWALLOW W VIDEO    (Results Pending)             Active Hospital Problems    Diagnosis Date Noted    Seizure [facility]) [R56.9] 11/18/2020    Subdural hematoma ([address] [O69.2W0N] 11/18/2020    Head injury [S09.90XA]     Fall [W19. XXXA]        Assessment  Left-sided subdural hematoma with midline shift - underwent left frontotemporal bethany holes craniotomy with left subdural drain placement on 11/18.   Drain was pulled on 11/19  Seizure - likely secondary to above  Acute metabolic encephalopathy   Suspected fall  Dementia  Hypokalemia      Plan:  Neurosurgery following  Neurology following  Critical care following  Keppra 750 mg twice daily  IV fluids  Monitor serum electrolytes, replete as needed  Transfer to medical floor      DVT Prophylaxis: SCDs  Diet: DIET TUBE FEED CONTINUOUS/CYCLIC NPO; Semi-elemental; Nasoenteric; Continuous; 20; 50; 24; Exceptions are: Sips with Meds  Code Status: Full Code    PT/OT Eval Status: pending    Dispo - TBD    Lisa Reed DO 11/23/2020 9:07 AM

## 2020-11-23 NOTE — FLOWSHEET NOTE
Pt continues to pull at villanueva catheter and IVs when unrestrained despite redirection efforts. Education provided with no change in behavior. Bilateral soft wrist restraints continued for patient safety.

## 2020-11-23 NOTE — PLAN OF CARE
Problem: Skin Integrity:  Goal: Will show no infection signs and symptoms  Outcome: Met This Shift     Problem: Falls - Risk of:  Goal: Will remain free from falls  Outcome: Met This Shift     Problem: Restraint Use - Nonviolent/Non-Self-Destructive Behavior:  Goal: Absence of restraint-related injury  11/23/2020 0716 by Bruce Herbert RN  Outcome: Met This Shift  11/23/2020 0639 by Og Ayers RN  Outcome: Met This Shift     Problem: Restraint Use - Nonviolent/Non-Self-Destructive Behavior:  Goal: Absence of restraint indications  11/23/2020 0716 by Bruce Herbert RN  Outcome: Not Met This Shift  11/23/2020 0639 by Og Ayers RN  Outcome: Not Met This Shift

## 2020-11-24 ENCOUNTER — APPOINTMENT (OUTPATIENT)
Dept: GENERAL RADIOLOGY | Age: 65
DRG: 025 | End: 2020-11-24
Payer: MEDICARE

## 2020-11-24 PROCEDURE — 6370000000 HC RX 637 (ALT 250 FOR IP): Performed by: NURSE PRACTITIONER

## 2020-11-24 PROCEDURE — 6360000002 HC RX W HCPCS: Performed by: NURSE PRACTITIONER

## 2020-11-24 PROCEDURE — 92611 MOTION FLUOROSCOPY/SWALLOW: CPT | Performed by: SPEECH-LANGUAGE PATHOLOGIST

## 2020-11-24 PROCEDURE — 2500000003 HC RX 250 WO HCPCS: Performed by: HOSPITALIST

## 2020-11-24 PROCEDURE — 97530 THERAPEUTIC ACTIVITIES: CPT

## 2020-11-24 PROCEDURE — 74230 X-RAY XM SWLNG FUNCJ C+: CPT

## 2020-11-24 PROCEDURE — 1200000000 HC SEMI PRIVATE

## 2020-11-24 PROCEDURE — 99232 SBSQ HOSP IP/OBS MODERATE 35: CPT | Performed by: NURSE PRACTITIONER

## 2020-11-24 RX ADMIN — LEVETIRACETAM 750 MG: 100 SOLUTION ORAL at 20:25

## 2020-11-24 RX ADMIN — POTASSIUM & SODIUM PHOSPHATES POWDER PACK 280-160-250 MG 250 MG: 280-160-250 PACK at 20:26

## 2020-11-24 RX ADMIN — BARIUM SULFATE 15 ML: 400 SUSPENSION ORAL at 14:15

## 2020-11-24 RX ADMIN — MEMANTINE HYDROCHLORIDE 10 MG: 10 TABLET, FILM COATED ORAL at 20:26

## 2020-11-24 RX ADMIN — ATORVASTATIN CALCIUM 80 MG: 80 TABLET, FILM COATED ORAL at 20:26

## 2020-11-24 RX ADMIN — LORAZEPAM 0.5 MG: 2 INJECTION INTRAMUSCULAR; INTRAVENOUS at 02:13

## 2020-11-24 RX ADMIN — LORAZEPAM 1 MG: 1 TABLET ORAL at 20:25

## 2020-11-24 RX ADMIN — BARIUM SULFATE 15 ML: 400 PASTE ORAL at 14:15

## 2020-11-24 RX ADMIN — DONEPEZIL HYDROCHLORIDE 10 MG: 5 TABLET, FILM COATED ORAL at 20:26

## 2020-11-24 ASSESSMENT — PAIN SCALES - GENERAL
PAINLEVEL_OUTOF10: 0
PAINLEVEL_OUTOF10: 0

## 2020-11-24 NOTE — PROGRESS NOTES
Physical Therapy  Facility/Department: [address] [facility]URO [facility]INE  Daily Treatment Note  NAME: Jasmin Nazario  : 1955  MRN: [id number]    Date of Service: 2020  Referring Provider:  Juan Stewart DO     Evaluating PT:  Reynaldo Lemus, PT, DPT [id number]     Room #:  6301-W  Diagnosis:  Seizure  Precautions: Falls, Seizures, aphasia, Dementia,  , TSM  Procedure/Surgery:   Left frontal bur hole drainage of left frontotemporal subdural hematoma, Placement of left subdural drain  PMHx/PSHx:  Dementia  Equipment Needs:  TBD     SUBJECTIVE:     Pt lives with sister in a 2 story home with level entry. Bedroom on 2nd floor per chart. Unable to obtain.     OBJECTIVE:    Initial Evaluation  Date: 20 Treatment  2020 Short Term/ Long Term   Goals   AM-PAC 6 Clicks   75/07     Was pt agreeable to Eval/treatment? Yes  Yes     Does pt have pain? No signs of pain  No     Bed Mobility  Rolling: NT  Supine to sit: MaxA x 2 with HOB elevated  Sit to supine: NT  Scooting: MaxA Rolling: NT  Supine to sit: ModA x2  Sit to supine: MaxA  Scooting: Solitario SBA   Transfers Sit to stand: Solitario  Stand to sit: Solitario  Stand pivot: ModA no device Sit to stand: Solitario  Stand to sit: ModA  Stand pivot: MaxA with no AD HHA SBA with AAD if needed   Ambulation   60 feet x 2 reps with ModA no device 10', 10' with no AD HHA MaxA >300 feet with SBA with AAD if needed   Stair negotiation: ascended and descended NT NT >10 steps with 1 rail SBA   ROM BUE:  Defer to OT note  BLE:  WNL       Strength BUE:  Defer to OT note  BLE:  4-/5   Increase by 1/3 MMT grade   Balance Sitting EOB:  ModA dynamic  Dynamic Standing:  ModA no device Sitting EOB:  Solitario  Dynamic Standing:  MaxA with no AD HHA Sitting EOB:  Independent  Dynamic Standing:  SBA with AAD if needed      Pt is A & O x (self, \"Hospital\"). Disoriented to time  and situation). Pt requiring max cues for facilitation of mobility. Pt following 50% of simple commands.  Pt with Cielo Herrera, SPT  Merlin Brenner PT, DPT  [id number]

## 2020-11-24 NOTE — PROGRESS NOTES
and she is not following commands. Spoke with bedside RN. Patient pulled out Corpak last night and she is for a video swallow today. She is also to transfer out of the ICU today. Call to patient's daughter, Marli Guzman, to discuss patient's care. Janki was unaware that patient pulled out her Corpak. Explained to her that depending upon how patient does during video swallow will decide the next conversation regarding the feeding tube. Asked Janki if she and her aunt had time to think about if they would want tube placed for long term feedings. She states they did not discuss this over the weekend and she will have to talk to her aunt/patient's sister, Deonte Amador, about this. She states that they did have a discussion about code status and they thought that patient never wanted to be placed on a ventilator. Further educated Janki on all the components of code status including chest compressions, defibrillation, resuscitative medications, and intubation. She states she needs to clarify with Deonte Amador one last time before making any changes to her code status. All questions answered at this time. Instructed Janki to call palliative care with any needs and when her and Deonte Amador make final decision regarding code status. Palliative care will continue to follow. 11/20/20: Chart reviewed and patient seen at bedside. Patient is lethargic and nonverbal. She is in no apparent distress. She does respond to tactile stimuli but does not follow commands and pulls her hand away. Spoke with bedside RN and patient failed bedside swallow evaluation and is currently NPO. It is questionable if patient will be able to follow the cues to perform a formal swallow evaluation. Call to patient's daughter and Marika Montserratole. Introduced palliative care and our role. Discussed patient status and her condition at present.  Janki explains that prior to this hospitalization she lived with her sister, Deonte Amador, and was active and would walk around the house and was able to feed herself. She would speak and it would not make sense but they talked to her as they normally had in the past. The only trouble she had with ADLs was getting her shower. Her decline in condition is a big change per Janki. Discussed her code status and Janki became tearful. She states she needs to discuss this with Paige Newman before making any changes. Had lengthy discussion of what a FULL CODE is and all of the components. Janki would like to continue FULL CODE STATUS at this time. Also discussed tube feedings as patient did not pass her swallow evaluation. Janki is agreeable to temporary tube feedings at this time and would like to see how patient progresses over the next couple days. Did discuss long term feedings with a PEG tube. She did not want to discuss this as of yet and wants to talk to Paige Newman. Much emotional support provided via active listening and validation of feelings. Provided palliative care phone number for her to call with any questions or concerns. Palliative care will continue to follow.      Physical Function:  PPS: 10  PPS: 6 months ago: 60      OBJECTIVE:   Prognosis: depends upon goals and Guarded    Physical Exam:  BP (!) 163/89   Pulse 62   Temp 96.6 °F (35.9 °C) (Temporal)   Resp 14   Ht 5' 4\" (1.626 m)   Wt 137 lb (62.1 kg)   SpO2 98%   BMI 23.52 kg/m²   Gen:  Elderly, thin, NAD, sleeping and arouses to verbal stimuli   HEENT:  Normocephalic, atraumatic, mucosa dry  Neck:  Supple, trachea midline  Lungs:  Respirations unlabored  Heart: RRR   Abd:  Soft, non tender  : Wan   Ext:  Moving all extremities, no edema, pulses present  Skin:  Cool and dry; Left subdural bethany hole site CHIKA  Neuro:  Opens eyes to verbal stimuli, nonsensical speech, answers yes/no occasionally, not following commands    Objective data reviewed: labs, images, records, medication use, vitals and chart    Discussed patient and the plan of care with the other IDT members: Palliative Medicine IDT Team, Floor Nurse and Family    Time/Communication  Greater than 50% of time spent, total 25 minutes in counseling and coordination of care at the bedside regarding goals of care. Thank you for allowing Palliative Medicine to participate in the care of Sherryle Blue.

## 2020-11-24 NOTE — PROGRESS NOTES
SPEECH/LANGUAGE PATHOLOGY  VIDEOFLUOROSCOPIC STUDY OF SWALLOWING (MBS)      PATIENT NAME:  Omayra Summers      :  1955          TODAY'S DATE:  2020 ROOM:  [facility]9730-      SUMMARY OF EVALUATION     DYSPHAGIA DIAGNOSIS: Mild pharyngeal dysphagia      DIET RECOMMENDATIONS:  Dysphagia 1, Pureed solids with  nectar consistency (mildly thick) liquids     FEEDING RECOMMENDATIONS:     Assistance level:  Full assistance is needed during all oral intake      Compensatory strategies recommended: Double swallow, Small bites/sips and No straw    THERAPY RECOMMENDATIONS:      Speech Pathology intervention is recommended 3-6 times per week for LOS or when goals are met with emphasis on the following:    Base of tongue/laryngeal elevation exercises including shaker/CTAR to strengthen muscles                PROCEDURE     Consistencies Administered During the Evaluation   Liquids: nectar thick liquid (thin liquid not presented secondary to severe swallow delay)  Solids:  pureed foods      Method of Intake:   cup, spoon  Fed by clinician      Position:   Seated, upright    Current Respiratory Status   room air                RESULTS     ORAL STAGE       The oral stage of swallowing was within functional limits        PHARYNGEAL STAGE           ONSET TIME     Delayed initiation of the pharyngeal swallow was noted with swallow reflex triggered at the level of the pyriform (majority of bolus was in phyriform sinus prior to swallow trigger)       PHARYNGEAL RESIDUALS          Vallecula/Pharyngeal Wall           Reduced tongue base retraction resulting in residuals in vallecula and/or posterior pharyngeal wall for nectar consistency liquid and pureed foods which mostly cleared with spontaneous double swallow      Pyriform Sinuses      No significant residuals were noted in the pyriform sinuses    LARYNGEAL PENETRATION     Laryngeal penetration was not present during this evaluation                 ASPIRATION    Aspiration was not present during this evaluation         COMPENSATORY STRATEGIES       Compensatory strategies were not attempted      STRUCTURAL/FUNCTIONAL ANOMALIES       No structural/functional anomalies were noted      CERVICAL ESOPHAGEAL STAGE :        The cervical esophagus appeared adequate                            Prognosis for improvements is good  This plan will be re-evaluated and revised in 1 week  if warranted. Patient stated goals: Agreed with above  Treatment goals discussed with Patient   The Patient understand(s) the diagnosis, prognosis and plan of care       CPT code:  84067  dysphagia study      [x]The admitting diagnosis and active problem list, as listed below have been reviewed prior to initiation of this evaluation.      ADMITTING DIAGNOSIS: Seizure ([address] [R56.9]  Seizure ([address] [R56.9]  Seizure ([address] [R56.9]     ACTIVE PROBLEM LIST:   Patient Active Problem List   Diagnosis    Seizure ([address]    Subdural hematoma ([address]    Head injury   [address] [facility], [address], Graduate Clinician

## 2020-11-24 NOTE — PROGRESS NOTES
2point soft wrist restraints released for patient care,patient remains confused and restless  , Pulling  at iv line and villanueva cath,restrtaints continued, will monitor patient.

## 2020-11-25 PROCEDURE — 6360000002 HC RX W HCPCS: Performed by: FAMILY MEDICINE

## 2020-11-25 PROCEDURE — 6370000000 HC RX 637 (ALT 250 FOR IP): Performed by: NURSE PRACTITIONER

## 2020-11-25 PROCEDURE — 1200000000 HC SEMI PRIVATE

## 2020-11-25 RX ORDER — LORAZEPAM 2 MG/ML
0.5 INJECTION INTRAMUSCULAR EVERY 8 HOURS PRN
Status: DISCONTINUED | OUTPATIENT
Start: 2020-11-25 | End: 2020-11-30 | Stop reason: HOSPADM

## 2020-11-25 RX ADMIN — LEVETIRACETAM 750 MG: 100 SOLUTION ORAL at 20:37

## 2020-11-25 RX ADMIN — HYDRALAZINE HYDROCHLORIDE 5 MG: 20 INJECTION, SOLUTION INTRAMUSCULAR; INTRAVENOUS at 00:54

## 2020-11-25 RX ADMIN — POTASSIUM & SODIUM PHOSPHATES POWDER PACK 280-160-250 MG 250 MG: 280-160-250 PACK at 20:37

## 2020-11-25 RX ADMIN — DONEPEZIL HYDROCHLORIDE 10 MG: 5 TABLET, FILM COATED ORAL at 20:37

## 2020-11-25 RX ADMIN — MEMANTINE HYDROCHLORIDE 10 MG: 10 TABLET, FILM COATED ORAL at 20:37

## 2020-11-25 RX ADMIN — LORAZEPAM 0.5 MG: 2 INJECTION INTRAMUSCULAR; INTRAVENOUS at 03:58

## 2020-11-25 RX ADMIN — ATORVASTATIN CALCIUM 80 MG: 80 TABLET, FILM COATED ORAL at 20:41

## 2020-11-25 RX ADMIN — LEVETIRACETAM 750 MG: 100 SOLUTION ORAL at 10:54

## 2020-11-25 RX ADMIN — Medication 5 MG: at 00:53

## 2020-11-25 RX ADMIN — MEMANTINE HYDROCHLORIDE 10 MG: 10 TABLET, FILM COATED ORAL at 10:55

## 2020-11-25 RX ADMIN — POTASSIUM & SODIUM PHOSPHATES POWDER PACK 280-160-250 MG 250 MG: 280-160-250 PACK at 14:28

## 2020-11-25 ASSESSMENT — PAIN SCALES - GENERAL: PAINLEVEL_OUTOF10: 0

## 2020-11-25 NOTE — PROGRESS NOTES
Outcomes:  Food and Nutrient Intake, Supplement Intake  Physical Signs/Symptoms Outcomes:  Chewing or Swallowing, Nutrition Focused Physical Findings, Weight, Skin, Biochemical Data, Fluid Status or Edema, GI Status, Hemodynamic Status     Discharge Planning:     Too soon to determine     Electronically signed by Sangita Bain RD, LD on 11/25/20 at 10:56 AM EST    Contact: [phone]

## 2020-11-25 NOTE — PROGRESS NOTES
Pt seen for swallowing exercises. Pt lethargic and unable to remain awake for participation in swallowing exercises. Continue with POC.      Sangeeta Whittaker, Graduate Clinician

## 2020-11-25 NOTE — CARE COORDINATION
Placed call to pt's sister to discuss transition of care. No answer. The pt requires speech therapy. Awaiting call back.  Tiffanie Roman RN

## 2020-11-25 NOTE — PROGRESS NOTES
Department of Neurosurgery  Progress Note    CHIEF COMPLAINT: s/p bethany hole craniotomy 11/18    SUBJECTIVE:  Resting. No new issues overnight. REVIEW OF SYSTEMS :  Unable to obtain.      OBJECTIVE:   VITALS:  BP (!) 101/55   Pulse 58   Temp 96.4 °F (35.8 °C) (Temporal)   Resp 16   Ht 5' 4\" (1.626 m)   Wt 137 lb (62.1 kg)   SpO2 98%   BMI 23.52 kg/m²     PHYSICAL:  Neurologic:  Awake and alert  Motor Exam:  Moving all extremities well  Sensory:  Sensory intact  Incision c/d/i      DATA:  CBC:   Lab Results   Component Value Date    WBC 4.2 11/23/2020    RBC 3.46 11/23/2020    HGB 10.7 11/23/2020    HCT 31.1 11/23/2020    MCV 89.9 11/23/2020    MCH 30.9 11/23/2020    MCHC 34.4 11/23/2020    RDW 12.6 11/23/2020     11/23/2020    MPV 11.6 11/23/2020     BMP:    Lab Results   Component Value Date     11/23/2020    K 4.0 11/23/2020    K 4.1 11/22/2020     11/23/2020    CO2 25 11/23/2020    BUN 8 11/23/2020    LABALBU 2.7 11/22/2020    LABALBU 4.4 05/04/2012    CREATININE 0.4 11/23/2020    CALCIUM 8.8 11/23/2020    GFRAA >60 11/23/2020    LABGLOM >60 11/23/2020    GLUCOSE 69 11/23/2020    GLUCOSE 105 05/04/2012     PT/INR:    Lab Results   Component Value Date    PROTIME 10.8 11/18/2020    INR 1.0 11/18/2020     PTT:    Lab Results   Component Value Date    APTT 34.8 11/18/2020   [APTT}    Current Inpatient Medications  Current Facility-Administered Medications: LORazepam (ATIVAN) injection 0.5 mg, 0.5 mg, Intramuscular, Q8H PRN  psyllium (KONSYL) 28.3 % packet 1 packet, 1 packet, Per NG tube, Daily  hydrALAZINE (APRESOLINE) injection 5 mg, 5 mg, Intravenous, Q6H PRN  potassium & sodium phosphates (PHOS-NAK) 280-160-250 MG packet 250 mg, 1 packet, Per NG tube, 4x Daily  atorvastatin (LIPITOR) tablet 80 mg, 80 mg, Per NG tube, Nightly  donepezil (ARICEPT) tablet 10 mg, 10 mg, Per NG tube, Nightly  levETIRAcetam (KEPPRA) 100 MG/ML solution 750 mg, 750 mg, Per NG tube, BID **OR** Levetiracetam (Keppra) 750mg/50ml, 750 mg, Intravenous, BID  memantine (NAMENDA) tablet 10 mg, 10 mg, Per NG tube, BID  acetaminophen (TYLENOL) tablet 650 mg, 650 mg, Per NG tube, Q4H PRN  polyethylene glycol (GLYCOLAX) packet 17 g, 17 g, Per NG tube, Daily PRN  LORazepam (ATIVAN) injection 0.5 mg, 0.5 mg, Intravenous, Q8H PRN  melatonin disintegrating tablet 5 mg, 5 mg, Oral, Nightly PRN  sodium chloride flush 0.9 % injection 10 mL, 10 mL, Intravenous, 2 times per day  LORazepam (ATIVAN) tablet 1 mg, 1 mg, Oral, BID PRN  sodium chloride flush 0.9 % injection 10 mL, 10 mL, Intravenous, PRN  promethazine (PHENERGAN) tablet 12.5 mg, 12.5 mg, Oral, Q6H PRN **OR** ondansetron (ZOFRAN) injection 4 mg, 4 mg, Intravenous, Q6H PRN  0.9 % sodium chloride infusion, , Intravenous, Continuous    ASSESSMENT:   s/p bethany hole craniotomy 11/18    PLAN:  -Serial neurological exams  -Pain control  -No anticoagulation/antiplatelet agents  -Neurology following  -Follow up in neurosurgery clinic in 2 weeks for staple removal and in 4 weeks with head CT scan  -Discharge planning    Electronically signed by Shefali Perez PA-C on 11/25/2020 at 10:00 AM

## 2020-11-26 PROCEDURE — 1200000000 HC SEMI PRIVATE

## 2020-11-26 PROCEDURE — 6370000000 HC RX 637 (ALT 250 FOR IP): Performed by: NURSE PRACTITIONER

## 2020-11-26 RX ADMIN — ATORVASTATIN CALCIUM 80 MG: 80 TABLET, FILM COATED ORAL at 21:04

## 2020-11-26 RX ADMIN — LEVETIRACETAM 750 MG: 100 SOLUTION ORAL at 21:05

## 2020-11-26 RX ADMIN — POTASSIUM & SODIUM PHOSPHATES POWDER PACK 280-160-250 MG 250 MG: 280-160-250 PACK at 09:54

## 2020-11-26 RX ADMIN — LEVETIRACETAM 750 MG: 100 SOLUTION ORAL at 09:55

## 2020-11-26 RX ADMIN — DONEPEZIL HYDROCHLORIDE 10 MG: 5 TABLET, FILM COATED ORAL at 21:04

## 2020-11-26 RX ADMIN — POTASSIUM & SODIUM PHOSPHATES POWDER PACK 280-160-250 MG 250 MG: 280-160-250 PACK at 13:37

## 2020-11-26 RX ADMIN — Medication 5 MG: at 21:05

## 2020-11-26 RX ADMIN — MEMANTINE HYDROCHLORIDE 10 MG: 10 TABLET, FILM COATED ORAL at 21:04

## 2020-11-26 RX ADMIN — POTASSIUM & SODIUM PHOSPHATES POWDER PACK 280-160-250 MG 250 MG: 280-160-250 PACK at 17:36

## 2020-11-26 RX ADMIN — LORAZEPAM 1 MG: 1 TABLET ORAL at 17:36

## 2020-11-26 RX ADMIN — POTASSIUM & SODIUM PHOSPHATES POWDER PACK 280-160-250 MG 250 MG: 280-160-250 PACK at 21:04

## 2020-11-26 RX ADMIN — MEMANTINE HYDROCHLORIDE 10 MG: 10 TABLET, FILM COATED ORAL at 09:53

## 2020-11-26 NOTE — PROGRESS NOTES
Department of Neurosurgery  Progress Note    CHIEF COMPLAINT: s/p bethany hole craniotomy 11/18    SUBJECTIVE:  Awake and alert. REVIEW OF SYSTEMS :  Unable to obtain.      OBJECTIVE:   VITALS:  BP (!) 155/60   Pulse 64   Temp 98 °F (36.7 °C) (Temporal)   Resp 16   Ht 5' 4\" (1.626 m)   Wt 137 lb (62.1 kg)   SpO2 93%   BMI 23.52 kg/m²     PHYSICAL:  Neurologic:  Awake and alert  Motor Exam:  Moving all extremities well  Sensory:  Sensory intact  Incision c/d/i      DATA:  CBC:   Lab Results   Component Value Date    WBC 4.2 11/23/2020    RBC 3.46 11/23/2020    HGB 10.7 11/23/2020    HCT 31.1 11/23/2020    MCV 89.9 11/23/2020    MCH 30.9 11/23/2020    MCHC 34.4 11/23/2020    RDW 12.6 11/23/2020     11/23/2020    MPV 11.6 11/23/2020     BMP:    Lab Results   Component Value Date     11/23/2020    K 4.0 11/23/2020    K 4.1 11/22/2020     11/23/2020    CO2 25 11/23/2020    BUN 8 11/23/2020    LABALBU 2.7 11/22/2020    LABALBU 4.4 05/04/2012    CREATININE 0.4 11/23/2020    CALCIUM 8.8 11/23/2020    GFRAA >60 11/23/2020    LABGLOM >60 11/23/2020    GLUCOSE 69 11/23/2020    GLUCOSE 105 05/04/2012     PT/INR:    Lab Results   Component Value Date    PROTIME 10.8 11/18/2020    INR 1.0 11/18/2020     PTT:    Lab Results   Component Value Date    APTT 34.8 11/18/2020   [APTT}    Current Inpatient Medications  Current Facility-Administered Medications: LORazepam (ATIVAN) injection 0.5 mg, 0.5 mg, Intramuscular, Q8H PRN  psyllium (KONSYL) 28.3 % packet 1 packet, 1 packet, Per NG tube, Daily  hydrALAZINE (APRESOLINE) injection 5 mg, 5 mg, Intravenous, Q6H PRN  potassium & sodium phosphates (PHOS-NAK) 280-160-250 MG packet 250 mg, 1 packet, Per NG tube, 4x Daily  atorvastatin (LIPITOR) tablet 80 mg, 80 mg, Per NG tube, Nightly  donepezil (ARICEPT) tablet 10 mg, 10 mg, Per NG tube, Nightly  levETIRAcetam (KEPPRA) 100 MG/ML solution 750 mg, 750 mg, Per NG tube, BID **OR** Levetiracetam (Keppra) 750mg/50ml, 750 mg, Intravenous, BID  memantine (NAMENDA) tablet 10 mg, 10 mg, Per NG tube, BID  acetaminophen (TYLENOL) tablet 650 mg, 650 mg, Per NG tube, Q4H PRN  polyethylene glycol (GLYCOLAX) packet 17 g, 17 g, Per NG tube, Daily PRN  LORazepam (ATIVAN) injection 0.5 mg, 0.5 mg, Intravenous, Q8H PRN  melatonin disintegrating tablet 5 mg, 5 mg, Oral, Nightly PRN  sodium chloride flush 0.9 % injection 10 mL, 10 mL, Intravenous, 2 times per day  LORazepam (ATIVAN) tablet 1 mg, 1 mg, Oral, BID PRN  sodium chloride flush 0.9 % injection 10 mL, 10 mL, Intravenous, PRN  promethazine (PHENERGAN) tablet 12.5 mg, 12.5 mg, Oral, Q6H PRN **OR** ondansetron (ZOFRAN) injection 4 mg, 4 mg, Intravenous, Q6H PRN  0.9 % sodium chloride infusion, , Intravenous, Continuous    ASSESSMENT:   s/p betahny hole craniotomy 11/18    PLAN:  -Serial neurological exams  -Pain control  -No anticoagulation/antiplatelet agents  -Neurology following  -Follow up in neurosurgery clinic in 2 weeks for staple removal and in 4 weeks with head CT scan  -Discharge planning    Electronically signed by Miguelina Bustamante PA-C on 11/26/2020 at 9:18 AM

## 2020-11-27 PROCEDURE — 6370000000 HC RX 637 (ALT 250 FOR IP): Performed by: NURSE PRACTITIONER

## 2020-11-27 PROCEDURE — 97530 THERAPEUTIC ACTIVITIES: CPT

## 2020-11-27 PROCEDURE — 92526 ORAL FUNCTION THERAPY: CPT

## 2020-11-27 PROCEDURE — 97535 SELF CARE MNGMENT TRAINING: CPT

## 2020-11-27 PROCEDURE — 1200000000 HC SEMI PRIVATE

## 2020-11-27 RX ADMIN — POTASSIUM & SODIUM PHOSPHATES POWDER PACK 280-160-250 MG 250 MG: 280-160-250 PACK at 12:31

## 2020-11-27 RX ADMIN — POTASSIUM & SODIUM PHOSPHATES POWDER PACK 280-160-250 MG 250 MG: 280-160-250 PACK at 08:42

## 2020-11-27 RX ADMIN — POTASSIUM & SODIUM PHOSPHATES POWDER PACK 280-160-250 MG 250 MG: 280-160-250 PACK at 16:53

## 2020-11-27 RX ADMIN — LEVETIRACETAM 750 MG: 100 SOLUTION ORAL at 08:42

## 2020-11-27 RX ADMIN — LORAZEPAM 1 MG: 1 TABLET ORAL at 18:18

## 2020-11-27 RX ADMIN — MEMANTINE HYDROCHLORIDE 10 MG: 10 TABLET, FILM COATED ORAL at 08:42

## 2020-11-27 NOTE — PLAN OF CARE
Problem: Falls - Risk of:  Goal: Will remain free from falls  Description: Will remain free from falls  11/27/2020 1015 by Zoey Wallace RN  Outcome: Met This Shift  11/26/2020 2233 by Nelly Murray RN  Outcome: Ongoing     Problem: Falls - Risk of:  Goal: Absence of physical injury  Description: Absence of physical injury  11/27/2020 1015 by Zoey Wallace RN  Outcome: Met This Shift  11/26/2020 2233 by Nelly Murray RN  Outcome: Ongoing

## 2020-11-27 NOTE — CARE COORDINATION
11/27 Update CM Note: Call placed to patients sister, Sanjuanita Jacobo, who patient resides with. Per Sanjuanita Jacobo the discharge decisions need to be discussed with patients daughter, Delilah Galss. Call placed and message left for Janki to return call.  Lilia Stiles RN CM

## 2020-11-27 NOTE — CARE COORDINATION
11/27 Update CM Note; Received a call from patients daughter, Susan Acevedo, who states the only referral for rehab for her mother is [facility] in [place]. Call placed and per DON they cannot take the referral until Monday as they are not eqiupped with admissions staff to do a new admission currently. Per the daughter she is not interested in any other referrals at this time. Will follow.   Leigha Urrutia RN CM

## 2020-11-27 NOTE — PROGRESS NOTES
Physical Therapy    NAME: Rafaela Moya  : 1955  MRN: [id number]     Date of Service: 2020  Referring Provider:  Papo Sauceda DO     Evaluating PT: Madai Hill PT, DPT [id number]     Room #:  6422-H  Diagnosis:  Seizure  Precautions: Falls, Seizures, aphasia, Dementia,  , TSM  Procedure/Surgery:   Left frontal bur hole drainage of left frontotemporal subdural hematoma, Placement of left subdural drain  PMHx/PSHx:  Dementia  Equipment Needs:  TBD     SUBJECTIVE:     Pt lives with sister in a 2 story home with level entry.  Bedroom on 2nd floor per chart.    Unable to obtain.     OBJECTIVE:    Initial Evaluation  Date: 20 Treatment  2020 Short Term/ Long Term   Goals   AM-PAC 6 Clicks 88/58  81/49     Was pt agreeable to Eval/treatment? Yes  Yes     Does pt have pain? No signs of pain  No     Bed Mobility  Rolling: NT  Supine to sit: MaxA x 2 with HOB elevated  Sit to supine: NT  Scooting: MaxA Rolling: Min  Supine to sit: Min A  Sit to supine: Min  Scooting: Solitario SBA   Transfers Sit to stand: Solitario  Stand to sit: Solitario  Stand pivot: ModA no device Sit to stand: Solitario  Stand to sit: Solitario  Stand pivot: Min A with no AD HHA cues to widen base of support. SBA with AAD if needed   Ambulation   60 feet x 2 reps with ModA no device 30' with no AD HHA Solitario very narrow base of support. Increased fall risk. >300 feet with SBA with AAD if needed   Stair negotiation: ascended and descended NT NT >10 steps with 1 rail SBA   ROM BUE:  Defer to OT note  BLE:  WNL       Strength BUE:  Defer to OT note  BLE:  4-/5   Increase by 1/3 MMT grade   Balance Sitting EOB:  ModA dynamic  Dynamic Standing:  ModA no device Sitting EOB:  Solitario  Dynamic Standing:  MaxA with no AD HHA Sitting EOB:  Independent  Dynamic Standing:  SBA with AAD if needed      Pt is A & O x (self, \"Hospital\"). Disoriented to time  and situation). Pt requiring max cues for facilitation of mobility.  Pt following 50% of simple commands. Pt remains with flat affect. Very little interaction. Sensation:  Unable to assess due to cognition  Edema:  None     Patient education  Pt educated on role of PT and proper positioning and sequencing during bed mobility, functional transfers, and ambulation.     Patient response to education:   Pt verbalized understanding Pt demonstrated skill Pt requires further education in this area   X X X      ASSESSMENT:     Comments:  Pt was received in supine agreeable to PT treatment. Exhibited flat affect. Demonstrated fair command follow throughout session, requiring frequent verbal cues to redirect attention. Required increased time to perform supine to sit. Required increased time during functional transfers. Ambulated with reciprocal pattern, short step height and length, decreased speed, and unsteadiness due to weakness, narrow base of support. Activity limited by fair command follow. Pt was left in supine with call light in reach. Bed alarm activated for safety.      Treatment:  Patient practiced and was instructed in the following treatment:    · Bed mobility- verbal cues to promote functional independence  · Functional transfers- verbal cues to facilitate proper positioning and sequencing  · Gait training- verbal cues to facilitate proper positioning     PLAN:        PLAN OF CARE:     Current Treatment Recommendations      [x]? Strengthening     [x]? ROM   [x]? Balance Training   [x]? Endurance Training   [x]? Transfer Training   [x]? Gait Training   [x]? Stair Training   [x]? Positioning   [x]? Safety and Education Training   [x]? Patient/Caregiver Education   [x]? HEP  []? Other         Patient is making good progress towards established goals. Will continue with current POC.       Time in  1300  Time out  1325     Total Treatment Time  25 minutes     CPT codes:  []? Gait training 91928 0 minutes  []? Manual therapy 87243 0 minutes  [x]? Therapeutic activities 18378 25 minutes  []?  Therapeutic exercises 29491 0 minutes  []?  Neuromuscular reeducation [id number] 0 minutes     Adri Taylor, [id number] [facility]

## 2020-11-27 NOTE — PROGRESS NOTES
Occupational Therapy  OT BEDSIDE TREATMENT NOTE      Date:2020  Patient Name: Tressa Dubose  MRN: [id number]  : 1955  Room: [address] [place] [address]-A     Referring Provider: Lori Brannon DO     Evaluating OT: Stefany Portillo OTR/L [id number]     AM-PAC Daily Activity Raw Score:      Recommended Adaptive Equipment: TBA         Diagnosis: Seizure; L SDH     Surgery/Procedures:  Left frontal bur hole drainage of left frontotemporal subdural hematoma, Placement of left subdural drain      Pertinent Medical History: Dementia, R SDH, Seizures     Precautions:  Falls & bed alarm, Seizures, aphasia, Dementia, Restraints, NPO     Home Living: Per chart, Pt lives with sister  in a 2 story home with no step(s) to enter and no rail(s); bed/bath on 2nd floor. Bathroom setup: tub/shower  Equipment owned: ww     Prior Level of Function: Pt is a poor historian; unable to verbalize responses. Pain Level:no indication of pain     Cognition: A&O: 1/4  (responds to name, unable to state her name)  Follows occasional gross1 step command ~ 50% of the time. Pt presents with poor awareness, poor attention, poor processing. Requires constant redirection throughout session. Pt demo difficulty following task despite hand over hand cues. Memory: poor              Comprehension poor              Problem solving: poor              Judgement/safety: poor                 Communication skills: poor; occasional simple responses              Vision: impaired attention; impaired tracking; impaired eye contact- flat affect                     Glasses:pt not able to report if she wears glasses                                                        Hearing: [place] vs inattention                    Functional Assessment    Initial Eval Status  Date:  Treatment Status  Date:  STG=LTG  5-14  days    Feeding NPO  N/T                       S; set up             When cleared for diet.    Grooming Dep  Poor initiation and follow through despite [facility] cues to complete taks.  Max A  To wash face seated EOB                       Min A   while standing sink level requiring AD as needed for balance and demonstrating fair problem solving skills with min cues.        UB dressing/bathing Max A  For gown change; due to poor processing.    Max A  To don/doff gown seated EOB                        Min A         LB dressing/bathing Max A  Seated  (crossing LE's to ermias slippers) Max A  To don/doff slippers seated EOB                        Min A   using AE as needed for safe reach/ energy conservation        Toileting Dep Dependent  For hygiene following incontinence of bowel                        Min A      Bed Mobility  Supine to sit: Max A+2- max directional cues     Sit to supine: Max A Min A- supine<->sit  Cuing for safety                        Min A  in prep of ADL tasks & transfers   Functional Transfers Sit to stand: Min A     Stand to sit: Mod A  Min A- sit<->stand  Cuing for hand placement and body mechanics                       SBA  sit<>stand/functional bathroom transfers using AD/DME as needed for balance and safety   Functional Mobility Mod A  Hand held  Min A  Short distance no AD                       SBA   functional/bathroom mobility using AD as needed & demonstrating F safety      Balance Sitting:     Static:  Min A    Dynamic:Min A  Standing: Mod A Sitting:     Static:  SBA    Dynamic:Min A  Standing: Min A  S dynamic sitting balance; SBA dynamic standing balance  during ADL tasks & transfers   Endurance/Activity Tolerance    F tolerance with light activity. Fair  G   tolerance with moderate activity/self care routine   Visual/  Perceptual Impaired: visual attention; body awareness; environmental awareness                                    Comments: Upon arrival pt supine in bed. Pt educated on techniques to increase independence and safety during ADL's, bed mobility, and functional transfers.  At end of session pt left seated upright in bed, call light within reach. · Pt has made fair progress towards set goals.      · Continue with current plan of care    Treatment Time In: 1:00            Treatment Time Out: 1:28             Treatment Charges: Mins Units   Ther Ex  13140     Manual Therapy [id number]     Thera Activities [id number] 15 1   ADL/Home Mgt [id number] 13 1   Neuro Re-ed 16026     Group Therapy      Orthotic manage/training  [id number]     Non-Billable Time     Total Timed Treatment 28 [id number] [place] [id number]

## 2020-11-27 NOTE — PROGRESS NOTES
Department of Neurosurgery  Progress Note    CHIEF COMPLAINT: s/p bethany hole craniotomy 11/18    SUBJECTIVE:  Asleep, aroused by voice. No new issues overnight. REVIEW OF SYSTEMS :  Unable to obtain.      OBJECTIVE:   VITALS:  /67   Pulse 55   Temp 97 °F (36.1 °C) (Temporal)   Resp 18   Ht 5' 4\" (1.626 m)   Wt 137 lb (62.1 kg)   SpO2 99%   BMI 23.52 kg/m²     PHYSICAL:  Neurologic:  Awake and alert  Motor Exam:  Moving all extremities well  Sensory:  Sensory intact  Incision c/d/i      DATA:  CBC:   Lab Results   Component Value Date    WBC 4.2 11/23/2020    RBC 3.46 11/23/2020    HGB 10.7 11/23/2020    HCT 31.1 11/23/2020    MCV 89.9 11/23/2020    MCH 30.9 11/23/2020    MCHC 34.4 11/23/2020    RDW 12.6 11/23/2020     11/23/2020    MPV 11.6 11/23/2020     BMP:    Lab Results   Component Value Date     11/23/2020    K 4.0 11/23/2020    K 4.1 11/22/2020     11/23/2020    CO2 25 11/23/2020    BUN 8 11/23/2020    LABALBU 2.7 11/22/2020    LABALBU 4.4 05/04/2012    CREATININE 0.4 11/23/2020    CALCIUM 8.8 11/23/2020    GFRAA >60 11/23/2020    LABGLOM >60 11/23/2020    GLUCOSE 69 11/23/2020    GLUCOSE 105 05/04/2012     PT/INR:    Lab Results   Component Value Date    PROTIME 10.8 11/18/2020    INR 1.0 11/18/2020     PTT:    Lab Results   Component Value Date    APTT 34.8 11/18/2020   [APTT}    Current Inpatient Medications  Current Facility-Administered Medications: LORazepam (ATIVAN) injection 0.5 mg, 0.5 mg, Intramuscular, Q8H PRN  psyllium (KONSYL) 28.3 % packet 1 packet, 1 packet, Per NG tube, Daily  hydrALAZINE (APRESOLINE) injection 5 mg, 5 mg, Intravenous, Q6H PRN  potassium & sodium phosphates (PHOS-NAK) 280-160-250 MG packet 250 mg, 1 packet, Per NG tube, 4x Daily  atorvastatin (LIPITOR) tablet 80 mg, 80 mg, Per NG tube, Nightly  donepezil (ARICEPT) tablet 10 mg, 10 mg, Per NG tube, Nightly  levETIRAcetam (KEPPRA) 100 MG/ML solution 750 mg, 750 mg, Per NG tube, BID **OR** Levetiracetam (Keppra) 750mg/50ml, 750 mg, Intravenous, BID  memantine (NAMENDA) tablet 10 mg, 10 mg, Per NG tube, BID  acetaminophen (TYLENOL) tablet 650 mg, 650 mg, Per NG tube, Q4H PRN  polyethylene glycol (GLYCOLAX) packet 17 g, 17 g, Per NG tube, Daily PRN  LORazepam (ATIVAN) injection 0.5 mg, 0.5 mg, Intravenous, Q8H PRN  melatonin disintegrating tablet 5 mg, 5 mg, Oral, Nightly PRN  sodium chloride flush 0.9 % injection 10 mL, 10 mL, Intravenous, 2 times per day  LORazepam (ATIVAN) tablet 1 mg, 1 mg, Oral, BID PRN  sodium chloride flush 0.9 % injection 10 mL, 10 mL, Intravenous, PRN  promethazine (PHENERGAN) tablet 12.5 mg, 12.5 mg, Oral, Q6H PRN **OR** ondansetron (ZOFRAN) injection 4 mg, 4 mg, Intravenous, Q6H PRN  0.9 % sodium chloride infusion, , Intravenous, Continuous    ASSESSMENT:   s/p bethany hole craniotomy 11/18    PLAN:  -Serial neurological exams  -Pain control  -No anticoagulation/antiplatelet agents  -Neurology following  -Follow up in neurosurgery clinic in 2 weeks for staple removal (12/2/20)and in 4 weeks with head CT scan  -Discharge planning    Electronically signed by Marc Ellison PA-C on 11/27/2020 at 10:57 AM

## 2020-11-27 NOTE — CARE COORDINATION
11/27 Update CM Note: Call received from patients daughter Janki who requested a [facility] list [place] be emailed to her. Pulled [place] [facility] list and email sent for her to review other options.   Lady Delmi AQUINO Cm

## 2020-11-27 NOTE — CARE COORDINATION
11/27 Update CM Note: Call placed to caridad Shearer, who states she has not been updated on her mother and she is not going to make any decisions regarding decision until she speaks with her family. I did inform her that her mother has a discharge order on the chart. She states she will make a decision regarding rehab/home after speaking with her family.  Rio Ellison RN CM

## 2020-11-28 PROBLEM — R13.10 DYSPHAGIA: Status: ACTIVE | Noted: 2020-11-28

## 2020-11-28 PROBLEM — F03.90 DEMENTIA (HCC): Status: ACTIVE | Noted: 2020-11-28

## 2020-11-28 LAB
ALBUMIN SERPL-MCNC: 2.9 G/DL (ref 3.5–5.2)
ALP BLD-CCNC: 60 U/L (ref 35–104)
ALT SERPL-CCNC: 21 U/L (ref 0–32)
ANION GAP SERPL CALCULATED.3IONS-SCNC: 10 MMOL/L (ref 7–16)
AST SERPL-CCNC: 25 U/L (ref 0–31)
BASOPHILS ABSOLUTE: 0.01 E9/L (ref 0–0.2)
BASOPHILS RELATIVE PERCENT: 0.3 % (ref 0–2)
BILIRUB SERPL-MCNC: 0.5 MG/DL (ref 0–1.2)
BUN BLDV-MCNC: 6 MG/DL (ref 8–23)
CALCIUM SERPL-MCNC: 9.1 MG/DL (ref 8.6–10.2)
CHLORIDE BLD-SCNC: 107 MMOL/L (ref 98–107)
CO2: 25 MMOL/L (ref 22–29)
CREAT SERPL-MCNC: 0.5 MG/DL (ref 0.5–1)
EOSINOPHILS ABSOLUTE: 0.06 E9/L (ref 0.05–0.5)
EOSINOPHILS RELATIVE PERCENT: 1.6 % (ref 0–6)
GFR AFRICAN AMERICAN: >60
GFR NON-AFRICAN AMERICAN: >60 ML/MIN/1.73
GLUCOSE BLD-MCNC: 94 MG/DL (ref 74–99)
HCT VFR BLD CALC: 31.3 % (ref 34–48)
HEMOGLOBIN: 10.4 G/DL (ref 11.5–15.5)
IMMATURE GRANULOCYTES #: 0.02 E9/L
IMMATURE GRANULOCYTES %: 0.5 % (ref 0–5)
LYMPHOCYTES ABSOLUTE: 1.09 E9/L (ref 1.5–4)
LYMPHOCYTES RELATIVE PERCENT: 28.2 % (ref 20–42)
MCH RBC QN AUTO: 30.9 PG (ref 26–35)
MCHC RBC AUTO-ENTMCNC: 33.2 % (ref 32–34.5)
MCV RBC AUTO: 92.9 FL (ref 80–99.9)
MONOCYTES ABSOLUTE: 0.33 E9/L (ref 0.1–0.95)
MONOCYTES RELATIVE PERCENT: 8.5 % (ref 2–12)
NEUTROPHILS ABSOLUTE: 2.35 E9/L (ref 1.8–7.3)
NEUTROPHILS RELATIVE PERCENT: 60.9 % (ref 43–80)
PDW BLD-RTO: 13.3 FL (ref 11.5–15)
PLATELET # BLD: 179 E9/L (ref 130–450)
PMV BLD AUTO: 11 FL (ref 7–12)
POTASSIUM SERPL-SCNC: 3.7 MMOL/L (ref 3.5–5)
RBC # BLD: 3.37 E12/L (ref 3.5–5.5)
SODIUM BLD-SCNC: 142 MMOL/L (ref 132–146)
TOTAL PROTEIN: 5.6 G/DL (ref 6.4–8.3)
WBC # BLD: 3.9 E9/L (ref 4.5–11.5)

## 2020-11-28 PROCEDURE — 1200000000 HC SEMI PRIVATE

## 2020-11-28 PROCEDURE — 36415 COLL VENOUS BLD VENIPUNCTURE: CPT

## 2020-11-28 PROCEDURE — 80053 COMPREHEN METABOLIC PANEL: CPT

## 2020-11-28 PROCEDURE — 6370000000 HC RX 637 (ALT 250 FOR IP): Performed by: NURSE PRACTITIONER

## 2020-11-28 PROCEDURE — 2580000003 HC RX 258: Performed by: NURSE PRACTITIONER

## 2020-11-28 PROCEDURE — 85025 COMPLETE CBC W/AUTO DIFF WBC: CPT

## 2020-11-28 RX ADMIN — MEMANTINE HYDROCHLORIDE 10 MG: 10 TABLET, FILM COATED ORAL at 09:41

## 2020-11-28 RX ADMIN — Medication 10 ML: at 00:45

## 2020-11-28 RX ADMIN — MEMANTINE HYDROCHLORIDE 10 MG: 10 TABLET, FILM COATED ORAL at 00:02

## 2020-11-28 RX ADMIN — DONEPEZIL HYDROCHLORIDE 10 MG: 5 TABLET, FILM COATED ORAL at 00:38

## 2020-11-28 RX ADMIN — MEMANTINE HYDROCHLORIDE 10 MG: 10 TABLET, FILM COATED ORAL at 21:48

## 2020-11-28 RX ADMIN — LEVETIRACETAM 750 MG: 100 SOLUTION ORAL at 09:41

## 2020-11-28 RX ADMIN — POTASSIUM & SODIUM PHOSPHATES POWDER PACK 280-160-250 MG 250 MG: 280-160-250 PACK at 21:48

## 2020-11-28 RX ADMIN — POTASSIUM & SODIUM PHOSPHATES POWDER PACK 280-160-250 MG 250 MG: 280-160-250 PACK at 09:42

## 2020-11-28 RX ADMIN — Medication 5 MG: at 00:02

## 2020-11-28 RX ADMIN — DONEPEZIL HYDROCHLORIDE 10 MG: 5 TABLET, FILM COATED ORAL at 21:48

## 2020-11-28 RX ADMIN — ATORVASTATIN CALCIUM 80 MG: 80 TABLET, FILM COATED ORAL at 21:48

## 2020-11-28 RX ADMIN — LEVETIRACETAM 750 MG: 100 SOLUTION ORAL at 00:01

## 2020-11-28 RX ADMIN — POTASSIUM & SODIUM PHOSPHATES POWDER PACK 280-160-250 MG 250 MG: 280-160-250 PACK at 00:03

## 2020-11-28 RX ADMIN — PSYLLIUM HUSK 1 PACKET: 3.4 GRANULE ORAL at 09:42

## 2020-11-28 RX ADMIN — LEVETIRACETAM 750 MG: 100 SOLUTION ORAL at 21:48

## 2020-11-28 RX ADMIN — ATORVASTATIN CALCIUM 80 MG: 80 TABLET, FILM COATED ORAL at 00:38

## 2020-11-28 NOTE — PROGRESS NOTES
Hospitalist Progress Note      SYNOPSIS: 72 y. o. female with PMH of seizure disorder and dementia who was originally taken to [facility] emergency department for worsening mental status. Patient was reported to have had a fall episode a week prior to presentation and had been having witnessed seizures.  CT head at outside hospital revealed left-sided subacute subdural hematoma so patient was transferred to this facility for further evaluation and management by a neurosurgeon.     On arrival to the emergency department here, repeat CT head showed large subdural collection on the left which is acute or acute on chronic associated with 7 mm midline shift; also had acute left sphenoid sinusitis. She was evaluated by neurosurgery and  underwent a left frontal bur hole drainage and placement of a left subdural drain on 2020. She was admitted to ICU. Neurology was consulted for seizures. An EEG was performed which showed severe diffuse encephalopathy with no epileptiform activity. Subdural drain was removed on 2020. Palliative care consulted, but signed off. On 2020 patient was transferred out of the ICU.   : Medically stable for discharge. Consults signed off. Awaiting family decision regarding [facility]. Hospital day 10     SUBJECTIVE:  Stable overnight. No issues reported. Patient seen and examined  Records reviewed.        Temp (24hrs), Av.4 °F (35.8 °C), Min:95.9 °F (35.5 °C), Max:96.8 °F (36 °C)    DIET: DIET DYSPHAGIA PUREED; Mildly Thick (Nectar)  Dietary Nutrition Supplements: Other Oral Supplement (see comment)  CODE: Full Code    Intake/Output Summary (Last 24 hours) at 2020 0811  Last data filed at 2020 0452  Gross per 24 hour   Intake 420 ml   Output 900 ml   Net -480 ml       OBJECTIVE:    /60   Pulse 55   Temp 95.9 °F (35.5 °C) (Temporal)   Resp 18   Ht 5' 4\" (1.626 m)   Wt 137 lb (62.1 kg)   SpO2 99%   BMI 23.52 kg/m²     General appearance: No apparent distress, appears stated age and cooperative. HEENT: Normal cephalic, atraumatic without obvious deformity. Pupils equal, round, and reactive to light. Extra ocular muscles intact. Conjunctivae/corneas clear. Neck: Supple, with full range of motion. No jugular venous distention. Trachea midline. Respiratory:  Clear to auscultation bilaterally. No apparent distress. Cardiovascular:  Regular rate and rhythm. S1, S2 without murmurs, rubs, or gallops. PV: Brisk capillary refill. +2 pedal and radial pulses bilaterally. No clubbing, cyanosis, edema of bilateral lower extremities. Abdomen: Soft, non-tender, non-distended. +BS  Musculoskeletal: No obvious deformities or erythematous or edematous joints. Skin: Normal skin color. No rashes or lesions. Neurologic:  Neurovascularly intact without any focal sensory/motor deficits. Cranial nerves: II-XII intact, grossly non-focal.  Psychiatric: Alert and oriented, thought content appropriate, normal insight    Medications:  REVIEWED DAILY    Infusion Medications    sodium chloride Stopped (11/25/20 0310)     Scheduled Medications    psyllium  1 packet Per NG tube Daily    potassium & sodium phosphates  1 packet Per NG tube 4x Daily    atorvastatin  80 mg Per NG tube Nightly    donepezil  10 mg Per NG tube Nightly    levETIRAcetam  750 mg Per NG tube BID    Or    levETIRAcetam  750 mg Intravenous BID    memantine  10 mg Per NG tube BID    sodium chloride flush  10 mL Intravenous 2 times per day     PRN Meds: LORazepam, hydrALAZINE, acetaminophen, polyethylene glycol, LORazepam, melatonin, LORazepam, sodium chloride flush, promethazine **OR** ondansetron    Labs:     No results for input(s): WBC, HGB, HCT, PLT in the last 72 hours. No results for input(s): NA, K, CL, CO2, BUN, CREATININE, CALCIUM, PHOS in the last 72 hours.     Invalid input(s): MAGNES    No results for input(s): PROT, ALB, ALKPHOS, ALT, AST, BILITOT, AMYLASE, LIPASE in the last 72 hours. No results for input(s): INR in the last 72 hours. No results for input(s): Les Dawn in the last 72 hours. Chronic labs:    Lab Results   Component Value Date    CHOL 176 07/25/2015    TRIG 42 11/21/2020    HDL 72 07/25/2015    LDLCALC 91 07/25/2015    TSH 1.270 07/25/2015    INR 1.0 11/18/2020       Radiology: REVIEWED DAILY    ASSESSMENT/PLAN:  Left-sided subdural hematoma with midline shift status post bur hole craniotomy with drainage  Neurosurgery signed off with follow-up in 2 weeks  Neurochecks  Pain control  No anticoagulation/antiplatelets, PCD's   Lipitor 80 mg  Seizures  Neurology following peripherally  Keppra 750 mg twice daily  No further seizures, likely related to bleed. Seizure precautions  Dysphagia  SLP following  Puréed solids, nectar thick liquids  Dementia  Namenda    DISPOSITION: [facility] - awaiting family's choice    +++++++++++++++++++++++++++++++++++++++++++++++++  GERALD Richards/ Tin Barnard [address] [place] [address]  +++++++++++++++++++++++++++++++++++++++++++++++++  NOTE: This report was transcribed using voice recognition software. Every effort was made to ensure accuracy; however, inadvertent computerized transcription errors may be present.

## 2020-11-28 NOTE — PROGRESS NOTES
Department of Neurosurgery  Progress Note    CHIEF COMPLAINT: s/p bethany hole craniotomy 11/18    SUBJECTIVE:  Awake. Denies headache. REVIEW OF SYSTEMS :  Unable to obtain.      OBJECTIVE:   VITALS:  /60   Pulse 55   Temp 95.9 °F (35.5 °C) (Temporal)   Resp 18   Ht 5' 4\" (1.626 m)   Wt 137 lb (62.1 kg)   SpO2 99%   BMI 23.52 kg/m²     PHYSICAL:  Neurologic:  Awake and alert  Motor Exam:  Moving all extremities well  Sensory:  Sensory intact  Incision c/d/i      DATA:  CBC:   Lab Results   Component Value Date    WBC 4.2 11/23/2020    RBC 3.46 11/23/2020    HGB 10.7 11/23/2020    HCT 31.1 11/23/2020    MCV 89.9 11/23/2020    MCH 30.9 11/23/2020    MCHC 34.4 11/23/2020    RDW 12.6 11/23/2020     11/23/2020    MPV 11.6 11/23/2020     BMP:    Lab Results   Component Value Date     11/23/2020    K 4.0 11/23/2020    K 4.1 11/22/2020     11/23/2020    CO2 25 11/23/2020    BUN 8 11/23/2020    LABALBU 2.7 11/22/2020    LABALBU 4.4 05/04/2012    CREATININE 0.4 11/23/2020    CALCIUM 8.8 11/23/2020    GFRAA >60 11/23/2020    LABGLOM >60 11/23/2020    GLUCOSE 69 11/23/2020    GLUCOSE 105 05/04/2012     PT/INR:    Lab Results   Component Value Date    PROTIME 10.8 11/18/2020    INR 1.0 11/18/2020     PTT:    Lab Results   Component Value Date    APTT 34.8 11/18/2020   [APTT}    Current Inpatient Medications  Current Facility-Administered Medications: LORazepam (ATIVAN) injection 0.5 mg, 0.5 mg, Intramuscular, Q8H PRN  psyllium (KONSYL) 28.3 % packet 1 packet, 1 packet, Per NG tube, Daily  hydrALAZINE (APRESOLINE) injection 5 mg, 5 mg, Intravenous, Q6H PRN  potassium & sodium phosphates (PHOS-NAK) 280-160-250 MG packet 250 mg, 1 packet, Per NG tube, 4x Daily  atorvastatin (LIPITOR) tablet 80 mg, 80 mg, Per NG tube, Nightly  donepezil (ARICEPT) tablet 10 mg, 10 mg, Per NG tube, Nightly  levETIRAcetam (KEPPRA) 100 MG/ML solution 750 mg, 750 mg, Per NG tube, BID **OR** Levetiracetam (Keppra) 750mg/50ml, 750 mg, Intravenous, BID  memantine (NAMENDA) tablet 10 mg, 10 mg, Per NG tube, BID  acetaminophen (TYLENOL) tablet 650 mg, 650 mg, Per NG tube, Q4H PRN  polyethylene glycol (GLYCOLAX) packet 17 g, 17 g, Per NG tube, Daily PRN  LORazepam (ATIVAN) injection 0.5 mg, 0.5 mg, Intravenous, Q8H PRN  melatonin disintegrating tablet 5 mg, 5 mg, Oral, Nightly PRN  sodium chloride flush 0.9 % injection 10 mL, 10 mL, Intravenous, 2 times per day  LORazepam (ATIVAN) tablet 1 mg, 1 mg, Oral, BID PRN  sodium chloride flush 0.9 % injection 10 mL, 10 mL, Intravenous, PRN  promethazine (PHENERGAN) tablet 12.5 mg, 12.5 mg, Oral, Q6H PRN **OR** ondansetron (ZOFRAN) injection 4 mg, 4 mg, Intravenous, Q6H PRN  0.9 % sodium chloride infusion, , Intravenous, Continuous    ASSESSMENT:   s/p bethany hole craniotomy 11/18    PLAN:  -Serial neurological exams  -Pain control  -No anticoagulation/antiplatelet agents  -Neurology following  -Follow up in neurosurgery clinic in 2 weeks for staple removal (12/2/20)and in 4 weeks with head CT scan  -Discharge/rehab placement planning    Electronically signed by Cheli Beavers PA-C on 11/28/2020 at 10:14 AM

## 2020-11-29 PROCEDURE — 1200000000 HC SEMI PRIVATE

## 2020-11-29 PROCEDURE — 2580000003 HC RX 258: Performed by: NURSE PRACTITIONER

## 2020-11-29 PROCEDURE — 6370000000 HC RX 637 (ALT 250 FOR IP): Performed by: NURSE PRACTITIONER

## 2020-11-29 RX ADMIN — MEMANTINE HYDROCHLORIDE 10 MG: 10 TABLET, FILM COATED ORAL at 09:45

## 2020-11-29 RX ADMIN — PSYLLIUM HUSK 1 PACKET: 3.4 GRANULE ORAL at 09:45

## 2020-11-29 RX ADMIN — DONEPEZIL HYDROCHLORIDE 10 MG: 5 TABLET, FILM COATED ORAL at 20:59

## 2020-11-29 RX ADMIN — POTASSIUM & SODIUM PHOSPHATES POWDER PACK 280-160-250 MG 250 MG: 280-160-250 PACK at 20:59

## 2020-11-29 RX ADMIN — LEVETIRACETAM 750 MG: 100 SOLUTION ORAL at 20:59

## 2020-11-29 RX ADMIN — LEVETIRACETAM 750 MG: 100 SOLUTION ORAL at 09:45

## 2020-11-29 RX ADMIN — Medication 10 ML: at 21:00

## 2020-11-29 RX ADMIN — POTASSIUM & SODIUM PHOSPHATES POWDER PACK 280-160-250 MG 250 MG: 280-160-250 PACK at 16:22

## 2020-11-29 RX ADMIN — POTASSIUM & SODIUM PHOSPHATES POWDER PACK 280-160-250 MG 250 MG: 280-160-250 PACK at 09:45

## 2020-11-29 RX ADMIN — MEMANTINE HYDROCHLORIDE 10 MG: 10 TABLET, FILM COATED ORAL at 20:59

## 2020-11-29 RX ADMIN — ATORVASTATIN CALCIUM 80 MG: 80 TABLET, FILM COATED ORAL at 20:59

## 2020-11-29 ASSESSMENT — PAIN SCALES - GENERAL
PAINLEVEL_OUTOF10: 0
PAINLEVEL_OUTOF10: 0

## 2020-11-29 NOTE — PROGRESS NOTES
Hospitalist Progress Note      SYNOPSIS: 72 y. o. female with PMH of seizure disorder and dementia who was originally taken to [facility] emergency department for worsening mental status. Patient was reported to have had a fall episode a week prior to presentation and had been having witnessed seizures.  CT head at outside hospital revealed left-sided subacute subdural hematoma so patient was transferred to this facility for further evaluation and management by a neurosurgeon.     On arrival to the emergency department here, repeat CT head showed large subdural collection on the left which is acute or acute on chronic associated with 7 mm midline shift; also had acute left sphenoid sinusitis. Meeta Tipton was evaluated by neurosurgery and  underwent a left frontal bur hole drainage and placement of a left subdural drain on 2020. She was admitted to ICU. Neurology was consulted for seizures. An EEG was performed which showed severe diffuse encephalopathy with no epileptiform activity. Subdural drain was removed on 2020. Palliative care consulted, but signed off. On 2020 patient was transferred out of the ICU.   : Medically stable for discharge. Consults signed off. Awaiting family decision regarding [facility].  : Medically stable for discharge. Patient with no complaints today. Updated daughter, Patrick Medeiros. Hospital day 11     SUBJECTIVE:  Stable overnight. No issues reported. Patient seen and examined  Records reviewed.        Temp (24hrs), Av.7 °F (35.9 °C), Min:95.9 °F (35.5 °C), Max:97.1 °F (36.2 °C)    DIET: DIET DYSPHAGIA PUREED; Mildly Thick (Nectar)  Dietary Nutrition Supplements: Other Oral Supplement (see comment)  CODE: Full Code    Intake/Output Summary (Last 24 hours) at 2020 0729  Last data filed at 2020 4126  Gross per 24 hour   Intake 270 ml   Output 300 ml   Net -30 ml       OBJECTIVE:    /74   Pulse 68   Temp 97.1 °F (36.2 °C) (Temporal) Resp 18   Ht 5' 4\" (1.626 m)   Wt 137 lb (62.1 kg)   SpO2 98%   BMI 23.52 kg/m²     General appearance: No apparent distress, appears stated age and cooperative. HEENT: Normal cephalic, atraumatic without obvious deformity. Pupils equal, round, and reactive to light. Extra ocular muscles intact. Conjunctivae/corneas clear. Neck: Supple, with full range of motion. No jugular venous distention. Trachea midline. Respiratory:  Clear to auscultation bilaterally. No apparent distress. Cardiovascular:  Regular rate and rhythm. S1, S2 without murmurs, rubs, or gallops. PV: Brisk capillary refill. +2 pedal and radial pulses bilaterally. No clubbing, cyanosis, edema of bilateral lower extremities. Abdomen: Soft, non-tender, non-distended. +BS  Musculoskeletal: No obvious deformities or erythematous or edematous joints. Skin: Normal skin color. No rashes or lesions. Neurologic:  Neurovascularly intact without any focal sensory/motor deficits.  Cranial nerves: II-XII intact, grossly non-focal.  Psychiatric: Alert and oriented, thought content appropriate, normal insight    Medications:  REVIEWED DAILY    Infusion Medications    sodium chloride Stopped (11/25/20 0310)     Scheduled Medications    psyllium  1 packet Per NG tube Daily    potassium & sodium phosphates  1 packet Per NG tube 4x Daily    atorvastatin  80 mg Per NG tube Nightly    donepezil  10 mg Per NG tube Nightly    levETIRAcetam  750 mg Per NG tube BID    Or    levETIRAcetam  750 mg Intravenous BID    memantine  10 mg Per NG tube BID    sodium chloride flush  10 mL Intravenous 2 times per day     PRN Meds: LORazepam, hydrALAZINE, acetaminophen, polyethylene glycol, LORazepam, melatonin, LORazepam, sodium chloride flush, promethazine **OR** ondansetron    Labs:     Recent Labs     11/28/20  1152   WBC 3.9*   HGB 10.4*   HCT 31.3*          Recent Labs     11/28/20  1152      K 3.7      CO2 25   BUN 6*   CREATININE 0.5 CALCIUM 9.1       Recent Labs     11/28/20  1152   PROT 5.6*   ALKPHOS 60   ALT 21   AST 25   BILITOT 0.5     Chronic labs:    Lab Results   Component Value Date    CHOL 176 07/25/2015    TRIG 42 11/21/2020    HDL 72 07/25/2015    LDLCALC 91 07/25/2015    TSH 1.270 07/25/2015    INR 1.0 11/18/2020       Radiology: REVIEWED DAILY    ASSESSMENT/PLAN:  Left-sided subdural hematoma with midline shift status post bur hole craniotomy with drainage  Neurosurgery signed off with follow-up in 2 weeks  Neurochecks  Pain control  No anticoagulation/antiplatelets, PCD's   Lipitor 80 mg  Seizures  Neurology following peripherally  Keppra 750 mg twice daily  No further seizures, likely related to bleed. Seizure precautions  Dysphagia  SLP following  Puréed solids, nectar thick liquids  Dementia  Namenda      DISPOSITION: [facility] - awaiting family's choice    +++++++++++++++++++++++++++++++++++++++++++++++++  Hilda Comas, C/ [address]  +++++++++++++++++++++++++++++++++++++++++++++++++  NOTE: This report was transcribed using voice recognition software. Every effort was made to ensure accuracy; however, inadvertent computerized transcription errors may be present.

## 2020-11-29 NOTE — PROGRESS NOTES
Department of Neurosurgery  Progress Note    CHIEF COMPLAINT: s/p bethany hole craniotomy 11/18    SUBJECTIVE:  Awake and alert. Conversing. No new issues overnight. REVIEW OF SYSTEMS :  Unable to obtain.      OBJECTIVE:   VITALS:  /87   Pulse 60   Temp 97.8 °F (36.6 °C) (Temporal)   Resp 18   Ht 5' 4\" (1.626 m)   Wt 137 lb (62.1 kg)   SpO2 97%   BMI 23.52 kg/m²     PHYSICAL:  Neurologic:  Awake and alert  Motor Exam:  Moving all extremities well  Sensory:  Sensory intact  Incision c/d/i      DATA:  CBC:   Lab Results   Component Value Date    WBC 3.9 11/28/2020    RBC 3.37 11/28/2020    HGB 10.4 11/28/2020    HCT 31.3 11/28/2020    MCV 92.9 11/28/2020    MCH 30.9 11/28/2020    MCHC 33.2 11/28/2020    RDW 13.3 11/28/2020     11/28/2020    MPV 11.0 11/28/2020     BMP:    Lab Results   Component Value Date     11/28/2020    K 3.7 11/28/2020    K 4.1 11/22/2020     11/28/2020    CO2 25 11/28/2020    BUN 6 11/28/2020    LABALBU 2.9 11/28/2020    LABALBU 4.4 05/04/2012    CREATININE 0.5 11/28/2020    CALCIUM 9.1 11/28/2020    GFRAA >60 11/28/2020    LABGLOM >60 11/28/2020    GLUCOSE 94 11/28/2020    GLUCOSE 105 05/04/2012     PT/INR:    Lab Results   Component Value Date    PROTIME 10.8 11/18/2020    INR 1.0 11/18/2020     PTT:    Lab Results   Component Value Date    APTT 34.8 11/18/2020   [APTT}    Current Inpatient Medications  Current Facility-Administered Medications: LORazepam (ATIVAN) injection 0.5 mg, 0.5 mg, Intramuscular, Q8H PRN  psyllium (KONSYL) 28.3 % packet 1 packet, 1 packet, Per NG tube, Daily  hydrALAZINE (APRESOLINE) injection 5 mg, 5 mg, Intravenous, Q6H PRN  potassium & sodium phosphates (PHOS-NAK) 280-160-250 MG packet 250 mg, 1 packet, Per NG tube, 4x Daily  atorvastatin (LIPITOR) tablet 80 mg, 80 mg, Per NG tube, Nightly  donepezil (ARICEPT) tablet 10 mg, 10 mg, Per NG tube, Nightly  levETIRAcetam (KEPPRA) 100 MG/ML solution 750 mg, 750 mg, Per NG tube, BID **OR** Levetiracetam (Keppra) 750mg/50ml, 750 mg, Intravenous, BID  memantine (NAMENDA) tablet 10 mg, 10 mg, Per NG tube, BID  acetaminophen (TYLENOL) tablet 650 mg, 650 mg, Per NG tube, Q4H PRN  polyethylene glycol (GLYCOLAX) packet 17 g, 17 g, Per NG tube, Daily PRN  LORazepam (ATIVAN) injection 0.5 mg, 0.5 mg, Intravenous, Q8H PRN  melatonin disintegrating tablet 5 mg, 5 mg, Oral, Nightly PRN  sodium chloride flush 0.9 % injection 10 mL, 10 mL, Intravenous, 2 times per day  LORazepam (ATIVAN) tablet 1 mg, 1 mg, Oral, BID PRN  sodium chloride flush 0.9 % injection 10 mL, 10 mL, Intravenous, PRN  promethazine (PHENERGAN) tablet 12.5 mg, 12.5 mg, Oral, Q6H PRN **OR** ondansetron (ZOFRAN) injection 4 mg, 4 mg, Intravenous, Q6H PRN  0.9 % sodium chloride infusion, , Intravenous, Continuous    ASSESSMENT:   s/p bethany hole craniotomy 11/18    PLAN:  -Serial neurological exams  -Pain control  -No anticoagulation/antiplatelet agents  -Neurology following  -Follow up in neurosurgery clinic in 2 weeks for staple removal (12/2/20)and in 4 weeks with head CT scan  -Discharge/rehab placement planning    Electronically signed by Cheli Beavers PA-C on 11/29/2020 at 10:31 AM

## 2020-11-30 VITALS
HEIGHT: 64 IN | SYSTOLIC BLOOD PRESSURE: 129 MMHG | WEIGHT: 137 LBS | TEMPERATURE: 96.8 F | BODY MASS INDEX: 23.39 KG/M2 | DIASTOLIC BLOOD PRESSURE: 60 MMHG | RESPIRATION RATE: 18 BRPM | OXYGEN SATURATION: 94 % | HEART RATE: 75 BPM

## 2020-11-30 LAB — SARS-COV-2, NAAT: NOT DETECTED

## 2020-11-30 PROCEDURE — 6370000000 HC RX 637 (ALT 250 FOR IP): Performed by: NURSE PRACTITIONER

## 2020-11-30 PROCEDURE — U0002 COVID-19 LAB TEST NON-CDC: HCPCS

## 2020-11-30 RX ORDER — ATORVASTATIN CALCIUM 80 MG/1
80 TABLET, FILM COATED ORAL NIGHTLY
Qty: 30 TABLET | Refills: 0 | Status: SHIPPED | OUTPATIENT
Start: 2020-11-30

## 2020-11-30 RX ADMIN — POTASSIUM & SODIUM PHOSPHATES POWDER PACK 280-160-250 MG 250 MG: 280-160-250 PACK at 11:58

## 2020-11-30 RX ADMIN — LEVETIRACETAM 750 MG: 100 SOLUTION ORAL at 11:46

## 2020-11-30 RX ADMIN — PSYLLIUM HUSK 1 PACKET: 3.4 GRANULE ORAL at 11:58

## 2020-11-30 RX ADMIN — MEMANTINE HYDROCHLORIDE 10 MG: 10 TABLET, FILM COATED ORAL at 11:51

## 2020-11-30 ASSESSMENT — PAIN SCALES - WONG BAKER: WONGBAKER_NUMERICALRESPONSE: 0

## 2020-11-30 NOTE — PROGRESS NOTES
Hospitalist Progress Note      SYNOPSIS: 65 y. o. female with PMH of seizure disorder and dementia who was originally taken to [facility] emergency department for worsening mental status. Patient was reported to have had a fall episode a week prior to presentation and had been having witnessed seizures.  CT head at outside hospital revealed left-sided subacute subdural hematoma so patient was transferred to this facility for further evaluation and management by a neurosurgeon.     On arrival to the emergency department here, repeat CT head showed large subdural collection on the left which is acute or acute on chronic associated with 7 mm midline shift; also had acute left sphenoid sinusitis.  She was evaluated by neurosurgery and  underwent a left frontal bur hole drainage and placement of a left subdural drain on 2020.  She was admitted to ICU. Neurology was consulted for seizures.  An EEG was performed which showed severe diffuse encephalopathy with no epileptiform activity.  Subdural drain was removed on 2020.  Palliative care consulted, but signed off.  On 2020 patient was transferred out of the ICU.   : Medically stable for discharge. Consults signed off. Awaiting family decision regarding HERMAN.  : Medically stable for discharge. Patient with no complaints today. Updated daughter, Patrick Medeiros.  : Medically stable for discharge. Referral made to [facility] day 12     SUBJECTIVE:  Stable overnight. No issues reported. Patient seen and examined  Records reviewed.        Temp (24hrs), Av.7 °F (36.5 °C), Min:97.4 °F (36.3 °C), Max:97.8 °F (36.6 °C)    DIET: DIET DYSPHAGIA PUREED; Mildly Thick (Nectar)  Dietary Nutrition Supplements: Other Oral Supplement (see comment)  CODE: Full Code    Intake/Output Summary (Last 24 hours) at 2020 0710  Last data filed at 2020 1901  Gross per 24 hour   Intake 30 ml   Output 300 ml   Net -270 ml OBJECTIVE:    /76   Pulse 62   Temp 97.4 °F (36.3 °C) (Temporal)   Resp 18   Ht 5' 4\" (1.626 m)   Wt 137 lb (62.1 kg)   SpO2 97%   BMI 23.52 kg/m²     General appearance: No apparent distress, appears stated age and cooperative. HEENT: Normal cephalic, atraumatic without obvious deformity. Pupils equal, round, and reactive to light.  Extra ocular muscles intact. Conjunctivae/corneas clear. Neck: Supple, with full range of motion. No jugular venous distention. Trachea midline. Respiratory:  Clear to auscultation bilaterally.  No apparent distress. Cardiovascular:  Regular rate and rhythm. S1, S2 without murmurs, rubs, or gallops. PV: Brisk capillary refill.  +2 pedal and radial pulses bilaterally. No clubbing, cyanosis, edema of bilateral lower extremities. Abdomen: Soft, non-tender, non-distended. +BS  Musculoskeletal: No obvious deformities or erythematous or edematous joints. Skin: Normal skin color.  No rashes or lesions. Neurologic:  Neurovascularly intact without any focal sensory/motor deficits.  Cranial nerves: II-XII intact, grossly non-focal.  Psychiatric: Alert and oriented, thought content appropriate, normal insight    Medications:  REVIEWED DAILY    Infusion Medications    sodium chloride Stopped (11/25/20 0310)     Scheduled Medications    psyllium  1 packet Per NG tube Daily    potassium & sodium phosphates  1 packet Per NG tube 4x Daily    atorvastatin  80 mg Per NG tube Nightly    donepezil  10 mg Per NG tube Nightly    levETIRAcetam  750 mg Per NG tube BID    Or    levETIRAcetam  750 mg Intravenous BID    memantine  10 mg Per NG tube BID    sodium chloride flush  10 mL Intravenous 2 times per day     PRN Meds: LORazepam, hydrALAZINE, acetaminophen, polyethylene glycol, LORazepam, melatonin, LORazepam, sodium chloride flush, promethazine **OR** ondansetron    Labs:     Recent Labs     11/28/20  1152   WBC 3.9*   HGB 10.4*   HCT 31.3*          Recent Labs

## 2020-11-30 NOTE — PROGRESS NOTES
Department of Neurosurgery  Progress Note    CHIEF COMPLAINT: s/p bethany hole craniotomy 11/18    SUBJECTIVE:  No new issues overnight. REVIEW OF SYSTEMS :  Unable to obtain.      OBJECTIVE:   VITALS:  /76   Pulse 62   Temp 97.4 °F (36.3 °C) (Temporal)   Resp 18   Ht 5' 4\" (1.626 m)   Wt 137 lb (62.1 kg)   SpO2 97%   BMI 23.52 kg/m²     PHYSICAL:  Neurologic:  Awake and alert  Motor Exam:  Moving all extremities well  Sensory:  Sensory intact  Incision c/d/i      DATA:  CBC:   Lab Results   Component Value Date    WBC 3.9 11/28/2020    RBC 3.37 11/28/2020    HGB 10.4 11/28/2020    HCT 31.3 11/28/2020    MCV 92.9 11/28/2020    MCH 30.9 11/28/2020    MCHC 33.2 11/28/2020    RDW 13.3 11/28/2020     11/28/2020    MPV 11.0 11/28/2020     BMP:    Lab Results   Component Value Date     11/28/2020    K 3.7 11/28/2020    K 4.1 11/22/2020     11/28/2020    CO2 25 11/28/2020    BUN 6 11/28/2020    LABALBU 2.9 11/28/2020    LABALBU 4.4 05/04/2012    CREATININE 0.5 11/28/2020    CALCIUM 9.1 11/28/2020    GFRAA >60 11/28/2020    LABGLOM >60 11/28/2020    GLUCOSE 94 11/28/2020    GLUCOSE 105 05/04/2012     PT/INR:    Lab Results   Component Value Date    PROTIME 10.8 11/18/2020    INR 1.0 11/18/2020     PTT:    Lab Results   Component Value Date    APTT 34.8 11/18/2020   [APTT}    Current Inpatient Medications  Current Facility-Administered Medications: LORazepam (ATIVAN) injection 0.5 mg, 0.5 mg, Intramuscular, Q8H PRN  psyllium (KONSYL) 28.3 % packet 1 packet, 1 packet, Per NG tube, Daily  hydrALAZINE (APRESOLINE) injection 5 mg, 5 mg, Intravenous, Q6H PRN  potassium & sodium phosphates (PHOS-NAK) 280-160-250 MG packet 250 mg, 1 packet, Per NG tube, 4x Daily  atorvastatin (LIPITOR) tablet 80 mg, 80 mg, Per NG tube, Nightly  donepezil (ARICEPT) tablet 10 mg, 10 mg, Per NG tube, Nightly  levETIRAcetam (KEPPRA) 100 MG/ML solution 750 mg, 750 mg, Per NG tube, BID **OR** Levetiracetam (Keppra) 750mg/50ml, 750 mg, Intravenous, BID  memantine (NAMENDA) tablet 10 mg, 10 mg, Per NG tube, BID  acetaminophen (TYLENOL) tablet 650 mg, 650 mg, Per NG tube, Q4H PRN  polyethylene glycol (GLYCOLAX) packet 17 g, 17 g, Per NG tube, Daily PRN  LORazepam (ATIVAN) injection 0.5 mg, 0.5 mg, Intravenous, Q8H PRN  melatonin disintegrating tablet 5 mg, 5 mg, Oral, Nightly PRN  sodium chloride flush 0.9 % injection 10 mL, 10 mL, Intravenous, 2 times per day  LORazepam (ATIVAN) tablet 1 mg, 1 mg, Oral, BID PRN  sodium chloride flush 0.9 % injection 10 mL, 10 mL, Intravenous, PRN  promethazine (PHENERGAN) tablet 12.5 mg, 12.5 mg, Oral, Q6H PRN **OR** ondansetron (ZOFRAN) injection 4 mg, 4 mg, Intravenous, Q6H PRN  0.9 % sodium chloride infusion, , Intravenous, Continuous    ASSESSMENT:   s/p bethany hole craniotomy 11/18    PLAN:  -Serial neurological exams  -Pain control  -No anticoagulation/antiplatelet agents  -Neurology following  -Follow up in neurosurgery clinic in 2 weeks for staple removal (12/2/20)and in 4 weeks with head CT scan  -Discharge/rehab placement planning    Electronically signed by Laurie Atkinson PA-C on 11/30/2020 at 7:35 AM

## 2020-11-30 NOTE — DISCHARGE SUMMARY
Hospitalist Discharge Summary    Patient ID: Jayro Brown   Patient : 1955  Patient's PCP: No primary care provider on file. Admit Date: 2020   Admitting Physician: Kitty Rizo MD    Discharge Date:  2020   Discharge Physician: MINA Flores NP   Discharge Condition: Stable  Discharge Disposition: [address] [facility] course in brief:  (Please refer to daily progress notes for a comprehensive review of the hospitalization by requesting medical records)    Patient was originally taken to [facility] ED for worsening mental status. Patient reportedly had a fall 1 week prior to presentation and and having witnessed seizures. CT of the head showed left-sided subacute subdural hematoma and the patient was transferred here for further evaluation  by neurosurgery. On arrival to the emergency department here, repeat CT head showed large subdural collection on the left which is acute or acute on chronic associated with 7 mm midline shift; also had acute left sphenoid sinusitis.  She was evaluated by neurosurgery and  underwent a left frontal bur hole drainage and placement of a left subdural drain on 2020.  She was admitted to ICU. Neurology was consulted for seizures.  An EEG was performed which showed severe diffuse encephalopathy with no epileptiform activity.  Subdural drain was removed on 2020.  Palliative care consulted, but signed off.  On 2020 patient was transferred out of the ICU. Neurology signed off. Neurosurgery okay with discharge with follow-up in 2 weeks. Medically stable for discharge to [facility].       Consults:   IP CONSULT TO TRAUMA SURGERY  IP CONSULT TO NEUROSURGERY  IP CONSULT TO INTERNAL MEDICINE  IP CONSULT TO NEUROLOGY  IP CONSULT TO CRITICAL CARE  IP CONSULT TO PALLIATIVE CARE  IP CONSULT TO IV TEAM  IP CONSULT TO DIETITIAN  IP CONSULT TO IV TEAM    Discharge Diagnoses:    Left-sided subdural hematoma with midline shift status post bur hole craniotomy with drainage  Seizures  Dysphagia  Dementia    Discharge Instructions / Follow up: Follow-up with neurosurgery as scheduled. Puréed solids, nectar thick liquids. Check BMP in 1 week. Future Appointments   Date Time Provider Louie Martinez   12/4/2020  2:00 PM Aidan Salinaspatricia jacobsdavid [facility]EL[facility]   12/14/2020  1:00 PM Javier Salas, [address] [place] [address]   2/9/2021  1:00 PM JOANNE Granado [facility]       The patient's condition is stable. At this time the patient is without objective evidence of an acute process requiring continuing hospitalization or inpatient management. They are stable for discharge with outpatient follow-up. I have spoken with the patient and discussed the results of the current hospitalization, in addition to providing specific details for the plan of care and counseling regarding the diagnosis and prognosis. The plan has been discussed in detail and they are aware of the specific conditions for emergent return, as well as the importance of follow-up.   Their questions are answered at this time and they are agreeable with the plan for discharge to [facility]. .    Continued appropriate risk factor modification of blood pressure, diabetes and serum lipids will remain essential to reducing risk of future atherosclerotic development    Activity: activity as tolerated    Significant labs:  CBC:   Recent Labs     11/28/20  1152   WBC 3.9*   RBC 3.37*   HGB 10.4*   HCT 31.3*   MCV 92.9   RDW 13.3        BMP:   Recent Labs     11/28/20  1152      K 3.7      CO2 25   BUN 6*   CREATININE 0.5     LFT:  Recent Labs     11/28/20  1152   PROT 5.6*   ALKPHOS 60   ALT 21   AST 25   BILITOT 0.5     Folate and B12:   Lab Results   Component Value Date    ZSDWVPGL08 954 (H) 03/07/2015   ,   Lab Results   Component Value Date    FOLATE >20.0 03/07/2015     Thyroid Studies:   Lab Results Component Value Date    TSH 1.270 07/25/2015     Imaging:  Xr Pelvis (1-2 Views)    Result Date: 11/18/2020  EXAMINATION: ONE XRAY VIEW OF THE PELVIS 11/18/2020 2:30 am COMPARISON: None. HISTORY: ORDERING SYSTEM PROVIDED HISTORY: trauma TECHNOLOGIST PROVIDED HISTORY: Reason for exam:->trauma What reading provider will be dictating this exam?->CRC FINDINGS: No fracture is identified. Joint space alignment is normal.  There are no significant degenerative changes. Bladder catheter is in place. The soft tissues are unremarkable. No fracture or malalignment. Xr Abdomen (kub) (single Ap View)    Result Date: 11/23/2020  EXAMINATION: ONE SUPINE XRAY VIEW(S) OF THE ABDOMEN 11/23/2020 3:43 pm COMPARISON: None. HISTORY: ORDERING SYSTEM PROVIDED HISTORY: small bowel feeding tub TECHNOLOGIST PROVIDED HISTORY: Reason for exam:->small bowel feeding tub What reading provider will be dictating this exam?->CRC FINDINGS: Feeding tube descends below the diaphragm overlying the body of the stomach and crossing the midline in the region of the gastric antrum. Moderate amount of stool and gas within the colon increased amount of stool in the rectum and sigmoid. No dilated small bowel loops observed    Feeding tube with tip overlying the stomach distally in the region of the gastric antrum    Xr Abdomen (kub) (single Ap View)    Result Date: 11/20/2020  EXAMINATION: ONE SUPINE XRAY VIEW(S) OF THE ABDOMEN 11/20/2020 1:19 pm COMPARISON: Portable chest 11/18/2020 HISTORY: ORDERING SYSTEM PROVIDED HISTORY: feeding tube placement TECHNOLOGIST PROVIDED HISTORY: Reason for exam:->feeding tube placement What reading provider will be dictating this exam?->CRC FINDINGS: Distal tip of a enteric tube is present in the right upper quadrant of the abdomen, in the expected region of the distal stomach. Prominent gas in proximal stomach. Visualized lower lungs are clear. No intestinal distention in the visualized abdomen.     Distal tip of a enteric tube is present in the right upper quadrant of the abdomen, in the expected region of the distal stomach    Ct Head Wo Contrast    Result Date: 11/19/2020  EXAMINATION: CT OF THE HEAD WITHOUT CONTRAST  11/19/2020 1:49 pm TECHNIQUE: CT of the head was performed without the administration of intravenous contrast. Dose modulation, iterative reconstruction, and/or weight based adjustment of the mA/kV was utilized to reduce the radiation dose to as low as reasonably achievable. COMPARISON: CT head November 18, 2020 HISTORY: ORDERING SYSTEM PROVIDED HISTORY: s/p craniotomy TECHNOLOGIST PROVIDED HISTORY: Reason for exam:->s/p craniotomy Has a \"code stroke\" or \"stroke alert\" been called? ->No What reading provider will be dictating this exam?->CRC FINDINGS: BRAIN/VENTRICLES: There is continue evolution of the large left frontal subdural hematoma, now demonstrating multiple linear hyperdensities and measures 1.6 cm in thickness. Pneumocephalus identified, likely postoperative. Mild interval improvement in in left to right midline shift no approximately 6 mm in the coronal plane. No uncal herniation. ORBITS: The visualized portion of the orbits demonstrate no acute abnormality. SINUSES: The visualized paranasal sinuses and mastoid air cells demonstrate no acute abnormality. SOFT TISSUES/SKULL:  Evidence of interval left frontal craniotomy. Subcutaneous soft tissue air locules are identified, likely postoperative. Interval left frontal craniotomy with expected postoperative changes in the soft tissues overlying the calvarium. Evolving left subdural hematoma, now measures 1.6 cm in thickness.   Mild interval improvement in left to right midline shift    Ct Head Wo Contrast    Result Date: 11/18/2020  EXAMINATION: CT OF THE HEAD WITHOUT CONTRAST  11/18/2020 3:05 am TECHNIQUE: CT of the head was performed without the administration of intravenous contrast. Dose modulation, iterative reconstruction, and/or weight based adjustment of the mA/kV was utilized to reduce the radiation dose to as low as reasonably achievable. COMPARISON: 03/12/2015 HISTORY: ORDERING SYSTEM PROVIDED HISTORY: subdural hematoma TECHNOLOGIST PROVIDED HISTORY: Reason for exam:->subdural hematoma Has a \"code stroke\" or \"stroke alert\" been called? ->No What reading provider will be dictating this exam?->CRC FINDINGS: BRAIN/VENTRICLES: There is a septated extra-axial collection overlying the left frontal and parietal lobes. Maximal thickness is 2.4 cm. This is mostly low to intermediate density though there are multiple linear bands of high attenuation within the collection. There is 7 mm of midline shift. There is no hydrocephalus. No evidence for acute ischemia is identified. ORBITS: The visualized portion of the orbits demonstrate no acute abnormality. SINUSES: There is an air-fluid level in the left sphenoid sinus. There is minimal mucosal thickening in the right maxillary sinus. The mastoid air cells are clear. SOFT TISSUES/SKULL:  No acute abnormality of the visualized skull or soft tissues. 1. Large subdural collection on the left is either acute or acute on chronic. There is 7 mm of midline shift. 2. Acute left sphenoid sinusitis. Critical results were called by Dr. Afshin Lim MD to [facility] on 11/18/2020 at 04:21. Ct Cervical Spine Wo Contrast    Result Date: 11/18/2020  EXAMINATION: CT OF THE CERVICAL SPINE WITHOUT CONTRAST 11/18/2020 3:05 am TECHNIQUE: CT of the cervical spine was performed without the administration of intravenous contrast. Multiplanar reformatted images are provided for review. Dose modulation, iterative reconstruction, and/or weight based adjustment of the mA/kV was utilized to reduce the radiation dose to as low as reasonably achievable. COMPARISON: None.  HISTORY: ORDERING SYSTEM PROVIDED HISTORY: fall TECHNOLOGIST PROVIDED HISTORY: Reason for exam:->fall What reading provider will be dictating this exam?->CRC Neck pain status post fall FINDINGS: CERVICAL SPINE: Occipital Condyles: Intact. C1: Intact. Odontoid Process: Intact. Cervical Vertebral Body Heights: Normal. Facets: Intact. Spinous Processes: Intact. Alignment: Straightening of the cervical lordosis, possibly related to positioning. No traumatic subluxation or dislocation. Degenerative changes: Moderate disc height loss at C5-6 and C6-7. Mild disc height loss at C4-5. Scattered hypertrophic facet changes. Soft tissues: The paraspinal soft tissues show no acute findings. Bilateral apical scarring. No acute consolidation. No acute abnormality of the cervical spine. Xr Chest Portable    Result Date: 11/18/2020  EXAMINATION: ONE XRAY VIEW OF THE CHEST 11/18/2020 2:30 am COMPARISON: None. HISTORY: ORDERING SYSTEM PROVIDED HISTORY: trauma TECHNOLOGIST PROVIDED HISTORY: Reason for exam:->trauma What reading provider will be dictating this exam?->CRC FINDINGS: There is an approximately 1 cm nodule adjacent to the right hilum. An adjacent triangular shaped structure is probably overlapping blood vessels. The left lung is clear. The cardiomediastinal silhouette is normal.  The costophrenic angles are sharp. There is no discernible pneumothorax. No fracture is identified. 1. No acute traumatic abnormality identified. 2. Right perihilar nodule. Nonemergent routine chest CT scan is recommended for further evaluation. Fl Modified Barium Swallow W Video    Result Date: 11/24/2020  EXAMINATION: MODIFIED BARIUM SWALLOW WAS PERFORMED IN CONJUNCTION WITH SPEECH PATHOLOGY SERVICES TECHNIQUE: Fluoroscopic evaluation of the swallowing mechanism was performed with multiple consistency of barium product.  FLUOROSCOPY DOSE AND TYPE OR TIME AND EXPOSURES: Fluoroscopy time 1.1 minutes Dose: 7 mGy COMPARISON: None HISTORY: ORDERING SYSTEM PROVIDED HISTORY: Dysphagia TECHNOLOGIST PROVIDED HISTORY: Reason for exam:->Dysphagia What reading provider will be dictating this exam?->CRC FINDINGS: Oral phase of swallowing was grossly within normal limits. No evidence of laryngeal penetration or aspiration. Swallowing mechanism grossly within normal limits without evidence of aspiration. Please see separate speech pathology report for full discussion of findings and recommendations. Discharge Medications:      Medication List      START taking these medications    atorvastatin 80 MG tablet  Commonly known as:  LIPITOR  1 tablet by Per NG tube route nightly        CONTINUE taking these medications    donepezil 10 MG tablet  Commonly known as:  ARICEPT     levETIRAcetam 750 MG tablet  Commonly known as:  KEPPRA     LORazepam 1 MG tablet  Commonly known as:  ATIVAN     memantine 10 MG tablet  Commonly known as:  NAMENDA           Where to Get Your Medications      You can get these medications from any pharmacy    Bring a paper prescription for each of these medications  · atorvastatin 80 MG tablet         Time Spent on discharge is more than 45 minutes in the examination, evaluation, counseling and review of medications and discharge plan.    +++++++++++++++++++++++++++++++++++++++++++++++++  GERALD Mcmillan/ Tin [address]  +++++++++++++++++++++++++++++++++++++++++++++++++  NOTE: This report was transcribed using voice recognition software. Every effort was made to ensure accuracy; however, inadvertent computerized transcription errors may be present.

## 2020-11-30 NOTE — CARE COORDINATION
[facility] accepted Pt to [facility]. [address]  Pt at 2pm.  SW informed Nurse and Dtr - Janki that Pt was accepted at [facility] to  at 2pm.  Envelope and PASRR completed. See soft chart. Discharge Plan is to [facility].   SONYA VicenteSDMITRY.  [phone]

## 2020-11-30 NOTE — CARE COORDINATION
Referral made to [facility] Dr. Artur Marte, L.S.W.  [phone]

## 2020-12-03 LAB
SARS-COV-2: NOT DETECTED
SOURCE: NORMAL

## 2020-12-12 LAB
SARS-COV-2: NOT DETECTED
SOURCE: NORMAL

## 2020-12-14 ENCOUNTER — OFFICE VISIT (OUTPATIENT)
Dept: NEUROSURGERY | Age: 65
End: 2020-12-14

## 2020-12-14 ENCOUNTER — HOSPITAL ENCOUNTER (OUTPATIENT)
Dept: CT IMAGING | Age: 65
Discharge: HOME | End: 2020-12-16
Payer: MEDICARE

## 2020-12-14 VITALS — TEMPERATURE: 97.2 F

## 2020-12-14 PROCEDURE — 99024 POSTOP FOLLOW-UP VISIT: CPT | Performed by: PHYSICIAN ASSISTANT

## 2020-12-14 PROCEDURE — 70450 CT HEAD/BRAIN W/O DYE: CPT

## 2020-12-14 NOTE — PROGRESS NOTES
Post Operative Follow-up    Patient is status post: left bethany hole craniotomy. Back at her baseline confusion/dementia per her daughter and sister. Physical Exam  Alert and Oriented to self only  JOYCE TAVERAS 4/5. FC  Wound: C/D/I    A/P: patient is s/p left bethany hole craniotomy one month ago. Head CT shows persistent left SDH that is chronic. F/u in 2 months with head CT. No OAC.

## 2021-02-09 ENCOUNTER — HOSPITAL ENCOUNTER (OUTPATIENT)
Dept: CT IMAGING | Age: 66
Discharge: HOME | End: 2021-02-11
Payer: MEDICARE

## 2021-02-09 ENCOUNTER — OFFICE VISIT (OUTPATIENT)
Dept: NEUROSURGERY | Age: 66
End: 2021-02-09
Payer: MEDICARE

## 2021-02-09 VITALS — BODY MASS INDEX: 19.29 KG/M2 | HEIGHT: 66 IN | WEIGHT: 120 LBS | TEMPERATURE: 98.2 F

## 2021-02-09 DIAGNOSIS — S06.5XAA SUBDURAL HEMATOMA: ICD-10-CM

## 2021-02-09 DIAGNOSIS — S06.5XAA SUBDURAL HEMATOMA: Primary | ICD-10-CM

## 2021-02-09 PROCEDURE — 70450 CT HEAD/BRAIN W/O DYE: CPT

## 2021-02-09 PROCEDURE — 4040F PNEUMOC VAC/ADMIN/RCVD: CPT | Performed by: PHYSICIAN ASSISTANT

## 2021-02-09 PROCEDURE — G8420 CALC BMI NORM PARAMETERS: HCPCS | Performed by: PHYSICIAN ASSISTANT

## 2021-02-09 PROCEDURE — G8427 DOCREV CUR MEDS BY ELIG CLIN: HCPCS | Performed by: PHYSICIAN ASSISTANT

## 2021-02-09 PROCEDURE — 1090F PRES/ABSN URINE INCON ASSESS: CPT | Performed by: PHYSICIAN ASSISTANT

## 2021-02-09 PROCEDURE — 3017F COLORECTAL CA SCREEN DOC REV: CPT | Performed by: PHYSICIAN ASSISTANT

## 2021-02-09 PROCEDURE — 1123F ACP DISCUSS/DSCN MKR DOCD: CPT | Performed by: PHYSICIAN ASSISTANT

## 2021-02-09 PROCEDURE — G8484 FLU IMMUNIZE NO ADMIN: HCPCS | Performed by: PHYSICIAN ASSISTANT

## 2021-02-09 PROCEDURE — 1036F TOBACCO NON-USER: CPT | Performed by: PHYSICIAN ASSISTANT

## 2021-02-09 PROCEDURE — 99214 OFFICE O/P EST MOD 30 MIN: CPT | Performed by: PHYSICIAN ASSISTANT

## 2021-02-09 PROCEDURE — G8400 PT W/DXA NO RESULTS DOC: HCPCS | Performed by: PHYSICIAN ASSISTANT

## 2021-02-09 NOTE — PROGRESS NOTES
Post Operative Follow-up    Patient is status post: left bethany hole craniotomy. Back at her baseline confusion/dementia per her daughter. Physical Exam  Alert and Oriented to self only  PERJOYCE RICHMOND 4/5. FC  Wound: C/D/I    A/P: patient is s/p left bethany hole craniotomy 3 months ago. Head CT shows left SDH that is reduced in size. F/u in 2 months with head CT. No OAC or AP medications.

## 2021-04-13 ENCOUNTER — OFFICE VISIT (OUTPATIENT)
Dept: NEUROSURGERY | Age: 66
End: 2021-04-13
Payer: MEDICARE

## 2021-04-13 ENCOUNTER — HOSPITAL ENCOUNTER (OUTPATIENT)
Dept: CT IMAGING | Age: 66
Discharge: HOME | End: 2021-04-15
Payer: MEDICARE

## 2021-04-13 VITALS
DIASTOLIC BLOOD PRESSURE: 74 MMHG | WEIGHT: 120 LBS | HEIGHT: 67 IN | SYSTOLIC BLOOD PRESSURE: 114 MMHG | BODY MASS INDEX: 18.83 KG/M2 | HEART RATE: 43 BPM

## 2021-04-13 DIAGNOSIS — S06.5XAA SUBDURAL HEMATOMA: ICD-10-CM

## 2021-04-13 DIAGNOSIS — S06.5XAA SUBDURAL HEMATOMA: Primary | ICD-10-CM

## 2021-04-13 PROCEDURE — G8420 CALC BMI NORM PARAMETERS: HCPCS | Performed by: PHYSICIAN ASSISTANT

## 2021-04-13 PROCEDURE — 1036F TOBACCO NON-USER: CPT | Performed by: PHYSICIAN ASSISTANT

## 2021-04-13 PROCEDURE — 4040F PNEUMOC VAC/ADMIN/RCVD: CPT | Performed by: PHYSICIAN ASSISTANT

## 2021-04-13 PROCEDURE — 70450 CT HEAD/BRAIN W/O DYE: CPT

## 2021-04-13 PROCEDURE — G8427 DOCREV CUR MEDS BY ELIG CLIN: HCPCS | Performed by: PHYSICIAN ASSISTANT

## 2021-04-13 PROCEDURE — 3017F COLORECTAL CA SCREEN DOC REV: CPT | Performed by: PHYSICIAN ASSISTANT

## 2021-04-13 PROCEDURE — 99213 OFFICE O/P EST LOW 20 MIN: CPT | Performed by: PHYSICIAN ASSISTANT

## 2021-04-13 PROCEDURE — G8400 PT W/DXA NO RESULTS DOC: HCPCS | Performed by: PHYSICIAN ASSISTANT

## 2021-04-13 PROCEDURE — 1090F PRES/ABSN URINE INCON ASSESS: CPT | Performed by: PHYSICIAN ASSISTANT

## 2021-04-13 PROCEDURE — 1123F ACP DISCUSS/DSCN MKR DOCD: CPT | Performed by: PHYSICIAN ASSISTANT

## 2021-04-13 RX ORDER — LACOSAMIDE 50 MG
1 TABLET ORAL 2 TIMES DAILY
COMMUNITY
Start: 2021-01-20

## 2021-06-18 ENCOUNTER — HOSPITAL ENCOUNTER (INPATIENT)
Age: 66
LOS: 7 days | Discharge: NURSING, REHAB OR LONG-TERM CARE | DRG: 193 | End: 2021-06-25
Attending: INTERNAL MEDICINE | Admitting: INTERNAL MEDICINE
Payer: MEDICARE

## 2021-06-18 PROBLEM — J18.9 PNEUMONIA: Status: ACTIVE | Noted: 2021-06-18

## 2021-06-18 PROCEDURE — 1200000000 HC SEMI PRIVATE

## 2021-06-18 ASSESSMENT — PAIN SCALES - PAIN ASSESSMENT IN ADVANCED DEMENTIA (PAINAD)
BREATHING: 0
BODYLANGUAGE: 0
NEGVOCALIZATION: 0
CONSOLABILITY: 0
TOTALSCORE: 0
FACIALEXPRESSION: 0

## 2021-06-18 ASSESSMENT — PAIN SCALES - GENERAL: PAINLEVEL_OUTOF10: 0

## 2021-06-18 NOTE — LETTER
[place] Department Medicaid  CERTIFICATION OF NECESSITY  FOR NON-EMERGENCY TRANSPORTATION   BY GROUND AMBULANCE      Individual Information   1. Name: Jayro Brown 2. [place] Medicaid Billing Number:    3. Address: [address] [facility] [address] [address]      Transportation Provider Information   4. Provider Name:    5. [place] Medicaid Provider Number:  National Provider Identifier (NPI):      Certification  7. Criteria:  During transport, this individual requires:  [x] Medical treatment or continuous     supervision by an EMT. [] The administration or regulation of oxygen by another person. [] Supervised protective restraint. 8. Period Beginning Date: 2021     9. Length  [x] Not more than 1 day(s)  [] One Year     Additional Information Relevant to Certification   10. Comments or Explanations, If Necessary or Appropriate     DEMENTIA   SACRAL WOUND   A & O X 0     Certifying Practitioner Information   11. Name of Practitioner: Nathaniel Hathaway DO   12. [place] Medicaid Provider Number, If Applicable:  [address] Provider Identifier (NPI):      Signature Information   14. Date of Signature: 2021  15. Name of Person Signing: Susana Garza RN    16. Signature and Professional Designation: 2021 Susana Garza RN [id number] [facility] [id number]  Rev. 2015       [address] Encounter Date/Time: 2021 [address] [place] [address] Account: [de-identified]    MRN: [id number]    Patient: Jayro Brown    Contact Serial #: [id number]      ENCOUNTER          Patient Class: I Private Enc? No Unit RM BD: [facility] Service:  INM   Encounter DX: Pneumonia [J18.9]   ADM Provider: Srinivas Barksdale MD   Procedure:     ATT Provider: Efren Cavanaugh DO   REF Provider: Igor Carpio      Admission DX: Pneumonia and DX codes: J18.9      PATIENT                 Name: Jayro Brown : 1955 (72 yrs)   Address: [address] [place] [address] EXT Sex: Female   [place] city: [address] [place]         [facility] Status:    Employer: [employer]         Restoration: Mosque   Primary Care Provider: Brian Shook MD         Primary Phone: [phone]   EMERGENCY CONTACT   Contact Name Legal Guardian? Relationship to Patient Home Phone Work Phone   1. Zonia Patino      Child  Brother/Sister [phone]              GUARANTOR            Guarantor: Tressa Dubose     : 1955   Address: [address] [place] [address] Sex: Female     [place] [id number]     Relation to Patient: Self       Home Phone: [phone]   Guarantor ID: [id number]       Work Phone:     Guarantor Employer: [employer]         Status: RETIRED      COVERAGE  PRIMARY INSURANCE   Payor: MEDICARE Plan: MEDICARE PART A AND B   Payor Address: [facility] [id number] [address]       Group Number:   Insurance Type: INDEMNITY   Subscriber Name: Jossue Hobbs : 1955   Subscriber ID: [id number] Pat. Rel. to Sub: Self   SECONDARY INSURANCE   Payor: [facility] FOR YOU MEDICA* Plan: [facility] FOR YOU MEDICAID HMO   Payor Address:  [facility] [address] [place] [address]          Group Number:   Insurance Type: INDEMNITY   Subscriber Name: Jossue Hobbs : 1955   Subscriber ID: [id number] Pat.  Rel. to Sub: SELF

## 2021-06-19 ENCOUNTER — APPOINTMENT (OUTPATIENT)
Dept: ULTRASOUND IMAGING | Age: 66
DRG: 193 | End: 2021-06-19
Attending: INTERNAL MEDICINE
Payer: MEDICARE

## 2021-06-19 LAB
ADENOVIRUS BY PCR: NOT DETECTED
ANION GAP SERPL CALCULATED.3IONS-SCNC: 8 MMOL/L (ref 7–16)
ANISOCYTOSIS: ABNORMAL
BASOPHILS ABSOLUTE: 0.03 E9/L (ref 0–0.2)
BASOPHILS RELATIVE PERCENT: 0.4 % (ref 0–2)
BORDETELLA PARAPERTUSSIS BY PCR: NOT DETECTED
BORDETELLA PERTUSSIS BY PCR: NOT DETECTED
BUN BLDV-MCNC: 18 MG/DL (ref 6–23)
BURR CELLS: ABNORMAL
CALCIUM SERPL-MCNC: 9.1 MG/DL (ref 8.6–10.2)
CHLAMYDOPHILIA PNEUMONIAE BY PCR: NOT DETECTED
CHLORIDE BLD-SCNC: 109 MMOL/L (ref 98–107)
CO2: 25 MMOL/L (ref 22–29)
CORONAVIRUS 229E BY PCR: NOT DETECTED
CORONAVIRUS HKU1 BY PCR: NOT DETECTED
CORONAVIRUS NL63 BY PCR: NOT DETECTED
CORONAVIRUS OC43 BY PCR: NOT DETECTED
CREAT SERPL-MCNC: 0.6 MG/DL (ref 0.5–1)
EOSINOPHILS ABSOLUTE: 0 E9/L (ref 0.05–0.5)
EOSINOPHILS RELATIVE PERCENT: 0 % (ref 0–6)
GFR AFRICAN AMERICAN: >60
GFR NON-AFRICAN AMERICAN: >60 ML/MIN/1.73
GLUCOSE BLD-MCNC: 87 MG/DL (ref 74–99)
HCT VFR BLD CALC: 33.2 % (ref 34–48)
HEMOGLOBIN: 10.6 G/DL (ref 11.5–15.5)
HUMAN METAPNEUMOVIRUS BY PCR: NOT DETECTED
HUMAN RHINOVIRUS/ENTEROVIRUS BY PCR: NOT DETECTED
IMMATURE GRANULOCYTES #: 0.02 E9/L
IMMATURE GRANULOCYTES %: 0.3 % (ref 0–5)
INFLUENZA A BY PCR: NOT DETECTED
INFLUENZA B BY PCR: NOT DETECTED
LACTIC ACID: 2.3 MMOL/L (ref 0.5–2.2)
LYMPHOCYTES ABSOLUTE: 0.79 E9/L (ref 1.5–4)
LYMPHOCYTES RELATIVE PERCENT: 11 % (ref 20–42)
MAGNESIUM: 1.8 MG/DL (ref 1.6–2.6)
MCH RBC QN AUTO: 31.3 PG (ref 26–35)
MCHC RBC AUTO-ENTMCNC: 31.9 % (ref 32–34.5)
MCV RBC AUTO: 97.9 FL (ref 80–99.9)
MONOCYTES ABSOLUTE: 0.18 E9/L (ref 0.1–0.95)
MONOCYTES RELATIVE PERCENT: 2.5 % (ref 2–12)
MYCOPLASMA PNEUMONIAE BY PCR: NOT DETECTED
NEUTROPHILS ABSOLUTE: 6.17 E9/L (ref 1.8–7.3)
NEUTROPHILS RELATIVE PERCENT: 85.8 % (ref 43–80)
OVALOCYTES: ABNORMAL
PARAINFLUENZA VIRUS 1 BY PCR: NOT DETECTED
PARAINFLUENZA VIRUS 2 BY PCR: NOT DETECTED
PARAINFLUENZA VIRUS 3 BY PCR: NOT DETECTED
PARAINFLUENZA VIRUS 4 BY PCR: NOT DETECTED
PDW BLD-RTO: 15.8 FL (ref 11.5–15)
PLATELET # BLD: 105 E9/L (ref 130–450)
PMV BLD AUTO: 12.7 FL (ref 7–12)
POIKILOCYTES: ABNORMAL
POTASSIUM REFLEX MAGNESIUM: 4.7 MMOL/L (ref 3.5–5)
RBC # BLD: 3.39 E12/L (ref 3.5–5.5)
RESPIRATORY SYNCYTIAL VIRUS BY PCR: NOT DETECTED
SARS-COV-2, PCR: NOT DETECTED
SODIUM BLD-SCNC: 142 MMOL/L (ref 132–146)
WBC # BLD: 7.2 E9/L (ref 4.5–11.5)

## 2021-06-19 PROCEDURE — 83735 ASSAY OF MAGNESIUM: CPT

## 2021-06-19 PROCEDURE — 6360000002 HC RX W HCPCS: Performed by: FAMILY MEDICINE

## 2021-06-19 PROCEDURE — 2500000003 HC RX 250 WO HCPCS: Performed by: FAMILY MEDICINE

## 2021-06-19 PROCEDURE — 83605 ASSAY OF LACTIC ACID: CPT

## 2021-06-19 PROCEDURE — 36415 COLL VENOUS BLD VENIPUNCTURE: CPT

## 2021-06-19 PROCEDURE — 80048 BASIC METABOLIC PNL TOTAL CA: CPT

## 2021-06-19 PROCEDURE — 0202U NFCT DS 22 TRGT SARS-COV-2: CPT

## 2021-06-19 PROCEDURE — 1200000000 HC SEMI PRIVATE

## 2021-06-19 PROCEDURE — 87040 BLOOD CULTURE FOR BACTERIA: CPT

## 2021-06-19 PROCEDURE — 2580000003 HC RX 258: Performed by: FAMILY MEDICINE

## 2021-06-19 PROCEDURE — 94640 AIRWAY INHALATION TREATMENT: CPT

## 2021-06-19 PROCEDURE — 99221 1ST HOSP IP/OBS SF/LOW 40: CPT | Performed by: PSYCHIATRY & NEUROLOGY

## 2021-06-19 PROCEDURE — 6370000000 HC RX 637 (ALT 250 FOR IP): Performed by: FAMILY MEDICINE

## 2021-06-19 PROCEDURE — 85025 COMPLETE CBC W/AUTO DIFF WBC: CPT

## 2021-06-19 PROCEDURE — 76705 ECHO EXAM OF ABDOMEN: CPT

## 2021-06-19 PROCEDURE — 80177 DRUG SCRN QUAN LEVETIRACETAM: CPT

## 2021-06-19 RX ORDER — LEVETIRACETAM 500 MG/1
750 TABLET ORAL 2 TIMES DAILY
Status: DISCONTINUED | OUTPATIENT
Start: 2021-06-19 | End: 2021-06-25 | Stop reason: HOSPADM

## 2021-06-19 RX ORDER — 0.9 % SODIUM CHLORIDE 0.9 %
500 INTRAVENOUS SOLUTION INTRAVENOUS ONCE
Status: COMPLETED | OUTPATIENT
Start: 2021-06-19 | End: 2021-06-19

## 2021-06-19 RX ORDER — LEVOFLOXACIN 5 MG/ML
750 INJECTION, SOLUTION INTRAVENOUS EVERY 24 HOURS
Status: DISCONTINUED | OUTPATIENT
Start: 2021-06-19 | End: 2021-06-25 | Stop reason: HOSPADM

## 2021-06-19 RX ORDER — LORAZEPAM 1 MG/1
1 TABLET ORAL 2 TIMES DAILY PRN
Status: DISCONTINUED | OUTPATIENT
Start: 2021-06-19 | End: 2021-06-25 | Stop reason: HOSPADM

## 2021-06-19 RX ORDER — ACETAMINOPHEN 650 MG/1
650 SUPPOSITORY RECTAL EVERY 6 HOURS PRN
Status: DISCONTINUED | OUTPATIENT
Start: 2021-06-19 | End: 2021-06-25 | Stop reason: HOSPADM

## 2021-06-19 RX ORDER — SODIUM CHLORIDE 0.9 % (FLUSH) 0.9 %
5-40 SYRINGE (ML) INJECTION PRN
Status: DISCONTINUED | OUTPATIENT
Start: 2021-06-19 | End: 2021-06-25 | Stop reason: HOSPADM

## 2021-06-19 RX ORDER — LACOSAMIDE 100 MG/1
50 TABLET ORAL 2 TIMES DAILY
Status: DISCONTINUED | OUTPATIENT
Start: 2021-06-19 | End: 2021-06-25 | Stop reason: HOSPADM

## 2021-06-19 RX ORDER — SODIUM CHLORIDE 9 MG/ML
25 INJECTION, SOLUTION INTRAVENOUS PRN
Status: DISCONTINUED | OUTPATIENT
Start: 2021-06-19 | End: 2021-06-25 | Stop reason: HOSPADM

## 2021-06-19 RX ORDER — SODIUM CHLORIDE 9 MG/ML
INJECTION, SOLUTION INTRAVENOUS CONTINUOUS
Status: DISCONTINUED | OUTPATIENT
Start: 2021-06-19 | End: 2021-06-25 | Stop reason: HOSPADM

## 2021-06-19 RX ORDER — ONDANSETRON 2 MG/ML
4 INJECTION INTRAMUSCULAR; INTRAVENOUS EVERY 6 HOURS PRN
Status: DISCONTINUED | OUTPATIENT
Start: 2021-06-19 | End: 2021-06-25 | Stop reason: HOSPADM

## 2021-06-19 RX ORDER — MEMANTINE HYDROCHLORIDE 10 MG/1
10 TABLET ORAL 2 TIMES DAILY
Status: DISCONTINUED | OUTPATIENT
Start: 2021-06-19 | End: 2021-06-25 | Stop reason: HOSPADM

## 2021-06-19 RX ORDER — SODIUM CHLORIDE 0.9 % (FLUSH) 0.9 %
5-40 SYRINGE (ML) INJECTION EVERY 12 HOURS SCHEDULED
Status: DISCONTINUED | OUTPATIENT
Start: 2021-06-19 | End: 2021-06-25 | Stop reason: HOSPADM

## 2021-06-19 RX ORDER — ATORVASTATIN CALCIUM 80 MG/1
80 TABLET, FILM COATED ORAL NIGHTLY
Status: DISCONTINUED | OUTPATIENT
Start: 2021-06-19 | End: 2021-06-25 | Stop reason: HOSPADM

## 2021-06-19 RX ORDER — ONDANSETRON 4 MG/1
4 TABLET, ORALLY DISINTEGRATING ORAL EVERY 8 HOURS PRN
Status: DISCONTINUED | OUTPATIENT
Start: 2021-06-19 | End: 2021-06-25 | Stop reason: HOSPADM

## 2021-06-19 RX ORDER — IPRATROPIUM BROMIDE AND ALBUTEROL SULFATE 2.5; .5 MG/3ML; MG/3ML
1 SOLUTION RESPIRATORY (INHALATION)
Status: DISCONTINUED | OUTPATIENT
Start: 2021-06-19 | End: 2021-06-25 | Stop reason: HOSPADM

## 2021-06-19 RX ORDER — ACETAMINOPHEN 325 MG/1
650 TABLET ORAL EVERY 6 HOURS PRN
Status: DISCONTINUED | OUTPATIENT
Start: 2021-06-19 | End: 2021-06-25 | Stop reason: HOSPADM

## 2021-06-19 RX ORDER — POLYETHYLENE GLYCOL 3350 17 G/17G
17 POWDER, FOR SOLUTION ORAL DAILY PRN
Status: DISCONTINUED | OUTPATIENT
Start: 2021-06-19 | End: 2021-06-25 | Stop reason: HOSPADM

## 2021-06-19 RX ORDER — DONEPEZIL HYDROCHLORIDE 5 MG/1
10 TABLET, FILM COATED ORAL NIGHTLY
Status: DISCONTINUED | OUTPATIENT
Start: 2021-06-19 | End: 2021-06-25 | Stop reason: HOSPADM

## 2021-06-19 RX ADMIN — LEVETIRACETAM 750 MG: 500 TABLET, FILM COATED ORAL at 10:52

## 2021-06-19 RX ADMIN — MEMANTINE HYDROCHLORIDE 10 MG: 10 TABLET, FILM COATED ORAL at 10:52

## 2021-06-19 RX ADMIN — Medication 10 ML: at 22:55

## 2021-06-19 RX ADMIN — LACOSAMIDE 50 MG: 100 TABLET, FILM COATED ORAL at 22:52

## 2021-06-19 RX ADMIN — DONEPEZIL HYDROCHLORIDE 10 MG: 5 TABLET, FILM COATED ORAL at 23:45

## 2021-06-19 RX ADMIN — LACOSAMIDE 50 MG: 100 TABLET, FILM COATED ORAL at 10:52

## 2021-06-19 RX ADMIN — SODIUM CHLORIDE: 9 INJECTION, SOLUTION INTRAVENOUS at 06:41

## 2021-06-19 RX ADMIN — SODIUM CHLORIDE 500 ML: 9 INJECTION, SOLUTION INTRAVENOUS at 01:07

## 2021-06-19 RX ADMIN — IPRATROPIUM BROMIDE AND ALBUTEROL SULFATE 1 AMPULE: .5; 3 SOLUTION RESPIRATORY (INHALATION) at 20:38

## 2021-06-19 RX ADMIN — IPRATROPIUM BROMIDE AND ALBUTEROL SULFATE 1 AMPULE: .5; 3 SOLUTION RESPIRATORY (INHALATION) at 16:21

## 2021-06-19 RX ADMIN — LEVETIRACETAM 750 MG: 500 TABLET, FILM COATED ORAL at 22:53

## 2021-06-19 RX ADMIN — LORAZEPAM 1 MG: 1 TABLET ORAL at 22:54

## 2021-06-19 RX ADMIN — SODIUM CHLORIDE: 9 INJECTION, SOLUTION INTRAVENOUS at 16:00

## 2021-06-19 RX ADMIN — MEMANTINE HYDROCHLORIDE 10 MG: 10 TABLET, FILM COATED ORAL at 23:46

## 2021-06-19 RX ADMIN — IPRATROPIUM BROMIDE AND ALBUTEROL SULFATE 1 AMPULE: .5; 3 SOLUTION RESPIRATORY (INHALATION) at 11:41

## 2021-06-19 RX ADMIN — LEVOFLOXACIN 750 MG: 5 INJECTION, SOLUTION INTRAVENOUS at 18:56

## 2021-06-19 RX ADMIN — METRONIDAZOLE 500 MG: 500 INJECTION, SOLUTION INTRAVENOUS at 06:43

## 2021-06-19 RX ADMIN — Medication 400 MG: at 10:52

## 2021-06-19 RX ADMIN — METRONIDAZOLE 500 MG: 500 INJECTION, SOLUTION INTRAVENOUS at 15:35

## 2021-06-19 RX ADMIN — ATORVASTATIN CALCIUM 80 MG: 80 TABLET, FILM COATED ORAL at 23:45

## 2021-06-19 RX ADMIN — IPRATROPIUM BROMIDE AND ALBUTEROL SULFATE 1 AMPULE: .5; 3 SOLUTION RESPIRATORY (INHALATION) at 09:00

## 2021-06-19 RX ADMIN — METRONIDAZOLE 500 MG: 500 INJECTION, SOLUTION INTRAVENOUS at 23:48

## 2021-06-19 ASSESSMENT — PAIN SCALES - PAIN ASSESSMENT IN ADVANCED DEMENTIA (PAINAD)
BREATHING: 0
CONSOLABILITY: 0
CONSOLABILITY: 0
TOTALSCORE: 0
FACIALEXPRESSION: 0
BODYLANGUAGE: 0
TOTALSCORE: 0
BODYLANGUAGE: 0
NEGVOCALIZATION: 0
BREATHING: 0
FACIALEXPRESSION: 0
NEGVOCALIZATION: 0

## 2021-06-19 ASSESSMENT — PAIN SCALES - GENERAL: PAINLEVEL_OUTOF10: 0

## 2021-06-19 NOTE — PROGRESS NOTES
Hospitalist Progress Note      PCP: No primary care provider on file. Date of Admission: 6/18/2021    Chief Complaint: Pneumonia leukopenia seizure    Hospital Course: 72 y.o. female with past medical history of dementia and seizures . Patient is a transfer from [facility] brought patient into the ED after a fall. Family was able to catch her and prevent  her from hitting her heed. They denied any seizure like activity , loss of consciousness . HPI limited due to dementia and patient is nonverbal  . EMS reported that patient 's oxygen saturation was 76% on room air . Patient was saturating 100% in ED  Laboratory data revealed mag 1.4 ,  CXR revealed bilateral opacities AST 41 ALT 51 WBC 1.7 PLPLT 99 Glucose 140 Trop 3 EKG sinus bradycardia . HGb 13.2 Patient was hypotensive in ED at 97/42 .  CTA/P revealed bilateral infiltrate both lungs and possible enteritis and acute  cholecystitis not excluded     Subjective: Seen and examined by me personally she is doing clinically better  Was transferred here for pneumonia electrolyte abnormalities history of falls    Medications:  Reviewed    Infusion Medications    sodium chloride      sodium chloride 75 mL/hr at 06/19/21 0641     Scheduled Medications    atorvastatin  80 mg Per NG tube Nightly    donepezil  10 mg Oral Nightly    levETIRAcetam  750 mg Oral BID    memantine  10 mg Oral BID    lacosamide  50 mg Oral BID    sodium chloride flush  5-40 mL Intravenous 2 times per day    [START ON 6/20/2021] enoxaparin  40 mg Subcutaneous Daily    ipratropium-albuterol  1 ampule Inhalation Q4H WA    levofloxacin  750 mg Intravenous Q24H    metroNIDAZOLE  500 mg Intravenous Q8H    magnesium oxide  400 mg Oral Daily     PRN Meds: LORazepam, sodium chloride flush, sodium chloride, ondansetron **OR** ondansetron, polyethylene glycol, acetaminophen **OR** acetaminophen      Intake/Output Summary (Last 24 hours) at 6/19/2021 3507  Last data filed at 6/19/2021 0643  Gross per 24 hour   Intake 0 ml   Output    Net 0 ml       Exam:    /63   Pulse 66   Temp 98.2 °F (36.8 °C) (Temporal)   Resp 16   Ht 5' 5\" (1.651 m)   Wt 116 lb (52.6 kg)   SpO2 92%   BMI 19.30 kg/m²     General appearance: No apparent distress, appears stated age and cooperative. HEENT: Pupils equal, round, and reactive to light. Conjunctivae/corneas clear. Neck: Supple, with full range of motion. No jugular venous distention. Trachea midline. Respiratory:  Normal respiratory effort. Clear to auscultation, bilaterally without Rales/Wheezes/Rhonchi. Cardiovascular: Regular rate and rhythm with normal S1/S2 without murmurs, rubs or gallops. Abdomen: Soft, non-tender, non-distended with normal bowel sounds. Musculoskeletal: No clubbing, cyanosis or edema bilaterally. Full range of motion without deformity. Skin: Skin color, texture, turgor normal.  No rashes or lesions. Neurologic:  Neurovascularly intact without any focal sensory/motor deficits. Cranial nerves: II-XII intact, grossly non-focal.  Psychiatric: Alert and oriented, thought content appropriate, normal insight    Labs:   Recent Labs     06/19/21  0720   WBC 7.2   HGB 10.6*   HCT 33.2*   *     Recent Labs     06/19/21  0720      K 4.7   *   CO2 25   BUN 18   CREATININE 0.6   CALCIUM 9.1     No results for input(s): AST, ALT, BILIDIR, BILITOT, ALKPHOS in the last 72 hours. No results for input(s): INR in the last 72 hours. No results for input(s): Agus Snowball in the last 72 hours. Assessment/Plan:    Active Hospital Problems    Diagnosis Date Noted    Pneumonia [J18.9] 06/18/2021     Community Acquired Pneumonia  patient is a transfer from [place] [address] and CT revealed bilateral opacities . Patient was saturating >90% on room air on admission .    She received Rocephin in ER  Continued on IV Levaquin   blood cultures Legionella Strep Procal Lactic acid Resp panel    IVF  Swallow evaluation      Acute Enteritis :   as on CT on admission . Levaquin and added on Flagyl . CT mentioned that cholecystitis    ordered RUQ US   Impression   1.  Cholelithiasis.  No biliary dilatation or findings of acute cholecystitis.       2.  Tiny free fluid within the right abdomen.       3.  Retroperitoneal cluster of small cysts measuring 2.8 cm in maximal   dimension posterior to the pancreas and anterior to the aorta.          Hypomagnesemia: 1.4 on admission   place on Mag and replete as need       Leukopenia :   WBC was 1.7 may be due to infection    Neutrophils were 1.4    Hematology consulted      History of Seizures :   C/w Vimpat Keppra Due to Fall prior  to admission concerned if this was related to seizure  will consult neurology      Dementia: C/w Aricept and Namenda       DVT Prophylaxis: lovenox   Diet: ADULT DIET;  Regular  Code Status: Full Code    PT/OT Eval Status: PT/OT    Dispo -based on clinical improvement    Celso Bhat MD

## 2021-06-19 NOTE — H&P
Hospital Medicine History & Physical      PCP: No primary care provider on file. Date of Admission: 6/18/2021    Date of Service: Pt seen/examined on 6/19/2021  and Admitted to Inpatient with expected LOS greater than two midnights due to medical therapy. Chief Complaint:  Fall       History Of Present Illness:    72 y.o. female with past medical history of dementia and seizures . Patient is a transfer from [facility] brought patient into the ED after a fall. Family was able to catch her and prevent  her from hitting her heed. They denied any seizure like activity , loss of consciousness . HPI limited due to dementia and patient is nonverbal  . EMS reported that patient 's oxygen saturation was 76% on room air . Patient was saturating 100% in ED  Laboratory data revealed mag 1.4 ,  CXR revealed bilateral opacities AST 41 ALT 51 WBC 1.7 PLPLT 99 Glucose 140 Trop 3 EKG sinus bradycardia . HGb 13.2 Patient was hypotensive in ED at 97/42 . CTA/P revealed bilateral infiltrate both lungs and possible enteritis and acute  cholecystitis not excluded   Past Medical History:          Diagnosis Date    [facility]     [facility] ([facility]     [facility]dural hematoma ([facility]        Past Surgical History:          Procedure Laterality Date    CRANIOTOMY Left 11/18/2020    CRANIOTOMY BRANDIE HOLES performed by Ulises Peraza MD at [facility] OR       Medications Prior to Admission:      Prior to Admission medications    Medication Sig Start Date End Date Taking? Authorizing Provider   VIMPAT 50 MG TABS tablet Take 1 tablet by mouth 2 times daily.  1/20/21   Historical Provider, MD   Cholecalciferol (VITAMIN D3) 125 MCG (5000 UT) TABS Take by mouth    Historical Provider, MD   atorvastatin (LIPITOR) 80 MG tablet 1 tablet by Per NG tube route nightly 11/30/20   MINA Rojo - NP   levETIRAcetam (KEPPRA) 750 MG tablet Take 750 mg by mouth 2 times daily    Historical Provider, MD   LORazepam (ATIVAN) 1 MG tablet Take 1 mg by mouth 2 times daily as needed for Anxiety. Historical Provider, MD   memantine (NAMENDA) 10 MG tablet Take 10 mg by mouth 2 times daily    Historical Provider, MD   donepezil (ARICEPT) 10 MG tablet Take 10 mg by mouth nightly    Historical Provider, MD       Allergies:  Morphine    Social History:      The patient currently lives with family     TOBACCO:   reports that she quit smoking about 25 years ago. Her smoking use included cigarettes. She has a 20.00 pack-year smoking history. She has never used smokeless tobacco.  ETOH:   reports previous alcohol use. Family History:       Reviewed in detail and negative for DM, CAD, Cancer, CVA. Positive as follows:    History reviewed. No pertinent family history. REVIEW OF SYSTEMS:  Unable to obatin patient is nonverbal     PHYSICAL EXAM:    /60   Pulse 68   Temp 98.4 °F (36.9 °C)   Resp 16   SpO2 96%     General appearance:  No apparent distress, appears stated age and cooperative. HEENT:  Normal cephalic, atraumatic without obvious deformity. Pupils equal, round, and reactive to light. Extra ocular muscles intact. Conjunctivae/corneas clear. Neck: Supple, with full range of motion. No jugular venous distention. Trachea midline. Respiratory:  Normal respiratory effort. Clear to auscultation, bilaterally without Rales/Wheezes/Rhonchi. Cardiovascular:  Regular rate and rhythm with normal S1/S2 without murmurs, rubs or gallops. Abdomen: Soft, non-tender, non-distended with normal bowel sounds. Musculoskeletal:  No clubbing, cyanosis or edema bilaterally. Full range of motion without deformity. Skin: Skin color, texture, turgor normal.  No rashes or lesions. Neurologic:  Neurovascularly intact without any focal sensory/motor deficits. Cranial nerves: II-XII intact, grossly non-focal.  Psychiatric:  Alert patient is nonverbal , Labs:     No results for input(s): WBC, HGB, HCT, PLT in the last 72 hours.   No results for input(s): NA, K, CL, CO2, BUN, CREATININE, CALCIUM, PHOS in the last 72 hours. Invalid input(s): MAGNES  No results for input(s): AST, ALT, BILIDIR, BILITOT, ALKPHOS in the last 72 hours. No results for input(s): INR in the last 72 hours. No results for input(s): Verlena Kolby in the last 72 hours. Urinalysis:    No results found for: Juventino Bomaria victoriadon, BACTERIA, RBCUA, BLOODU, SPECGRAV, GLUCOSEU      ASSESSMENT:    Active Hospital Problems    Diagnosis Date Noted    Pneumonia [J18.9] 06/18/2021       PLAN:    Community Acquired Pneumonia:patient is a transfer from [place] [address] and CT revealed bilateral opacities . Patient was saturating >90% on room air on admission . She received Rocephin in ED . Admit inpatient vitals telemetry IV Levaquin blood cultures Legionella Strep Procal Lactic acid Resp panel  IVF  Swallow evaluation         Acute Enteritis : as on CT on admission . C/we Levaquin and added on Flagyl .  CT mentioned that cholecystitis cannot be excluded ordered RUQ US     Hypomagnesemia: 1.4 on admission , place on Mag and replete as need      Leukopenia : WBC was 1.7 may be due to infection  Neutrophils were 1.4  Hematology consulted     History of Seizures : C/w Vimpat Keppra Due to Fall prior  to admission concerned if this was related to seizure  will consult neurology     Dementia: C/w Aricept and Namenda          DVT Prophylaxis: Lovenox   Diet:Regular     Code Status: Full      Evelyn Wells MD

## 2021-06-19 NOTE — CONSULTS
Taking? Authorizing Provider   VIMPAT 50 MG TABS tablet Take 1 tablet by mouth 2 times daily. 1/20/21   Historical Provider, MD   Cholecalciferol (VITAMIN D3) 125 MCG (5000 UT) TABS Take by mouth    Historical Provider, MD   atorvastatin (LIPITOR) 80 MG tablet 1 tablet by Per NG tube route nightly 11/30/20   Collette Gibson, APRN - NP   levETIRAcetam (KEPPRA) 750 MG tablet Take 750 mg by mouth 2 times daily    Historical Provider, MD   LORazepam (ATIVAN) 1 MG tablet Take 1 mg by mouth 2 times daily as needed for Anxiety. Historical Provider, MD   memantine (NAMENDA) 10 MG tablet Take 10 mg by mouth 2 times daily    Historical Provider, MD   donepezil (ARICEPT) 10 MG tablet Take 10 mg by mouth nightly    Historical Provider, MD       Allergies  Allergies   Allergen Reactions    Morphine        Review of Systems: Unable to obtain patient with dementia and nonverbal           Objective  /63   Pulse 66   Temp 98.2 °F (36.8 °C) (Temporal)   Resp 16   SpO2 92%     Physical Exam:     General: Alert, in no acute distress,   Head and neck : PERRLA, EOMI . Sclera non icteric. Oropharynx : Clear  Neck: no JVD,  no adenopathy,   Heart: Regular rate and regular rhythm,   Lungs: Clear to auscultation   Extremities: No edema,   Abdomen: Soft, non-tender;no masses, no organomegaly  Skin:  No rash. Neurologic:Cranial nerves grossly intact.   Alert but nonverbal    Recent Laboratory Data-   Lab Results   Component Value Date    WBC 7.2 06/19/2021    HGB 10.6 (L) 06/19/2021    HCT 33.2 (L) 06/19/2021    MCV 97.9 06/19/2021     (L) 06/19/2021    LYMPHOPCT 11.0 (L) 06/19/2021    RBC 3.39 (L) 06/19/2021    MCH 31.3 06/19/2021    MCHC 31.9 (L) 06/19/2021    RDW 15.8 (H) 06/19/2021    NEUTOPHILPCT 85.8 (H) 06/19/2021    MONOPCT 2.5 06/19/2021    BASOPCT 0.4 06/19/2021    NEUTROABS 6.17 06/19/2021    LYMPHSABS 0.79 (L) 06/19/2021    MONOSABS 0.18 06/19/2021    EOSABS 0.00 (L) 06/19/2021    BASOSABS 0.03 06/19/2021 Lab Results   Component Value Date     06/19/2021    K 4.7 06/19/2021     (H) 06/19/2021    CO2 25 06/19/2021    BUN 18 06/19/2021    CREATININE 0.6 06/19/2021    GLUCOSE 87 06/19/2021    CALCIUM 9.1 06/19/2021    PROT 5.6 (L) 11/28/2020    LABALBU 2.9 (L) 11/28/2020    BILITOT 0.5 11/28/2020    ALKPHOS 60 11/28/2020    AST 25 11/28/2020    ALT 21 11/28/2020    LABGLOM >60 06/19/2021    GFRAA >60 06/19/2021       No results found for: IRON, TIBC, FERRITIN        Radiology-    [address] organ? GALLBLADDER, PANCREAS, LIVER    Result Date: 6/19/2021  EXAMINATION: RIGHT UPPER QUADRANT ULTRASOUND 6/19/2021 7:51 am COMPARISON: None. HISTORY: ORDERING SYSTEM PROVIDED HISTORY: Cholecystistis TECHNOLOGIST PROVIDED HISTORY: Reason for exam:->Cholecystistis Specify organ?->GALLBLADDER Specify organ?->PANCREAS Specify organ?->LIVER What reading provider will be dictating this exam?->CRC FINDINGS: The gallbladder is adequately distended and shows multiple intraluminal stones. No gallbladder wall thickening or pericholecystic edema. The common bile duct measures 0.4 cm in diameter which is normal.  Negative sonographic Uriarte sign. No intrahepatic biliary dilatation. Visualized portions of the liver are unremarkable. Tiny free fluid within the right abdomen. Pancreas: Thin body habitus and the pancreas is adequately visualized. The pancreatic duct is normal in caliber measuring 0.2 cm in diameter. The pancreas appears normal in overall size. Posterior to the pancreatic body and neck, a cluster of small cysts is present measuring 2.8 cm in length and 1.1 cm in short axis dimension. The cysts lie within the retroperitoneum and anterior to the aorta. The right kidney is normal in size measuring 12.6 cm in length. Right renal mild pelvicaliectasis without krysten hydronephrosis. 1.  Cholelithiasis. No biliary dilatation or findings of acute cholecystitis.  2.  Tiny free fluid within the right abdomen. 3.  Retroperitoneal cluster of small cysts measuring 2.8 cm in maximal dimension posterior to the pancreas and anterior to the aorta. ASSESSMENT/PLAN : 70-year-old woman with dementia  Status post recent fall    Chest x-ray from outside hospital reported bilateral infiltrates and CT scan of abdomen and pelvis also from outside hospital was suggestive of enteritis. She had leukopenia but her repeat CBC today shows normal WBC count. She has moderate normocytic anemia likely related to myelosuppression by her pneumonia and possible enteritis. Moderate thrombocytopenia likely related to myelosuppression by infection. Review peripheral blood smear and check iron studies and reticulocyte count  Continue present antibiotics. Natali Victoria. Stephanie Lara M.D., F.A.C.P.   Electronically signed 6/19/2021 at 12:19 PM

## 2021-06-19 NOTE — CONSULTS
Terence Mohamud is a 72 y.o. female       No chief complaint on file. CC: fall  HPI:    77-year-old woman with history of bilateral subdural hematomas, seizures, dementia nonverbal at baseline presents after a fall. Neurology was consulted for concern for seizure. Description of the event was not consistent with her typical seizures. No seizure activity was reported. CT scan of the head shows stable subdural hematomas. She was noted to be hypoxic on initial evaluation. Chest x-ray is reportedly showed bilateral consolidations. He was and moves the season possible demyelinating changes going down. 77-year-old    HPI going down the protein she does not  Prior to Visit Medications    Medication Sig Taking? Authorizing Provider   VIMPAT 50 MG TABS tablet Take 1 tablet by mouth 2 times daily. Historical Provider, MD   Cholecalciferol (VITAMIN D3) 125 MCG (5000 UT) TABS Take by mouth  Historical Provider, MD   atorvastatin (LIPITOR) 80 MG tablet 1 tablet by Per NG tube route nightly  MINA Paez NP   levETIRAcetam (KEPPRA) 750 MG tablet Take 750 mg by mouth 2 times daily  Historical Provider, MD   LORazepam (ATIVAN) 1 MG tablet Take 1 mg by mouth 2 times daily as needed for Anxiety. Historical Provider, MD   memantine (NAMENDA) 10 MG tablet Take 10 mg by mouth 2 times daily  Historical Provider, MD   donepezil (ARICEPT) 10 MG tablet Take 10 mg by mouth nightly  Historical Provider, MD     Social History     Tobacco Use    Smoking status: Former Smoker     Packs/day: 1.00     Years: 20.00     Pack years: 20.00     Types: Cigarettes     Quit date:      Years since quittin.4    Smokeless tobacco: Never Used   Substance Use Topics    Alcohol use: Not Currently    Drug use: Not on file     History reviewed. No pertinent family history.   Past Surgical History:   Procedure Laterality Date    CRANIOTOMY Left 2020    CRANIOTOMY BRANDIE HOLES performed by Jeremie Zamora MD at [address] Past Medical History:   Diagnosis Date    [facility]     [facility] ([facility]     Subdural hematoma ([facility])      Review of Systems      Unable to obtain pt is nonverbal       Objective:   /63   Pulse 66   Temp 98.2 °F (36.8 °C) (Temporal)   Resp 16   SpO2 92%     Physical Exam    Laying in bed eyes closed. Open initially but then closes with forced eye closure. Decreased bulk throughout tone increases. Pt keeps extremities flexed. Withdrawals extremities to stimuli. Neurological Exam    Laboratory/Radiology:     CBC with Differential:    Lab Results   Component Value Date    WBC 7.2 06/19/2021    RBC 3.39 06/19/2021    HGB 10.6 06/19/2021    HCT 33.2 06/19/2021     06/19/2021    MCV 97.9 06/19/2021    MCH 31.3 06/19/2021    MCHC 31.9 06/19/2021    RDW 15.8 06/19/2021    SEGSPCT 48 10/30/2013    LYMPHOPCT 11.0 06/19/2021    MONOPCT 2.5 06/19/2021    BASOPCT 0.4 06/19/2021    MONOSABS 0.18 06/19/2021    LYMPHSABS 0.79 06/19/2021    EOSABS 0.00 06/19/2021    BASOSABS 0.03 06/19/2021     CMP:    Lab Results   Component Value Date     06/19/2021    K 4.7 06/19/2021     06/19/2021    CO2 25 06/19/2021    BUN 18 06/19/2021    CREATININE 0.6 06/19/2021    GFRAA >60 06/19/2021    LABGLOM >60 06/19/2021    GLUCOSE 87 06/19/2021    GLUCOSE 105 05/04/2012    PROT 5.6 11/28/2020    LABALBU 2.9 11/28/2020    LABALBU 4.4 05/04/2012    CALCIUM 9.1 06/19/2021    BILITOT 0.5 11/28/2020    ALKPHOS 60 11/28/2020    AST 25 11/28/2020    ALT 21 11/28/2020     HgBA1c:  No results found for: LABA1C  TSH:    Lab Results   Component Value Date    TSH 1.270 07/25/2015       CT head reported negative. I was not able look at images    I independently reviewed the labs and imaging studies at today's appointment. Assessment:     Fall in setting of PNA and poor neurologic baseline. Plan:       Supportive care, treat underling infection. Monitor for return to baseline.  If no improvement may need MRI or EEG.      Ban Dickey MD  1:41 PM  6/19/2021

## 2021-06-20 LAB
ALBUMIN SERPL-MCNC: 2.4 G/DL (ref 3.5–5.2)
ALP BLD-CCNC: 105 U/L (ref 35–104)
ALT SERPL-CCNC: 31 U/L (ref 0–32)
ANION GAP SERPL CALCULATED.3IONS-SCNC: 5 MMOL/L (ref 7–16)
ANION GAP SERPL CALCULATED.3IONS-SCNC: 5 MMOL/L (ref 7–16)
ANISOCYTOSIS: ABNORMAL
AST SERPL-CCNC: 29 U/L (ref 0–31)
BASOPHILS ABSOLUTE: 0 E9/L (ref 0–0.2)
BASOPHILS RELATIVE PERCENT: 0.3 % (ref 0–2)
BILIRUB SERPL-MCNC: 0.5 MG/DL (ref 0–1.2)
BUN BLDV-MCNC: 14 MG/DL (ref 6–23)
BUN BLDV-MCNC: 14 MG/DL (ref 6–23)
CALCIUM SERPL-MCNC: 8.8 MG/DL (ref 8.6–10.2)
CALCIUM SERPL-MCNC: 9 MG/DL (ref 8.6–10.2)
CHLORIDE BLD-SCNC: 110 MMOL/L (ref 98–107)
CHLORIDE BLD-SCNC: 111 MMOL/L (ref 98–107)
CO2: 24 MMOL/L (ref 22–29)
CO2: 25 MMOL/L (ref 22–29)
CREAT SERPL-MCNC: 0.6 MG/DL (ref 0.5–1)
CREAT SERPL-MCNC: 0.6 MG/DL (ref 0.5–1)
EOSINOPHILS ABSOLUTE: 0.08 E9/L (ref 0.05–0.5)
EOSINOPHILS RELATIVE PERCENT: 0.9 % (ref 0–6)
GFR AFRICAN AMERICAN: >60
GFR AFRICAN AMERICAN: >60
GFR NON-AFRICAN AMERICAN: >60 ML/MIN/1.73
GFR NON-AFRICAN AMERICAN: >60 ML/MIN/1.73
GLUCOSE BLD-MCNC: 77 MG/DL (ref 74–99)
GLUCOSE BLD-MCNC: 81 MG/DL (ref 74–99)
HCT VFR BLD CALC: 28.1 % (ref 34–48)
HCT VFR BLD CALC: 29.4 % (ref 34–48)
HEMOGLOBIN: 8.8 G/DL (ref 11.5–15.5)
HEMOGLOBIN: 9.4 G/DL (ref 11.5–15.5)
LYMPHOCYTES ABSOLUTE: 0.27 E9/L (ref 1.5–4)
LYMPHOCYTES RELATIVE PERCENT: 2.6 % (ref 20–42)
MCH RBC QN AUTO: 31.4 PG (ref 26–35)
MCH RBC QN AUTO: 31.5 PG (ref 26–35)
MCHC RBC AUTO-ENTMCNC: 31.3 % (ref 32–34.5)
MCHC RBC AUTO-ENTMCNC: 32 % (ref 32–34.5)
MCV RBC AUTO: 100.4 FL (ref 80–99.9)
MCV RBC AUTO: 98.7 FL (ref 80–99.9)
MONOCYTES ABSOLUTE: 0.18 E9/L (ref 0.1–0.95)
MONOCYTES RELATIVE PERCENT: 1.7 % (ref 2–12)
NEUTROPHILS ABSOLUTE: 8.55 E9/L (ref 1.8–7.3)
NEUTROPHILS RELATIVE PERCENT: 94.8 % (ref 43–80)
PDW BLD-RTO: 15.5 FL (ref 11.5–15)
PDW BLD-RTO: 15.6 FL (ref 11.5–15)
PLATELET # BLD: 83 E9/L (ref 130–450)
PLATELET # BLD: 84 E9/L (ref 130–450)
PLATELET CONFIRMATION: NORMAL
PLATELET CONFIRMATION: NORMAL
PMV BLD AUTO: 11.9 FL (ref 7–12)
PMV BLD AUTO: 12.1 FL (ref 7–12)
POLYCHROMASIA: ABNORMAL
POTASSIUM REFLEX MAGNESIUM: 4 MMOL/L (ref 3.5–5)
POTASSIUM SERPL-SCNC: 4.1 MMOL/L (ref 3.5–5)
PROCALCITONIN: 11.28 NG/ML (ref 0–0.08)
RBC # BLD: 2.8 E12/L (ref 3.5–5.5)
RBC # BLD: 2.98 E12/L (ref 3.5–5.5)
SODIUM BLD-SCNC: 139 MMOL/L (ref 132–146)
SODIUM BLD-SCNC: 141 MMOL/L (ref 132–146)
TOTAL PROTEIN: 5.1 G/DL (ref 6.4–8.3)
WBC # BLD: 9 E9/L (ref 4.5–11.5)
WBC # BLD: 9.6 E9/L (ref 4.5–11.5)

## 2021-06-20 PROCEDURE — 84145 PROCALCITONIN (PCT): CPT

## 2021-06-20 PROCEDURE — 36415 COLL VENOUS BLD VENIPUNCTURE: CPT

## 2021-06-20 PROCEDURE — 80048 BASIC METABOLIC PNL TOTAL CA: CPT

## 2021-06-20 PROCEDURE — 80053 COMPREHEN METABOLIC PANEL: CPT

## 2021-06-20 PROCEDURE — 85025 COMPLETE CBC W/AUTO DIFF WBC: CPT

## 2021-06-20 PROCEDURE — 6370000000 HC RX 637 (ALT 250 FOR IP): Performed by: FAMILY MEDICINE

## 2021-06-20 PROCEDURE — 94640 AIRWAY INHALATION TREATMENT: CPT

## 2021-06-20 PROCEDURE — 6360000002 HC RX W HCPCS: Performed by: FAMILY MEDICINE

## 2021-06-20 PROCEDURE — 85027 COMPLETE CBC AUTOMATED: CPT

## 2021-06-20 PROCEDURE — 1200000000 HC SEMI PRIVATE

## 2021-06-20 PROCEDURE — 2500000003 HC RX 250 WO HCPCS: Performed by: FAMILY MEDICINE

## 2021-06-20 RX ADMIN — IPRATROPIUM BROMIDE AND ALBUTEROL SULFATE 1 AMPULE: .5; 3 SOLUTION RESPIRATORY (INHALATION) at 20:34

## 2021-06-20 RX ADMIN — ENOXAPARIN SODIUM 40 MG: 40 INJECTION SUBCUTANEOUS at 09:27

## 2021-06-20 RX ADMIN — LEVETIRACETAM 750 MG: 500 TABLET, FILM COATED ORAL at 20:06

## 2021-06-20 RX ADMIN — LEVETIRACETAM 750 MG: 500 TABLET, FILM COATED ORAL at 09:27

## 2021-06-20 RX ADMIN — LEVOFLOXACIN 750 MG: 5 INJECTION, SOLUTION INTRAVENOUS at 17:30

## 2021-06-20 RX ADMIN — Medication 400 MG: at 09:27

## 2021-06-20 RX ADMIN — MEMANTINE HYDROCHLORIDE 10 MG: 10 TABLET, FILM COATED ORAL at 20:07

## 2021-06-20 RX ADMIN — LACOSAMIDE 50 MG: 100 TABLET, FILM COATED ORAL at 20:06

## 2021-06-20 RX ADMIN — MEMANTINE HYDROCHLORIDE 10 MG: 10 TABLET, FILM COATED ORAL at 09:27

## 2021-06-20 RX ADMIN — DONEPEZIL HYDROCHLORIDE 10 MG: 5 TABLET, FILM COATED ORAL at 20:06

## 2021-06-20 RX ADMIN — ATORVASTATIN CALCIUM 80 MG: 80 TABLET, FILM COATED ORAL at 20:06

## 2021-06-20 RX ADMIN — IPRATROPIUM BROMIDE AND ALBUTEROL SULFATE 1 AMPULE: .5; 3 SOLUTION RESPIRATORY (INHALATION) at 17:01

## 2021-06-20 RX ADMIN — METRONIDAZOLE 500 MG: 500 INJECTION, SOLUTION INTRAVENOUS at 07:22

## 2021-06-20 RX ADMIN — LACOSAMIDE 50 MG: 100 TABLET, FILM COATED ORAL at 09:27

## 2021-06-20 RX ADMIN — METRONIDAZOLE 500 MG: 500 INJECTION, SOLUTION INTRAVENOUS at 15:45

## 2021-06-20 ASSESSMENT — PAIN SCALES - GENERAL
PAINLEVEL_OUTOF10: 0
PAINLEVEL_OUTOF10: 0

## 2021-06-20 NOTE — PLAN OF CARE
Ph conv w sister Tennis Flies x 2.  Margaddie Raspberry not wanting to go to sleep. Up to chair and commode x 2 until fatigued or when ativan had effect. 4 bites of food at supper. Had to be fed. Appears to like grape juice.    Sm amt of urine output while seated on commode

## 2021-06-20 NOTE — PROGRESS NOTES
Hospitalist Progress Note      PCP: No primary care provider on file. Date of Admission: 6/18/2021    Chief Complaint: Pneumonia leukopenia seizure    Hospital Course: 72 y.o. female with past medical history of dementia and seizures . Patient is a transfer from [facility] brought patient into the ED after a fall. Family was able to catch her and prevent  her from hitting her heed. They denied any seizure like activity , loss of consciousness . HPI limited due to dementia and patient is nonverbal  . EMS reported that patient 's oxygen saturation was 76% on room air . Patient was saturating 100% in ED  Laboratory data revealed mag 1.4 ,  CXR revealed bilateral opacities AST 41 ALT 51 WBC 1.7 PLPLT 99 Glucose 140 Trop 3 EKG sinus bradycardia . HGb 13.2 Patient was hypotensive in ED at 97/42 .  CTA/P revealed bilateral infiltrate both lungs and possible enteritis and acute  cholecystitis not excluded     Subjective: Seen and examined by me personally she is doing clinically better  Was transferred here for pneumonia electrolyte abnormalities history of falls    Medications:  Reviewed    Infusion Medications    sodium chloride      sodium chloride 75 mL/hr at 06/19/21 1600     Scheduled Medications    atorvastatin  80 mg Per NG tube Nightly    donepezil  10 mg Oral Nightly    levETIRAcetam  750 mg Oral BID    memantine  10 mg Oral BID    lacosamide  50 mg Oral BID    sodium chloride flush  5-40 mL Intravenous 2 times per day    enoxaparin  40 mg Subcutaneous Daily    ipratropium-albuterol  1 ampule Inhalation Q4H WA    levofloxacin  750 mg Intravenous Q24H    metroNIDAZOLE  500 mg Intravenous Q8H    magnesium oxide  400 mg Oral Daily     PRN Meds: LORazepam, sodium chloride flush, sodium chloride, ondansetron **OR** ondansetron, polyethylene glycol, acetaminophen **OR** acetaminophen      Intake/Output Summary (Last 24 hours) at 6/20/2021 1255  Last data filed at 6/19/2021 2200  Gross per 24 hour   Intake 120 ml   Output    Net 120 ml       Exam:    /63   Pulse 72   Temp 98.1 °F (36.7 °C) (Temporal)   Resp 16   Ht 5' 5\" (1.651 m)   Wt 116 lb (52.6 kg)   SpO2 97%   BMI 19.30 kg/m²     General appearance: No apparent distress, appears stated age and cooperative. HEENT: Pupils equal, round, and reactive to light. Conjunctivae/corneas clear. Neck: Supple, with full range of motion. No jugular venous distention. Trachea midline. Respiratory:  Normal respiratory effort. Clear to auscultation, bilaterally without Rales/Wheezes/Rhonchi. Cardiovascular: Regular rate and rhythm with normal S1/S2 without murmurs, rubs or gallops. Abdomen: Soft, non-tender, non-distended with normal bowel sounds. Musculoskeletal: No clubbing, cyanosis or edema bilaterally. Full range of motion without deformity. Skin: Skin color, texture, turgor normal.  No rashes or lesions. Neurologic:  Neurovascularly intact without any focal sensory/motor deficits. Cranial nerves: II-XII intact, grossly non-focal.  Psychiatric: Alert and oriented, thought content appropriate, normal insight    Labs:   Recent Labs     06/19/21  0720 06/20/21  0520   WBC 7.2 9.0   HGB 10.6* 9.4*   HCT 33.2* 29.4*   * 83*     Recent Labs     06/19/21  0720 06/20/21  0520    141   K 4.7 4.0   * 111*   CO2 25 25   BUN 18 14   CREATININE 0.6 0.6   CALCIUM 9.1 9.0     No results for input(s): AST, ALT, BILIDIR, BILITOT, ALKPHOS in the last 72 hours. No results for input(s): INR in the last 72 hours. No results for input(s): Umberto Holton in the last 72 hours. Assessment/Plan:    Active Hospital Problems    Diagnosis Date Noted    Pneumonia [J18.9] 06/18/2021     Community Acquired Pneumonia  patient is a transfer from [place] [address] and CT revealed bilateral opacities . Patient was saturating >90% on room air on admission .    She received Rocephin in ER  Continued on IV Levaquin   blood cultures Legionella Strep Procal Lactic acid Resp panel    IVF    Swallow evaluation      Acute Enteritis :   as on CT on admission . Levaquin and added on Flagyl . CT mentioned that cholecystitis   RUQ US   Impression   1.  Cholelithiasis.  No biliary dilatation or findings of acute cholecystitis.       2.  Tiny free fluid within the right abdomen.       3.  Retroperitoneal cluster of small cysts measuring 2.8 cm in maximal   dimension posterior to the pancreas and anterior to the aorta. Hypomagnesemia:   place on Mag and replete as need       Leukopenia per transferred Hospital:   No leukopenia on admission  WBC 9.0   hematology consult appreciated     History of Seizures :   C/w Vimpat, Keppra Due to Fall prior  to admission concerned if this was related to seizure  will consult neurology      Dementia: C/w Aricept and Namenda    Patient do not want to communicate  She does not want to eat  consult nutritionist    DVT Prophylaxis: lovenox   Diet: ADULT DIET;  Regular  Code Status: Full Code    PT/OT Eval Status: PT/OT    Dispo -based on clinical improvement    Nima Beltran MD

## 2021-06-21 PROBLEM — E44.0 MODERATE PROTEIN-CALORIE MALNUTRITION (HCC): Chronic | Status: ACTIVE | Noted: 2021-06-21

## 2021-06-21 LAB
ANION GAP SERPL CALCULATED.3IONS-SCNC: 6 MMOL/L (ref 7–16)
BASOPHILS ABSOLUTE: 0.02 E9/L (ref 0–0.2)
BASOPHILS RELATIVE PERCENT: 0.2 % (ref 0–2)
BUN BLDV-MCNC: 14 MG/DL (ref 6–23)
BURR CELLS: ABNORMAL
CALCIUM SERPL-MCNC: 8.9 MG/DL (ref 8.6–10.2)
CHLORIDE BLD-SCNC: 111 MMOL/L (ref 98–107)
CO2: 22 MMOL/L (ref 22–29)
CREAT SERPL-MCNC: 0.6 MG/DL (ref 0.5–1)
EOSINOPHILS ABSOLUTE: 0.06 E9/L (ref 0.05–0.5)
EOSINOPHILS RELATIVE PERCENT: 0.7 % (ref 0–6)
FERRITIN: 434 NG/ML
GFR AFRICAN AMERICAN: >60
GFR NON-AFRICAN AMERICAN: >60 ML/MIN/1.73
GLUCOSE BLD-MCNC: 67 MG/DL (ref 74–99)
HCT VFR BLD CALC: 29.6 % (ref 34–48)
HCT VFR BLD CALC: 30.4 % (ref 34–48)
HEMOGLOBIN: 9.4 G/DL (ref 11.5–15.5)
HYPOCHROMIA: ABNORMAL
IMMATURE GRANULOCYTES #: 0.04 E9/L
IMMATURE GRANULOCYTES %: 0.5 % (ref 0–5)
IMMATURE RETIC FRACT: 10.4 % (ref 3–15.9)
IRON SATURATION: 35 % (ref 15–50)
IRON: 62 MCG/DL (ref 37–145)
KEPPRA: 11 UG/ML (ref 12–46)
LYMPHOCYTES ABSOLUTE: 1.47 E9/L (ref 1.5–4)
LYMPHOCYTES RELATIVE PERCENT: 16.8 % (ref 20–42)
MCH RBC QN AUTO: 31.1 PG (ref 26–35)
MCHC RBC AUTO-ENTMCNC: 30.9 % (ref 32–34.5)
MCV RBC AUTO: 100.7 FL (ref 80–99.9)
MONOCYTES ABSOLUTE: 0.3 E9/L (ref 0.1–0.95)
MONOCYTES RELATIVE PERCENT: 3.4 % (ref 2–12)
NEUTROPHILS ABSOLUTE: 6.87 E9/L (ref 1.8–7.3)
NEUTROPHILS RELATIVE PERCENT: 78.4 % (ref 43–80)
OVALOCYTES: ABNORMAL
PDW BLD-RTO: 15.3 FL (ref 11.5–15)
PLATELET # BLD: 82 E9/L (ref 130–450)
PLATELET CONFIRMATION: NORMAL
PMV BLD AUTO: 12.1 FL (ref 7–12)
POIKILOCYTES: ABNORMAL
POLYCHROMASIA: ABNORMAL
POTASSIUM REFLEX MAGNESIUM: 3.8 MMOL/L (ref 3.5–5)
RBC # BLD: 3.02 E12/L (ref 3.5–5.5)
RETIC HGB EQUIVALENT: 32.6 PG (ref 28.2–36.6)
RETICULOCYTE ABSOLUTE COUNT: 0.05 E12/L
RETICULOCYTE COUNT PCT: 1.7 % (ref 0.4–1.9)
SCHISTOCYTES: ABNORMAL
SODIUM BLD-SCNC: 139 MMOL/L (ref 132–146)
TEAR DROP CELLS: ABNORMAL
TOTAL IRON BINDING CAPACITY: 175 MCG/DL (ref 250–450)
WBC # BLD: 8.8 E9/L (ref 4.5–11.5)

## 2021-06-21 PROCEDURE — 2580000003 HC RX 258: Performed by: FAMILY MEDICINE

## 2021-06-21 PROCEDURE — 1200000000 HC SEMI PRIVATE

## 2021-06-21 PROCEDURE — 6360000002 HC RX W HCPCS: Performed by: FAMILY MEDICINE

## 2021-06-21 PROCEDURE — 83540 ASSAY OF IRON: CPT

## 2021-06-21 PROCEDURE — 85025 COMPLETE CBC W/AUTO DIFF WBC: CPT

## 2021-06-21 PROCEDURE — 97530 THERAPEUTIC ACTIVITIES: CPT

## 2021-06-21 PROCEDURE — 82728 ASSAY OF FERRITIN: CPT

## 2021-06-21 PROCEDURE — 6370000000 HC RX 637 (ALT 250 FOR IP): Performed by: FAMILY MEDICINE

## 2021-06-21 PROCEDURE — 97166 OT EVAL MOD COMPLEX 45 MIN: CPT

## 2021-06-21 PROCEDURE — 83550 IRON BINDING TEST: CPT

## 2021-06-21 PROCEDURE — 36415 COLL VENOUS BLD VENIPUNCTURE: CPT

## 2021-06-21 PROCEDURE — 2500000003 HC RX 250 WO HCPCS: Performed by: FAMILY MEDICINE

## 2021-06-21 PROCEDURE — 80048 BASIC METABOLIC PNL TOTAL CA: CPT

## 2021-06-21 PROCEDURE — 94640 AIRWAY INHALATION TREATMENT: CPT

## 2021-06-21 PROCEDURE — 97161 PT EVAL LOW COMPLEX 20 MIN: CPT

## 2021-06-21 PROCEDURE — 85045 AUTOMATED RETICULOCYTE COUNT: CPT

## 2021-06-21 RX ADMIN — LEVETIRACETAM 750 MG: 500 TABLET, FILM COATED ORAL at 21:23

## 2021-06-21 RX ADMIN — LEVOFLOXACIN 750 MG: 5 INJECTION, SOLUTION INTRAVENOUS at 17:02

## 2021-06-21 RX ADMIN — LORAZEPAM 1 MG: 1 TABLET ORAL at 04:18

## 2021-06-21 RX ADMIN — MEMANTINE HYDROCHLORIDE 10 MG: 10 TABLET, FILM COATED ORAL at 21:23

## 2021-06-21 RX ADMIN — IPRATROPIUM BROMIDE AND ALBUTEROL SULFATE 1 AMPULE: .5; 3 SOLUTION RESPIRATORY (INHALATION) at 12:30

## 2021-06-21 RX ADMIN — DONEPEZIL HYDROCHLORIDE 10 MG: 5 TABLET, FILM COATED ORAL at 21:23

## 2021-06-21 RX ADMIN — ATORVASTATIN CALCIUM 80 MG: 80 TABLET, FILM COATED ORAL at 21:23

## 2021-06-21 RX ADMIN — Medication 10 ML: at 21:23

## 2021-06-21 RX ADMIN — METRONIDAZOLE 500 MG: 500 INJECTION, SOLUTION INTRAVENOUS at 15:11

## 2021-06-21 RX ADMIN — MEMANTINE HYDROCHLORIDE 10 MG: 10 TABLET, FILM COATED ORAL at 10:57

## 2021-06-21 RX ADMIN — ENOXAPARIN SODIUM 40 MG: 40 INJECTION SUBCUTANEOUS at 10:56

## 2021-06-21 RX ADMIN — LACOSAMIDE 50 MG: 100 TABLET, FILM COATED ORAL at 21:24

## 2021-06-21 RX ADMIN — Medication 400 MG: at 10:57

## 2021-06-21 RX ADMIN — IPRATROPIUM BROMIDE AND ALBUTEROL SULFATE 1 AMPULE: .5; 3 SOLUTION RESPIRATORY (INHALATION) at 09:54

## 2021-06-21 RX ADMIN — LACOSAMIDE 50 MG: 100 TABLET, FILM COATED ORAL at 10:58

## 2021-06-21 RX ADMIN — LEVETIRACETAM 750 MG: 500 TABLET, FILM COATED ORAL at 10:57

## 2021-06-21 RX ADMIN — METRONIDAZOLE 500 MG: 500 INJECTION, SOLUTION INTRAVENOUS at 03:50

## 2021-06-21 RX ADMIN — METRONIDAZOLE 500 MG: 500 INJECTION, SOLUTION INTRAVENOUS at 21:22

## 2021-06-21 ASSESSMENT — PAIN SCALES - GENERAL
PAINLEVEL_OUTOF10: 0
PAINLEVEL_OUTOF10: 0

## 2021-06-21 NOTE — CONSULTS
Comprehensive Nutrition Assessment    Type and Reason for Visit:  Initial, Consult    Nutrition Recommendations/Plan: Continue current diet, Start Ensure BID    Nutrition Assessment:  Pt admit w/ PNA s/p fall w/ h/o dementia & seizures. Noted moderate malnutrition w/ poor PO intakes. Pt noted w/ sacral wound. will add ONS and continue to monitor. Malnutrition Assessment:  Malnutrition Status: Moderate malnutrition    Context:  Chronic Illness     Findings of the 6 clinical characteristics of malnutrition:  Energy Intake:  7 - 75% or less estimated energy requirements for 1 month or longer  Weight Loss:  Unable to assess (no wt hx on file)     Body Fat Loss:   (moderate fat loss) Triceps, Orbital   Muscle Mass Loss:   (moderate muscle mass loss) Temples (temporalis), Clavicles (pectoralis & deltoids)  Fluid Accumulation:  No significant fluid accumulation     Strength:  Not Performed    Estimated Daily Nutrient Needs:  Energy (kcal):  1013-2464; Weight Used for Energy Requirements:  Current     Protein (g):  65-75 (1.2-1.4); Weight Used for Protein Requirements:  Current        Fluid (ml/day):  2203-5094; Method Used for Fluid Requirements:  1 ml/kcal      Nutrition Related Findings:  disoriented x4 w/ dementia, nonverbal, abd WDL, no edema, weakness, fluids WNL      Wounds:  Wound Consult Pending, Open Wounds (wound noted to sacrum)       Current Nutrition Therapies:    ADULT DIET;  Regular    Anthropometric Measures:  · Height: 5' 5\" (165.1 cm)  · Current Body Weight: 122 lb (55.3 kg) (6/21 bed scale)   · Usual Body Weight:  (no wt hx on file)     · Ideal Body Weight: 125 lbs; % Ideal Body Weight 97.6 %   · BMI: 20.3  · BMI Categories: Normal Weight (BMI 22.0 to 24.9) age over 72       Nutrition Diagnosis:   · Moderate malnutrition, In context of chronic illness related to cognitive or neurological impairment as evidenced by intake 26-50%, poor intake prior to admission, moderate loss of subcutaneous fat, moderate muscle loss    Nutrition Interventions:   Food and/or Nutrient Delivery:  Continue Current Diet, Start Oral Nutrition Supplement (Ensure BID)  Nutrition Education/Counseling:  Education not appropriate   Coordination of Nutrition Care:  Continue to monitor while inpatient    Goals:  pt to consume >75% meals/ONS       Nutrition Monitoring and Evaluation:   Food/Nutrient Intake Outcomes:  Food and Nutrient Intake, Supplement Intake  Physical Signs/Symptoms Outcomes:  Biochemical Data, GI Status, Fluid Status or Edema, Nutrition Focused Physical Findings, Skin, Weight     Discharge Planning:    Continue Oral Nutrition Supplement     Electronically signed by Macarena Seay, MS, RD, LD on 6/21/21 at 2:20 PM EDT    Contact: [phone]

## 2021-06-21 NOTE — CARE COORDINATION
Spoke with the pt's daughter, Erik Loya, at the bedside. The pt lives with her (the pt) sister, Plama Rizo. The pt is ambulatory. She does not use an assistive device, but they walk with her because her knees buckle. The daughter will discuss discharge plan with her aunt.  Ehsan Boyd -[phone]

## 2021-06-21 NOTE — PROGRESS NOTES
Hospitalist Progress Note      PCP: No primary care provider on file. Date of Admission: 6/18/2021    Chief Complaint: Pneumonia leukopenia seizure    Hospital Course: 72 y.o. female with past medical history of dementia and seizures . Patient is a transfer from [facility] brought patient into the ED after a fall. Family was able to catch her and prevent  her from hitting her heed. They denied any seizure like activity , loss of consciousness . HPI limited due to dementia and patient is nonverbal  . EMS reported that patient 's oxygen saturation was 76% on room air . Patient was saturating 100% in ED  Laboratory data revealed mag 1.4 ,  CXR revealed bilateral opacities AST 41 ALT 51 WBC 1.7 PLPLT 99 Glucose 140 Trop 3 EKG sinus bradycardia . HGb 13.2 Patient was hypotensive in ED at 97/42 . CTA/P revealed bilateral infiltrate both lungs and possible enteritis and acute  cholecystitis not excluded     Subjective: Seen and examined by me personally she is doing clinically better  Was transferred here for pneumonia electrolyte abnormalities history of falls. Patient does not talk only yes and no seems understand more per family notes she was able to walk.   But now patient does not walk and had poor p.o. intake  Physical therapy ordering, patient will need placement possible rehab    Medications:  Reviewed    Infusion Medications    sodium chloride      sodium chloride 75 mL/hr at 06/19/21 1600     Scheduled Medications    atorvastatin  80 mg Per NG tube Nightly    donepezil  10 mg Oral Nightly    levETIRAcetam  750 mg Oral BID    memantine  10 mg Oral BID    lacosamide  50 mg Oral BID    sodium chloride flush  5-40 mL Intravenous 2 times per day    enoxaparin  40 mg Subcutaneous Daily    ipratropium-albuterol  1 ampule Inhalation Q4H WA    levofloxacin  750 mg Intravenous Q24H    metroNIDAZOLE  500 mg Intravenous Q8H    magnesium oxide  400 mg Oral Daily     PRN Meds: LORazepam, sodium chloride flush, sodium chloride, ondansetron **OR** ondansetron, polyethylene glycol, acetaminophen **OR** acetaminophen      Intake/Output Summary (Last 24 hours) at 6/21/2021 1431  Last data filed at 6/21/2021 0819  Gross per 24 hour   Intake 866 ml   Output    Net 866 ml       Exam:    /64   Pulse 59   Temp 96.2 °F (35.7 °C) (Temporal)   Resp 16   Ht 5' 5\" (1.651 m)   Wt 122 lb 4.8 oz (55.5 kg)   SpO2 97%   BMI 20.35 kg/m²     General appearance: No apparent distress, appears stated age and cooperative. HEENT: Pupils equal, round, and reactive to light. Conjunctivae/corneas clear. Neck: Supple, with full range of motion. No jugular venous distention. Trachea midline. Respiratory:  Normal respiratory effort. Clear to auscultation, bilaterally without Rales/Wheezes/Rhonchi. Cardiovascular: Regular rate and rhythm with normal S1/S2 without murmurs, rubs or gallops. Abdomen: Soft, non-tender, non-distended with normal bowel sounds. Musculoskeletal: No clubbing, cyanosis or edema bilaterally. Full range of motion without deformity. Skin: Skin color, texture, turgor normal.  No rashes or lesions. Neurologic:  Neurovascularly intact without any focal sensory/motor deficits. Cranial nerves: II-XII intact, grossly non-focal.  Psychiatric: Alert and oriented, thought content appropriate, normal insight    Labs:   Recent Labs     06/20/21  0520 06/20/21  1537 06/21/21  0540   WBC 9.0 9.6 8.8   HGB 9.4* 8.8* 9.4*   HCT 29.4* 28.1* 30.4*   PLT 83* 84* 82*     Recent Labs     06/20/21  0520 06/20/21  1537 06/21/21  0540    139 139   K 4.0 4.1 3.8   * 110* 111*   CO2 25 24 22   BUN 14 14 14   CREATININE 0.6 0.6 0.6   CALCIUM 9.0 8.8 8.9     Recent Labs     06/20/21  1537   AST 29   ALT 31   BILITOT 0.5   ALKPHOS 105*     No results for input(s): INR in the last 72 hours. No results for input(s): Arielle Lawrence in the last 72 hours.     Assessment/Plan:    Active Hospital Problems Diagnosis Date Noted    Moderate protein-calorie malnutrition ([facility] [address] [E44.0] 06/21/2021    Pneumonia [J18.9] 06/18/2021     Community Acquired Pneumonia  patient is a transfer from [place] [address] and CT revealed bilateral opacities . Patient was saturating >90% on room air on admission . She received Rocephin ion admission  Continued on IV Levaquin   blood cultures Legionella Strep Procal Lactic acid Resp panel     continue current diet start on Ensure twice daily     Acute Enteritis :   as on CT on admission . Levaquin and added on Flagyl . CT mentioned that cholecystitis   RUQ US   Impression   1.  Cholelithiasis.  No biliary dilatation or findings of acute cholecystitis.       2.  Tiny free fluid within the right abdomen.       3.  Retroperitoneal cluster of small cysts measuring 2.8 cm in maximal   dimension posterior to the pancreas and anterior to the aorta. Continue on Flagyl  No abdominal pain on examination    Hypomagnesemia:   place on Mag and replete as need       Leukopenia per transferred Hospital:   No leukopenia on admission  WBC 9.0 -8.8  hematology consult appreciated     History of Seizures :   C/w Vimpat, Keppra Due to Fall prior  to admission concerned if this was related to seizure  will consult neurology      Dementia: C/w Aricept and Namenda    Patient do not want to communicate  She does not want to eat  consult nutritionist    History of sacral decub   Continue wound care  Increase physical activity    DVT Prophylaxis: lovenox   Diet: ADULT DIET;  Regular  Adult Oral Nutrition Supplement; Standard High Calorie/High Protein Oral Supplement  Code Status: Full Code    PT/OT Eval Status: PT/OT  Patient will need rehab versus skilled nursing facility with rehab upon discharge Case management consulted for placement  Dispo -based on clinical improvement    Hugo Reid MD

## 2021-06-21 NOTE — PROGRESS NOTES
[facility]  Hematology/Oncology  Consult      Patient Name: Gayatri Amaya  YOB: 1955  PCP: No primary care provider on file. Referring Provider: Verónica Hercules / [address] [place] [address] [facility]     Reason for Consultation: No chief complaint on file. Subjective: Patient remains confused. She states she was in pain but didn't know where and then closed her eyes. History of Present Illness:  78-year old woman with past medical history relevant for dementia and seizures. Patient is mostly nonverbal and most of her history is obtained from her EMR. Apparently patient had sustained a fall and was brought by EMS and was noted to be hypoxic with O2 sat of 76% on room air. In the emergency room her chest x-ray showed bilateral opacities. Her CBC showed anemia with a hemoglobin of 10.6 with normal MCV normal WBC count with a low platelet count of 321. Her CMP showed normal serum creatinine with elevated lactic acid at 2.3. Blood cultures are pending  Ultrasound of abdomen showed cholelithiasis without findings of acute cholecystitis  Retroperitoneal cluster of small cyst posterior to the pancreas was described    Diagnostic Data:     Past Medical History:   Diagnosis Date    Dementia ([address]     Seizure ([address]     Subdural hematoma ([address]        Patient Active Problem List    Diagnosis Date Noted    Moderate protein-calorie malnutrition ([address] 06/21/2021    Pneumonia 06/18/2021    Dysphagia 11/28/2020    Dementia ([address] 11/28/2020    Seizure ([address] 11/18/2020    Subdural hematoma ([address] 11/18/2020    Head injury         Past Surgical History:   Procedure Laterality Date    CRANIOTOMY Left 11/18/2020    CRANIOTOMY BRANDIE HOLES performed by Demario Amaya MD at [address] [place] [address] History  History reviewed. No pertinent family history. Social History    TOBACCO:   reports that she quit smoking about 25 years ago. Her smoking use included cigarettes.  She has a 20.00 pack-year smoking history. She has never used smokeless tobacco.  ETOH:   reports previous alcohol use. Home Medications  Prior to Admission medications    Medication Sig Start Date End Date Taking? Authorizing Provider   VIMPAT 50 MG TABS tablet Take 1 tablet by mouth 2 times daily. 1/20/21   Historical Provider, MD   Cholecalciferol (VITAMIN D3) 125 MCG (5000 UT) TABS Take by mouth    Historical Provider, MD   atorvastatin (LIPITOR) 80 MG tablet 1 tablet by Per NG tube route nightly 11/30/20   Collette Brown Abt, APRN - NP   levETIRAcetam (KEPPRA) 750 MG tablet Take 750 mg by mouth 2 times daily    Historical Provider, MD   LORazepam (ATIVAN) 1 MG tablet Take 1 mg by mouth 2 times daily as needed for Anxiety. Historical Provider, MD   memantine (NAMENDA) 10 MG tablet Take 10 mg by mouth 2 times daily    Historical Provider, MD   donepezil (ARICEPT) 10 MG tablet Take 10 mg by mouth nightly    Historical Provider, MD       Allergies  Allergies   Allergen Reactions    Morphine        Review of Systems: Unable to obtain patient with dementia and nonverbal           Objective  /64   Pulse 59   Temp 96.2 °F (35.7 °C) (Temporal)   Resp 16   Ht 5' 5\" (1.651 m)   Wt 122 lb 4.8 oz (55.5 kg)   SpO2 97%   BMI 20.35 kg/m²     Physical Exam:     General: Alert, in no acute distress,   Head and neck : PERRLA, EOMI . Sclera non icteric. Oropharynx : Clear  Neck: no JVD,  no adenopathy,   Heart: Regular rate and regular rhythm,   Lungs: Clear to auscultation   Extremities: No edema,   Abdomen: Soft, non-tender;no masses, no organomegaly  Skin:  No rash. Neurologic:Cranial nerves grossly intact.   Alert but nonverbal    Recent Laboratory Data-   Lab Results   Component Value Date    WBC 8.8 06/21/2021    HGB 9.4 (L) 06/21/2021    HCT 30.4 (L) 06/21/2021    .7 (H) 06/21/2021    PLT 82 (L) 06/21/2021    LYMPHOPCT 16.8 (L) 06/21/2021    RBC 3.02 (L) 06/21/2021    MCH 31.1 06/21/2021    MCHC 30.9 (L) 06/21/2021    RDW 15.3 (H) 06/21/2021    NEUTOPHILPCT 78.4 06/21/2021    MONOPCT 3.4 06/21/2021    BASOPCT 0.2 06/21/2021    NEUTROABS 6.87 06/21/2021    LYMPHSABS 1.47 (L) 06/21/2021    MONOSABS 0.30 06/21/2021    EOSABS 0.06 06/21/2021    BASOSABS 0.02 06/21/2021       Lab Results   Component Value Date     06/21/2021    K 3.8 06/21/2021     (H) 06/21/2021    CO2 22 06/21/2021    BUN 14 06/21/2021    CREATININE 0.6 06/21/2021    GLUCOSE 67 (L) 06/21/2021    CALCIUM 8.9 06/21/2021    PROT 5.1 (L) 06/20/2021    LABALBU 2.4 (L) 06/20/2021    BILITOT 0.5 06/20/2021    ALKPHOS 105 (H) 06/20/2021    AST 29 06/20/2021    ALT 31 06/20/2021    LABGLOM >60 06/21/2021    GFRAA >60 06/21/2021       No results found for: IRON, TIBC, FERRITIN        Radiology-    [address] organ? GALLBLADDER, PANCREAS, LIVER    Result Date: 6/19/2021  EXAMINATION: RIGHT UPPER QUADRANT ULTRASOUND 6/19/2021 7:51 am COMPARISON: None. HISTORY: ORDERING SYSTEM PROVIDED HISTORY: Cholecystistis TECHNOLOGIST PROVIDED HISTORY: Reason for exam:->Cholecystistis Specify organ?->GALLBLADDER Specify organ?->PANCREAS Specify organ?->LIVER What reading provider will be dictating this exam?->CRC FINDINGS: The gallbladder is adequately distended and shows multiple intraluminal stones. No gallbladder wall thickening or pericholecystic edema. The common bile duct measures 0.4 cm in diameter which is normal.  Negative sonographic Uriarte sign. No intrahepatic biliary dilatation. Visualized portions of the liver are unremarkable. Tiny free fluid within the right abdomen. Pancreas: Thin body habitus and the pancreas is adequately visualized. The pancreatic duct is normal in caliber measuring 0.2 cm in diameter. The pancreas appears normal in overall size. Posterior to the pancreatic body and neck, a cluster of small cysts is present measuring 2.8 cm in length and 1.1 cm in short axis dimension.   The cysts lie within the retroperitoneum and anterior to the aorta. The right kidney is normal in size measuring 12.6 cm in length. Right renal mild pelvicaliectasis without krysten hydronephrosis. 1.  Cholelithiasis. No biliary dilatation or findings of acute cholecystitis. 2.  Tiny free fluid within the right abdomen. 3.  Retroperitoneal cluster of small cysts measuring 2.8 cm in maximal dimension posterior to the pancreas and anterior to the aorta. ASSESSMENT/PLAN : 59-year-old woman with dementia  Status post recent fall    Chest x-ray from outside hospital reported bilateral infiltrates and CT scan of abdomen and pelvis also from outside hospital was suggestive of enteritis. She had leukopenia but her repeat CBC today shows normal WBC count. She has moderate normocytic anemia likely related to myelosuppression by her pneumonia and possible enteritis. Moderate thrombocytopenia likely related to myelosuppression by infection. Review peripheral blood smear and check iron studies and reticulocyte count  Continue present antibiotics. 6/21/2021  - CBC stable today. Continues with anemia likely related to myelosuppression by her pneumonia and possible enteritis. No leukopenia noted. - Peripheral blood smear pending  - Retics reviewed  Iron profile consistent with ACOD/myelosuppression   - Remains on IV Flagyl  - We will continue to follow    MINA Marroquin, CNP  Electronically signed 6/21/2021 at 4:12 PM   PT seen and examined.  Note updated  Mallika Ball MD

## 2021-06-21 NOTE — PLAN OF CARE
Problem: Falls - Risk of:  Goal: Will remain free from falls  Description: Will remain free from falls  6/20/2021 2332 by Chey Mabry RN  Outcome: Met This Shift  6/20/2021 1253 by Amy Barry RN  Outcome: Met This Shift  Goal: Absence of physical injury  Description: Absence of physical injury  6/20/2021 1253 by Amy Barry RN  Outcome: Met This Shift     Problem: Skin Integrity:  Goal: Will show no infection signs and symptoms  Description: Will show no infection signs and symptoms  6/20/2021 2332 by Chey Mabry RN  Outcome: Met This Shift  6/20/2021 1253 by Amy Barry RN  Outcome: Ongoing

## 2021-06-21 NOTE — PROGRESS NOTES
Physical Therapy    Physical Therapy Initial Assessment       Name: Loren Brizuela  : 1955  MRN: [id number]    Date of Service: 2021  Evaluating PT:  Sarah Spann PT, NILSA  [id number]  Room #:  2970/7163-R  Diagnosis:  Pneumonia [J18.9]    PMHx/PSHx:  Dementia, Seizure, SDH  Procedure/Surgery:    Precautions:  Falls, Cognition, TSM  Equipment Needs:      SUBJECTIVE:    Pt is a poor historian. Unable to provide history on PLOF or home set up. Equipment Owned:     OBJECTIVE:   Initial Evaluation  Date: 21 Treatment Short Term/ Long Term   Goals   AM-PAC 6 Clicks      Was pt agreeable to Eval/treatment? Yes     Does pt have pain? No c/o pain     Bed Mobility  Rolling: MaxA  Supine to sit: MaxA x 2  Sit to supine: MaxA x 2  Scooting: MaxA   Rolling: Supervision  Supine to sit: Solitario  Sit to supine: Solitario  Scooting: Solitario   Transfers Sit to stand: MaxA x 2  Stand to sit: MaxA x 2  Stand pivot: NT  Sit to stand: ModA  Stand to sit: ModA  Stand pivot: ModA   Ambulation    NT  >5' ModA [employer]   Stair negotiation: ascended and descended NT     ROM BUE:  Defer to OT  BLE:  WFL     Strength BUE:  Defer to OT  BLE:  Poor command follow  BLE AROM observed when initiating sit to stand. Improve strength 1 MMT grade   Balance Sitting EOB:  Solitario  Dynamic Standing:  MaxA x 2 [employer]  Sitting EOB:  Independent    Dynamic Standing:  Modified Independent  AAD     Pt is A & O x (self). Does not answer to orientation questions despite stating name. Poor command follow with simple commands. Pt performing some functional mobility with max cues/ hand over hand facilitation  Sensation:  Unable to assess accurately.   Edema:  WNL    Patient education  Pt educated on role of PT    Patient response to education:   Pt verbalized understanding Pt demonstrated skill Pt requires further education in this area   x x x     ASSESSMENT:    Conditions Requiring Skilled Therapeutic Intervention:    [x]Decreased strength     [x]Decreased ROM [x]Decreased functional mobility  [x]Decreased balance   [x]Decreased endurance   [x]Decreased posture  []Decreased sensation  []Decreased coordination   []Decreased vision  [x]Decreased safety awareness   [x]Increased pain       Comments:  Pt received in supine agreeable to PT evaluation. Pt with poor command follow. Pt demonstrates strength, balance, and endurance deficits. Pt noted to be incontinent of stool, however unable to stand more than 2x ~ 5-10 seconds. Pericare and hygiene performed in bed. Soiled pads removed and replaced with clean. Patient would benefit from continued skilled PT to maximize functional mobility independence. Alarm activated. Treatment:  Patient practiced and was instructed in the following treatment:     Bed mobility- max cues to facilitate independence   Functional transfers-Verbal cues for proper positioning and sequencing to perform transfers safely with maximum independence. Pt's/ family goals   1. Get better    Prognosis is good for reaching above PT goals. Patient and or family understand(s) diagnosis, prognosis, and plan of care. yes    PHYSICAL THERAPY PLAN OF CARE:    PT POC is established based on physician order and patient diagnosis     Referring provider/PT Order:    PT eval and treat Start: 06/20/21 1315, End: 06/20/21 1315, ONE TIME, Standing Count: 1 Occurrences, R      Chaz Estrada MD       Diagnosis:  Pneumonia [J18.9]  Specific instructions for next treatment:  Increase independence with functional mobility.      Current Treatment Recommendations:     [x] Strengthening to improve independence with functional mobility   [x] ROM to improve independence with functional mobility   [x] Balance Training to improve static/dynamic balance and to reduce fall risk  [x] Endurance Training to improve activity tolerance during functional mobility   [x] Transfer Training to improve safety and independence with all functional transfers   [x] Gait Training to improve gait mechanics, endurance and assess need for appropriate assistive device  [] Stair Training in preparation for safe discharge home and/or into the community   [x] Positioning to prevent skin breakdown and contractures  [x] Safety and Education Training   [x] Patient/Caregiver Education   [x] HEP  [] Other     PT long term treatment goals are located in above grid    Frequency of treatments: 2-5x/week x 1-2 weeks. Time in  1055  Time out  1120    Total Treatment Time  8 minutes     Evaluation Time includes thorough review of current medical information, gathering information on past medical history/social history and prior level of function, completion of standardized testing/informal observation of tasks, assessment of data and education on plan of care and goals.     CPT codes:  [x] Low Complexity PT evaluation 62302  [] Moderate Complexity PT evaluation 15412  [] High Complexity PT evaluation 15652  [] PT Re-evaluation 19950  [] Gait training 88702 0 minutes  [] Manual therapy 47024 0 minutes  [x] Therapeutic activities 33224 8 minutes  [] Therapeutic exercises 42480 0 minutes  [] Neuromuscular reeducation 91358 0 minutes       Porfirio Leventhal PT, DPT   [id number]

## 2021-06-21 NOTE — PROGRESS NOTES
Patient found with IV out and licking it. IV replaced and telesitter moved to a different angle so they can see her better.

## 2021-06-21 NOTE — PROGRESS NOTES
[address]  [address] [place] [address]      Date:2021                 Patient Name: Yolanda Arias  MRN: [id number]  : 1955  Room: [facility]7615-X    Referring Provider:Melody Cabral MD  Specific Provider Orders/Date: OT evaluation and treat 21    Evaluating OT: Isamar Dickson OTR/L #[id number]    Diagnosis: pneumonia      Surgery: craniotomy     Pertinent Medical History: dementia, seizure, SDH  Past Medical History:   Diagnosis Date    [facility]     Seizure ([facility]     Subdural hematoma (HCC)           Precautions:  Fall Risk, safety , TSM, incontinent, hypotention  Assessment of current deficits    [x] Functional mobility  [x]ADLs  [] Strength               [x]Cognition   [x] Functional transfers   [x] IADLs         [x] Safety Awareness   [x]Endurance   [x] Fine Coordination              [x] Balance      [] Vision/perception   []Sensation    [x]Gross Motor Coordination  [] ROM  [] Delirium                   [x] Motor Control     OT PLAN OF CARE   OT POC based on physician orders, patient diagnosis and results of clinical assessment    Frequency/Duration   1-3 days/wk for 1 week PRN   Specific OT Treatment Interventions to include:   * Instruction/training on adapted ADL techniques and AE recommendations to increase functional independence within precautions       * Training on energy conservation strategies, correct breathing pattern and techniques to improve independence/tolerance for self-care routine  * Functional transfer/mobility training/DME recommendations for increased independence, safety, and fall prevention  * Patient/Family education to increase follow through with safety techniques and functional independence  * Recommendation of environmental modifications for increased safety with functional transfers/mobility and ADLs  * Cognitive retraining/development of therapeutic activities to improve problem solving, judgement, memory, and attention for increased safety/participation in ADL/IADL tasks  * Therapeutic activities to facilitate/challenge dynamic balance, stand tolerance for increased safety and independence with ADLs  * Therapeutic activities to facilitate gross/fine motor skills for increased independence with ADLs    OTMRS Modified Kassie Scale (MRS)  Score     Description  0             No symptoms  1             No significant disability despite symptoms  2             Slight disability; able to look after own affairs  3             Moderate disability; able to ambulate without assist/ requires assist with ADLs  4             Moderate/Severe disability;requires assist to ambulate/assist with ADLs  5             Severe disability;bedridden/incontinent   6               Score:  5    Recommended Adaptive Equipment: TBD     Home Living: Pt is a poor historian in 2020  lives with sister  in a 2 story home with level entry and no steps.    Bathroom setup: per chart tub shower  Equipment owned: per chart ww    Prior Level of Function: unable to state    Pain Level: noted pain when completing bowel hygiene- nursing notified   Cognition: A&O: 1/4; Follows 0-1 step directions infrequent and inconsistent   Memory:  poor   Sequencing:  poor   Problem solving:  poor   Judgement/safety:  poor     Functional Assessment:  AM-PAC Daily Activity Raw Score:    Initial Eval Status  Date: 21 Treatment Status  Date: STGs = LTGs  Time frame: 1-3 days   Feeding Max A able to swallow and take food off spoon, unable to scoop or hold utensil  Mod A    Grooming Dependent attended to task yet could not hold or sequence task of hand washing  Max A    UB Dressing dependent  Max A    LB Dressing Dependent   Max A    Bathing dependent  Max A    Toileting Dependent in continent of bowel  Mod A to BSC   Bed Mobility  Supine to sit: max A x2  Sit to supine:  Max A x2  Supine to sit: mod A   Sit to supine: mod A    Functional Transfers Max A x2 and mod A x2 when patient initiated task herself  Min A with ww   Functional Mobility N/t patient was incontinent of bowel with standing activity  Mod A with ww   Balance Sitting:     Static:  Min A     Dynamic:min A   Standing: min A able to stand with out UE support                                                                        Activity Tolerance Poor due to limited ability to follow commands or tolerate physical cueing O2 WFL RA  fair   Visual/  Perceptual Glasses: yes unable to assess         Safety poor                                  Poor+      Hand Dominance unable to state   AROM (PROM) Strength Additional Info:    RUE  Unable to assess due to resistance- noted flexor tone N/t  N/t unable to follow command for          LUE Unable to assess N/t  N/t unable to follow command for       Hearing: WFL   Sensation:  Unable to test    Tone: WFL increased in UE's  Edema: none noted    Comments: Upon arrival patient sitting up in bed and nursing in room giving medication. Performed OT evaluation with PT due to need of skilled hands due to unpredictable behavior and poor command following. At end of session, patient side lying to L and blanket between LE's and  with call light and phone within reach, all lines and tubes intact. Overall patient demonstrated severely decreased independence and safety during completion of ADL/functional transfer/mobility tasks. Pt would benefit from continued skilled OT to increase safety and independence with completion of ADL/IADL tasks for functional independence and quality of life.     Treatment: OT treatment provided this date includes:    Instruction/training on safety and adapted techniques for completion of ADLs: attempting to increased ability to cooperate in basic feeding and grooming tasks    Instruction/training on safe functional mobility/transfer techniques: with ww prn    Neuromuscular Reeducation to facilitate balance/righting reactions for increased function with ADLs tasks:     Neuromuscular Facilitation of B  UE functional movement/ROM. Aurora Velez motor dexterity for ADL completion    Proper Positioning/Alignment to prevent skin breakdown        Rehab Potential: Guarded for established goals     Patient / Family Goal: unable to state      Patient and/or family were instructed on functional diagnosis, prognosis/goals and OT plan of care. Demonstrated poor understanding. Eval Complexity: mod    Time In: 10:55  Time Out: 11:25  Total Treatment Time: 15 min. Min Units   OT Eval Low 12024       OT Eval Medium 52575  X    OT Eval High [id number]       OT Re-Eval [id number]       Therapeutic Ex (27) [phone]       Therapeutic Activities 29643  15  1   ADL/Self Care 33318       Orthotic Management 42918       Neuro Re-Ed 71369       Non-Billable Time          Evaluation Time includes thorough review of current medical information, gathering information on past medical history/social history and prior level of function, completion of standardized testing/informal observation of tasks, assessment of data and education on plan of care and goals. Isamar Caro.  Claudia 72, Merry 70

## 2021-06-22 LAB
ANION GAP SERPL CALCULATED.3IONS-SCNC: 9 MMOL/L (ref 7–16)
BASOPHILS ABSOLUTE: 0.03 E9/L (ref 0–0.2)
BASOPHILS RELATIVE PERCENT: 0.5 % (ref 0–2)
BUN BLDV-MCNC: 13 MG/DL (ref 6–23)
CALCIUM SERPL-MCNC: 8.7 MG/DL (ref 8.6–10.2)
CHLORIDE BLD-SCNC: 110 MMOL/L (ref 98–107)
CO2: 21 MMOL/L (ref 22–29)
CREAT SERPL-MCNC: 0.5 MG/DL (ref 0.5–1)
EOSINOPHILS ABSOLUTE: 0.13 E9/L (ref 0.05–0.5)
EOSINOPHILS RELATIVE PERCENT: 2.2 % (ref 0–6)
GFR AFRICAN AMERICAN: >60
GFR NON-AFRICAN AMERICAN: >60 ML/MIN/1.73
GLUCOSE BLD-MCNC: 74 MG/DL (ref 74–99)
HCT VFR BLD CALC: 29 % (ref 34–48)
HEMOGLOBIN: 9.2 G/DL (ref 11.5–15.5)
IMMATURE GRANULOCYTES #: 0.04 E9/L
IMMATURE GRANULOCYTES %: 0.7 % (ref 0–5)
LYMPHOCYTES ABSOLUTE: 1.37 E9/L (ref 1.5–4)
LYMPHOCYTES RELATIVE PERCENT: 23 % (ref 20–42)
MAGNESIUM: 1.3 MG/DL (ref 1.6–2.6)
MCH RBC QN AUTO: 32.1 PG (ref 26–35)
MCHC RBC AUTO-ENTMCNC: 31.7 % (ref 32–34.5)
MCV RBC AUTO: 101 FL (ref 80–99.9)
MONOCYTES ABSOLUTE: 0.39 E9/L (ref 0.1–0.95)
MONOCYTES RELATIVE PERCENT: 6.5 % (ref 2–12)
NEUTROPHILS ABSOLUTE: 4 E9/L (ref 1.8–7.3)
NEUTROPHILS RELATIVE PERCENT: 67.1 % (ref 43–80)
PDW BLD-RTO: 14.9 FL (ref 11.5–15)
PLATELET # BLD: 105 E9/L (ref 130–450)
PMV BLD AUTO: 11.7 FL (ref 7–12)
POTASSIUM REFLEX MAGNESIUM: 3.5 MMOL/L (ref 3.5–5)
RBC # BLD: 2.87 E12/L (ref 3.5–5.5)
SODIUM BLD-SCNC: 140 MMOL/L (ref 132–146)
WBC # BLD: 6 E9/L (ref 4.5–11.5)

## 2021-06-22 PROCEDURE — 6370000000 HC RX 637 (ALT 250 FOR IP): Performed by: FAMILY MEDICINE

## 2021-06-22 PROCEDURE — 2580000003 HC RX 258: Performed by: FAMILY MEDICINE

## 2021-06-22 PROCEDURE — 85025 COMPLETE CBC W/AUTO DIFF WBC: CPT

## 2021-06-22 PROCEDURE — 36415 COLL VENOUS BLD VENIPUNCTURE: CPT

## 2021-06-22 PROCEDURE — 83735 ASSAY OF MAGNESIUM: CPT

## 2021-06-22 PROCEDURE — 80048 BASIC METABOLIC PNL TOTAL CA: CPT

## 2021-06-22 PROCEDURE — 6360000002 HC RX W HCPCS: Performed by: INTERNAL MEDICINE

## 2021-06-22 PROCEDURE — 94640 AIRWAY INHALATION TREATMENT: CPT

## 2021-06-22 PROCEDURE — 1200000000 HC SEMI PRIVATE

## 2021-06-22 PROCEDURE — 6360000002 HC RX W HCPCS: Performed by: FAMILY MEDICINE

## 2021-06-22 PROCEDURE — 2500000003 HC RX 250 WO HCPCS: Performed by: FAMILY MEDICINE

## 2021-06-22 RX ORDER — MAGNESIUM SULFATE 1 G/100ML
1000 INJECTION INTRAVENOUS PRN
Status: DISCONTINUED | OUTPATIENT
Start: 2021-06-22 | End: 2021-06-25 | Stop reason: HOSPADM

## 2021-06-22 RX ADMIN — ATORVASTATIN CALCIUM 80 MG: 80 TABLET, FILM COATED ORAL at 20:26

## 2021-06-22 RX ADMIN — METRONIDAZOLE 500 MG: 500 INJECTION, SOLUTION INTRAVENOUS at 20:25

## 2021-06-22 RX ADMIN — LACOSAMIDE 50 MG: 100 TABLET, FILM COATED ORAL at 20:25

## 2021-06-22 RX ADMIN — METRONIDAZOLE 500 MG: 500 INJECTION, SOLUTION INTRAVENOUS at 11:54

## 2021-06-22 RX ADMIN — MAGNESIUM SULFATE HEPTAHYDRATE 1000 MG: 1 INJECTION, SOLUTION INTRAVENOUS at 22:22

## 2021-06-22 RX ADMIN — ENOXAPARIN SODIUM 40 MG: 40 INJECTION SUBCUTANEOUS at 09:44

## 2021-06-22 RX ADMIN — IPRATROPIUM BROMIDE AND ALBUTEROL SULFATE 1 AMPULE: .5; 3 SOLUTION RESPIRATORY (INHALATION) at 07:47

## 2021-06-22 RX ADMIN — LACOSAMIDE 50 MG: 100 TABLET, FILM COATED ORAL at 09:44

## 2021-06-22 RX ADMIN — IPRATROPIUM BROMIDE AND ALBUTEROL SULFATE 1 AMPULE: .5; 3 SOLUTION RESPIRATORY (INHALATION) at 17:09

## 2021-06-22 RX ADMIN — Medication 400 MG: at 09:44

## 2021-06-22 RX ADMIN — MEMANTINE HYDROCHLORIDE 10 MG: 10 TABLET, FILM COATED ORAL at 09:44

## 2021-06-22 RX ADMIN — IPRATROPIUM BROMIDE AND ALBUTEROL SULFATE 1 AMPULE: .5; 3 SOLUTION RESPIRATORY (INHALATION) at 13:00

## 2021-06-22 RX ADMIN — SODIUM CHLORIDE: 9 INJECTION, SOLUTION INTRAVENOUS at 16:09

## 2021-06-22 RX ADMIN — LEVETIRACETAM 750 MG: 500 TABLET, FILM COATED ORAL at 20:25

## 2021-06-22 RX ADMIN — MAGNESIUM SULFATE HEPTAHYDRATE 1000 MG: 1 INJECTION, SOLUTION INTRAVENOUS at 23:52

## 2021-06-22 RX ADMIN — IPRATROPIUM BROMIDE AND ALBUTEROL SULFATE 1 AMPULE: .5; 3 SOLUTION RESPIRATORY (INHALATION) at 21:11

## 2021-06-22 RX ADMIN — LEVOFLOXACIN 750 MG: 5 INJECTION, SOLUTION INTRAVENOUS at 16:07

## 2021-06-22 RX ADMIN — METRONIDAZOLE 500 MG: 500 INJECTION, SOLUTION INTRAVENOUS at 04:18

## 2021-06-22 RX ADMIN — Medication 10 ML: at 20:26

## 2021-06-22 RX ADMIN — MEMANTINE HYDROCHLORIDE 10 MG: 10 TABLET, FILM COATED ORAL at 20:25

## 2021-06-22 RX ADMIN — DONEPEZIL HYDROCHLORIDE 10 MG: 5 TABLET, FILM COATED ORAL at 20:26

## 2021-06-22 RX ADMIN — LEVETIRACETAM 750 MG: 500 TABLET, FILM COATED ORAL at 09:44

## 2021-06-22 ASSESSMENT — PAIN SCALES - PAIN ASSESSMENT IN ADVANCED DEMENTIA (PAINAD)
TOTALSCORE: 0
BODYLANGUAGE: 0
NEGVOCALIZATION: 0
CONSOLABILITY: 0
NEGVOCALIZATION: 0
BODYLANGUAGE: 0
BREATHING: 0
BREATHING: 0
FACIALEXPRESSION: 0
TOTALSCORE: 0
CONSOLABILITY: 0
FACIALEXPRESSION: 0

## 2021-06-22 ASSESSMENT — PAIN SCALES - GENERAL
PAINLEVEL_OUTOF10: 0
PAINLEVEL_OUTOF10: 0

## 2021-06-22 NOTE — CARE COORDINATION
Spoke with  Dtr Janki about Transition Plan of care. Requested JOANNE Reina and Bobbi 78 list be e-mailed to [email or web address]  . Janki will speak with her Aunt and call choices to CALL[facility] int he morning. Therapy scores were 7/24. Discharge Plan is undetermined at this time. [facility]/SANDRA to follow for discharge needs.    Kristie Verde, L.S.W.  [phone]

## 2021-06-22 NOTE — PROGRESS NOTES
Hospitalist Progress Note      SYNOPSIS: Patient admitted on 2021 female with past medical history of dementia and seizures . Patient is a transfer from [facility] brought patient into the ED after a fall. Family was able to catch her and prevent  her from hitting her heed. They denied any seizure like activity , loss of consciousness . HPI limited due to dementia and patient is nonverbal  . EMS reported that patient 's oxygen saturation was 76% on room air . Patient was saturating 100% in ED  Laboratory data revealed mag 1.4 ,  CXR revealed bilateral opacities AST 41 ALT 51 WBC 1.7 PLPLT 99 Glucose 140 Trop 3 EKG sinus bradycardia . HGb 13.2 Patient was hypotensive in ED at 97/42 . CTA/P revealed bilateral infiltrate both lungs and possible enteritis and acute  cholecystitis not excluded     SUBJECTIVE:  Stable overnight. No other overnight issues reported. Patient seen and examined  Records reviewed. She does not answer my questioning   Opens her eyes      Temp (24hrs), Av.2 °F (36.2 °C), Min:96.2 °F (35.7 °C), Max:97.6 °F (36.4 °C)    DIET: ADULT DIET; Regular  Adult Oral Nutrition Supplement; Standard High Calorie/High Protein Oral Supplement  CODE: Full Code  No intake or output data in the 24 hours ending 21 1006    Review of Systems  Limited due to nonverbal      OBJECTIVE:    /71   Pulse 56   Temp 96.2 °F (35.7 °C) (Temporal)   Resp 18   Ht 5' 5\" (1.651 m)   Wt 122 lb 4.8 oz (55.5 kg)   SpO2 94%   BMI 20.35 kg/m²     General appearance:  awake, alert, does not answer questioning  HEENT:  Conjunctivae/corneas clear. Neck: Supple. No jugular venous distention.    Respiratory: symmetrical; clear to auscultation bilaterally; no wheezes; no rhonchi; no rales  Cardiovascular: rhythm regular; rate controlled; no murmurs  Abdomen: Soft, nontender, nondistended  Extremities:  peripheral pulses present; no peripheral edema; no ulcers  Musculoskeletal: No clubbing, cyanosis, no bilateral lower extremity edema. Brisk capillary refill. Skin:  No rashes  on visible skin  Neurologic: awake, alert     ASSESSMENT and PLAN:  Community Acquired Pneumonia  patient is a transfer from [place] [address] and CT revealed bilateral opacities . Patient was saturating >90% on room air on admission . She received Rocephin ion admission  Continued on IV Levaquin      Acute Enteritis :   as on CT on admission . Levaquin and added on Flagyl . CT mentioned that cholecystitis   RUQ US   Impression   1.  Cholelithiasis.  No biliary dilatation or findings of acute cholecystitis.       2.  Tiny free fluid within the right abdomen.       3.  Retroperitoneal cluster of small cysts measuring 2.8 cm in maximal   dimension posterior to the pancreas and anterior to the aorta. Continue on Flagyl  No abdominal pain on examination    Hypomagnesemia:   Replete as need       Leukopenia per transferred Hospital:   No leukopenia on admission  WBC 9.0 -8.8  hematology consult appreciated     History of Seizures :   C/w Vimpat, [address]   Neurology following, treat underlying infection. Monitor for return to baseline.  If no improvement may need MRI or EEG     Dementia: C/w Aricept and Namenda    Patient do not want to communicate  She does not want to eat  consult nutritionist     History of sacral decub   Continue wound care  Increase physical activity           DISPOSITION: Continue current plan of care, likely needs placement    Medications:  REVIEWED DAILY    Infusion Medications    sodium chloride      sodium chloride 75 mL/hr at 06/19/21 1600     Scheduled Medications    atorvastatin  80 mg Per NG tube Nightly    donepezil  10 mg Oral Nightly    levETIRAcetam  750 mg Oral BID    memantine  10 mg Oral BID    lacosamide  50 mg Oral BID    sodium chloride flush  5-40 mL Intravenous 2 times per day    enoxaparin  40 mg Subcutaneous Daily    ipratropium-albuterol  1 ampule Inhalation Q4H WA    levofloxacin  750 mg Intravenous Q24H    metroNIDAZOLE  500 mg Intravenous Q8H    [Held by provider] magnesium oxide  400 mg Oral Daily     PRN Meds: magnesium sulfate, LORazepam, sodium chloride flush, sodium chloride, ondansetron **OR** ondansetron, polyethylene glycol, acetaminophen **OR** acetaminophen    Labs:     Recent Labs     06/20/21  1537 06/21/21  0540 06/22/21  0549   WBC 9.6 8.8 6.0   HGB 8.8* 9.4* 9.2*   HCT 28.1* 29.6*  30.4* 29.0*   PLT 84* 82* 105*       Recent Labs     06/20/21  1537 06/21/21  0540 06/22/21  0549    139 140   K 4.1 3.8 3.5   * 111* 110*   CO2 24 22 21*   BUN 14 14 13   CREATININE 0.6 0.6 0.5   CALCIUM 8.8 8.9 8.7       Recent Labs     06/20/21  1537   PROT 5.1*   ALKPHOS 105*   ALT 31   AST 29   BILITOT 0.5       No results for input(s): INR in the last 72 hours. No results for input(s): [place] in the last 72 hours. Chronic labs:    Lab Results   Component Value Date    CHOL 176 07/25/2015    TRIG 42 11/21/2020    HDL 72 07/25/2015    LDLCALC 91 07/25/2015    TSH 1.270 07/25/2015    INR 1.0 11/18/2020       Radiology: REVIEWED DAILY    +++++++++++++++++++++++++++++++++++++++++++++++++  Everrett Senate, DO DO  [facility]/ [address]  +++++++++++++++++++++++++++++++++++++++++++++++++  NOTE: This report was transcribed using voice recognition software. Every effort was made to ensure accuracy; however, inadvertent computerized transcription errors may be present.

## 2021-06-23 LAB
ANION GAP SERPL CALCULATED.3IONS-SCNC: 8 MMOL/L (ref 7–16)
BASOPHILS ABSOLUTE: 0.01 E9/L (ref 0–0.2)
BASOPHILS RELATIVE PERCENT: 0.3 % (ref 0–2)
BUN BLDV-MCNC: 10 MG/DL (ref 6–23)
CALCIUM SERPL-MCNC: 8.6 MG/DL (ref 8.6–10.2)
CHLORIDE BLD-SCNC: 110 MMOL/L (ref 98–107)
CO2: 20 MMOL/L (ref 22–29)
CREAT SERPL-MCNC: 0.5 MG/DL (ref 0.5–1)
EOSINOPHILS ABSOLUTE: 0.09 E9/L (ref 0.05–0.5)
EOSINOPHILS RELATIVE PERCENT: 2.5 % (ref 0–6)
GFR AFRICAN AMERICAN: >60
GFR NON-AFRICAN AMERICAN: >60 ML/MIN/1.73
GLUCOSE BLD-MCNC: 75 MG/DL (ref 74–99)
HCT VFR BLD CALC: 30.7 % (ref 34–48)
HEMOGLOBIN: 9.8 G/DL (ref 11.5–15.5)
IMMATURE GRANULOCYTES #: 0.04 E9/L
IMMATURE GRANULOCYTES %: 1.1 % (ref 0–5)
LYMPHOCYTES ABSOLUTE: 1.3 E9/L (ref 1.5–4)
LYMPHOCYTES RELATIVE PERCENT: 36.6 % (ref 20–42)
MAGNESIUM: 1.9 MG/DL (ref 1.6–2.6)
MCH RBC QN AUTO: 31.3 PG (ref 26–35)
MCHC RBC AUTO-ENTMCNC: 31.9 % (ref 32–34.5)
MCV RBC AUTO: 98.1 FL (ref 80–99.9)
MONOCYTES ABSOLUTE: 0.32 E9/L (ref 0.1–0.95)
MONOCYTES RELATIVE PERCENT: 9 % (ref 2–12)
NEUTROPHILS ABSOLUTE: 1.79 E9/L (ref 1.8–7.3)
NEUTROPHILS RELATIVE PERCENT: 50.5 % (ref 43–80)
PATHOLOGIST REVIEW: NORMAL
PDW BLD-RTO: 14.4 FL (ref 11.5–15)
PLATELET # BLD: 105 E9/L (ref 130–450)
PMV BLD AUTO: 11.7 FL (ref 7–12)
POTASSIUM REFLEX MAGNESIUM: 4.1 MMOL/L (ref 3.5–5)
RBC # BLD: 3.13 E12/L (ref 3.5–5.5)
REASON FOR REJECTION: NORMAL
REJECTED TEST: NORMAL
SODIUM BLD-SCNC: 138 MMOL/L (ref 132–146)
WBC # BLD: 3.6 E9/L (ref 4.5–11.5)

## 2021-06-23 PROCEDURE — 6360000002 HC RX W HCPCS: Performed by: FAMILY MEDICINE

## 2021-06-23 PROCEDURE — 80048 BASIC METABOLIC PNL TOTAL CA: CPT

## 2021-06-23 PROCEDURE — 6370000000 HC RX 637 (ALT 250 FOR IP): Performed by: FAMILY MEDICINE

## 2021-06-23 PROCEDURE — 83735 ASSAY OF MAGNESIUM: CPT

## 2021-06-23 PROCEDURE — 6360000002 HC RX W HCPCS: Performed by: INTERNAL MEDICINE

## 2021-06-23 PROCEDURE — 2580000003 HC RX 258: Performed by: FAMILY MEDICINE

## 2021-06-23 PROCEDURE — 36415 COLL VENOUS BLD VENIPUNCTURE: CPT

## 2021-06-23 PROCEDURE — 2500000003 HC RX 250 WO HCPCS: Performed by: FAMILY MEDICINE

## 2021-06-23 PROCEDURE — 94640 AIRWAY INHALATION TREATMENT: CPT

## 2021-06-23 PROCEDURE — 1200000000 HC SEMI PRIVATE

## 2021-06-23 PROCEDURE — 85025 COMPLETE CBC W/AUTO DIFF WBC: CPT

## 2021-06-23 RX ADMIN — LEVOFLOXACIN 750 MG: 5 INJECTION, SOLUTION INTRAVENOUS at 17:00

## 2021-06-23 RX ADMIN — IPRATROPIUM BROMIDE AND ALBUTEROL SULFATE 1 AMPULE: .5; 3 SOLUTION RESPIRATORY (INHALATION) at 09:43

## 2021-06-23 RX ADMIN — MEMANTINE HYDROCHLORIDE 10 MG: 10 TABLET, FILM COATED ORAL at 20:39

## 2021-06-23 RX ADMIN — Medication 10 ML: at 20:40

## 2021-06-23 RX ADMIN — ENOXAPARIN SODIUM 40 MG: 40 INJECTION SUBCUTANEOUS at 08:48

## 2021-06-23 RX ADMIN — LACOSAMIDE 50 MG: 100 TABLET, FILM COATED ORAL at 09:01

## 2021-06-23 RX ADMIN — DONEPEZIL HYDROCHLORIDE 10 MG: 5 TABLET, FILM COATED ORAL at 20:38

## 2021-06-23 RX ADMIN — IPRATROPIUM BROMIDE AND ALBUTEROL SULFATE 1 AMPULE: .5; 3 SOLUTION RESPIRATORY (INHALATION) at 13:27

## 2021-06-23 RX ADMIN — METRONIDAZOLE 500 MG: 500 INJECTION, SOLUTION INTRAVENOUS at 04:28

## 2021-06-23 RX ADMIN — LEVETIRACETAM 750 MG: 500 TABLET, FILM COATED ORAL at 08:47

## 2021-06-23 RX ADMIN — LEVETIRACETAM 750 MG: 500 TABLET, FILM COATED ORAL at 20:39

## 2021-06-23 RX ADMIN — METRONIDAZOLE 500 MG: 500 INJECTION, SOLUTION INTRAVENOUS at 11:39

## 2021-06-23 RX ADMIN — METRONIDAZOLE 500 MG: 500 INJECTION, SOLUTION INTRAVENOUS at 20:38

## 2021-06-23 RX ADMIN — ATORVASTATIN CALCIUM 80 MG: 80 TABLET, FILM COATED ORAL at 20:38

## 2021-06-23 RX ADMIN — MAGNESIUM SULFATE HEPTAHYDRATE 1000 MG: 1 INJECTION, SOLUTION INTRAVENOUS at 01:15

## 2021-06-23 RX ADMIN — MEMANTINE HYDROCHLORIDE 10 MG: 10 TABLET, FILM COATED ORAL at 08:52

## 2021-06-23 RX ADMIN — LACOSAMIDE 50 MG: 100 TABLET, FILM COATED ORAL at 20:38

## 2021-06-23 ASSESSMENT — PAIN SCALES - GENERAL
PAINLEVEL_OUTOF10: 0
PAINLEVEL_OUTOF10: 0

## 2021-06-23 NOTE — PROGRESS NOTES
Patient has gotten out of bed many times throughout this shift. With patients bed alarm going off letting us know that she is out of bed. Patient is confused and has been redirected multiple times with no success. Though patient appears calm, patient does not show an understanding of these redirection attempts. Patient has been placed back into bed multiple times throughout shift. Tele-sitter is on and functioning, though has only alarmed a few times of the multiple times patient has gotten out of bed. Bed alarm is on and functioning at this time.

## 2021-06-23 NOTE — PLAN OF CARE
Problem: Falls - Risk of:  Goal: Will remain free from falls  Description: Will remain free from falls  Outcome: Met This Shift     Problem: Falls - Risk of:  Goal: Absence of physical injury  Description: Absence of physical injury  Outcome: Met This Shift     Problem: Skin Integrity:  Goal: Will show no infection signs and symptoms  Description: Will show no infection signs and symptoms  Outcome: Met This Shift     Problem: Skin Integrity:  Goal: Absence of new skin breakdown  Description: Absence of new skin breakdown  Outcome: Met This Shift     Problem: Musculor/Skeletal Functional Status  Goal: Highest potential functional level  Outcome: Met This Shift     Problem: Musculor/Skeletal Functional Status  Goal: Absence of falls  Outcome: Met This Shift

## 2021-06-23 NOTE — PLAN OF CARE
Problem: Skin Integrity:  Goal: Will show no infection signs and symptoms  Description: Will show no infection signs and symptoms  6/23/2021 1206 by Didi Perez  Outcome: Met This Shift  6/23/2021 0131 by Chace Sanitago RN  Outcome: Met This Shift  Goal: Absence of new skin breakdown  Description: Absence of new skin breakdown  6/23/2021 1206 by Seguro Surgicalpatricia Marine  Outcome: Met This Shift  6/23/2021 0131 by Chace Santiago RN  Outcome: Met This Shift

## 2021-06-23 NOTE — FLOWSHEET NOTE
Inpatient Wound Care(Initial consult) 5408A    Admit Date: 6/18/2021 10:41 PM    Reason for consult:  Sacrum    Significant history: Per H&P     Chief Complaint:  Fall         History Of Present Illness:    72 y.o. female with past medical history of dementia and seizures . Patient is a transfer from [facility] brought patient into the ED after a fall. Family was able to catch her and prevent  her from hitting her heed. They denied any seizure like activity , loss of consciousness . HPI limited due to dementia and patient is nonverbal  . EMS reported that patient 's oxygen saturation was 76% on room air . Patient was saturating 100% in ED  Laboratory data revealed mag 1.4 ,  CXR revealed bilateral opacities AST 41 ALT 51 WBC 1.7 PLPLT 99 Glucose 140 Trop 3 EKG sinus bradycardia . HGb 13.2 Patient was hypotensive in ED at 97/42 . CTA/P revealed bilateral infiltrate both lungs and possible enteritis and acute  cholecystitis not excluded     Findings:      06/23/21 1030   Skin Integrity   Skin Integrity Bruising   Location BUE   Skin Integrity Site 2   Skin Integrity Location 2   (dry, flaky, soft)   Location 2   (bilateral heels)   Wound 06/18/21 Sacrum   Date First Assessed/Time First Assessed: 06/18/21 2317   Present on Hospital Admission: Yes  Primary Wound Type: Pressure Injury  Location: Sacrum   Wound Image    Wound Etiology Pressure Stage  3   Dressing/Treatment Pharmaceutical agent (see MAR)   Wound Length (cm) 2 cm   Wound Width (cm) 1 cm   Wound Depth (cm) 0.2 cm   Wound Surface Area (cm^2) 2 cm^2   Change in Wound Size % (l*w) 50   Wound Volume (cm^3) 0.4 cm^3   Wound Healing % 0   Wound Assessment Pink/red  (yellow)   Drainage Amount None   Odor None   Naima-wound Assessment Maceration      **Informed Consent**     photos taken of wound and inserted into their chart as part of their permanent medical record for purposes of documentation, treatment management and/or medical review.    All Images taken on 6/23/21 of patient name: Sherryle Blue were transmitted and stored on secured [employer] located within [facility] by a registered [employer] Mobile Application Device.      Impression:  Sacrum: stage 3    Plan: Aquaphor  TAPS  Heel protectors  Low air loss module  Patient will need continued preventative care      Dedrick Ruiz RN 6/23/2021 11:08 AM

## 2021-06-23 NOTE — PLAN OF CARE
Problem: Falls - Risk of:  Goal: Will remain free from falls  Description: Will remain free from falls  6/23/2021 1640 by Katherin John  Outcome: Met This Shift  6/23/2021 1206 by Katherin John  Outcome: Met This Shift  Goal: Absence of physical injury  Description: Absence of physical injury  6/23/2021 1640 by Katherin John  Outcome: Met This Shift  6/23/2021 1206 by Katherin John  Outcome: Met This Shift     Problem: Skin Integrity:  Goal: Will show no infection signs and symptoms  Description: Will show no infection signs and symptoms  6/23/2021 1640 by Katherin John  Outcome: Met This Shift  6/23/2021 1206 by Katherin John  Outcome: Met This Shift  Goal: Absence of new skin breakdown  Description: Absence of new skin breakdown  6/23/2021 1640 by Katherin John  Outcome: Met This Shift  6/23/2021 1206 by Katherin John  Outcome: Met This Shift

## 2021-06-23 NOTE — PROGRESS NOTES
Hospitalist Progress Note      SYNOPSIS: Patient admitted on 2021 female with past medical history of dementia and seizures . Patient is a transfer from [facility] brought patient into the ED after a fall. Family was able to catch her and prevent  her from hitting her heed. They denied any seizure like activity , loss of consciousness . HPI limited due to dementia and patient is nonverbal  . EMS reported that patient 's oxygen saturation was 76% on room air . Patient was saturating 100% in ED  Laboratory data revealed mag 1.4 ,  CXR revealed bilateral opacities AST 41 ALT 51 WBC 1.7 PLPLT 99 Glucose 140 Trop 3 EKG sinus bradycardia . HGb 13.2 Patient was hypotensive in ED at 97/42 . CTA/P revealed bilateral infiltrate both lungs and possible enteritis and acute  cholecystitis not excluded     SUBJECTIVE:  Stable overnight. No other overnight issues reported. Patient seen and examined  Records reviewed. Answers yes or no questions  Denies abdominal pain   Was trying to get out of bed last night         Temp (24hrs), Av.6 °F (36.4 °C), Min:96.9 °F (36.1 °C), Max:98.2 °F (36.8 °C)    DIET: ADULT DIET; Regular  Adult Oral Nutrition Supplement; Standard High Calorie/High Protein Oral Supplement  CODE: Full Code    Intake/Output Summary (Last 24 hours) at 2021 0842  Last data filed at 2021 1300  Gross per 24 hour   Intake 60 ml   Output    Net 60 ml       Review of Systems  Limited due to nonverbal      OBJECTIVE:    /88   Pulse 60   Temp 97.3 °F (36.3 °C)   Resp 16   Ht 5' 5\" (1.651 m)   Wt 122 lb 4.8 oz (55.5 kg)   SpO2 97%   BMI 20.35 kg/m²     General appearance:  awake, alert, does not answer questioning  HEENT:  Conjunctivae/corneas clear. Neck: Supple. No jugular venous distention.    Respiratory: symmetrical; clear to auscultation bilaterally; no wheezes; no rhonchi; no rales  Cardiovascular: rhythm regular; rate controlled; no murmurs  Abdomen: Soft, nontender, nondistended  Extremities:  peripheral pulses present; no peripheral edema; no ulcers  Musculoskeletal: No clubbing, cyanosis, no bilateral lower extremity edema. Brisk capillary refill. Skin:  No rashes  on visible skin  Neurologic: awake, alert     ASSESSMENT and PLAN:  Community Acquired Pneumonia  patient is a transfer from [place] [address] and CT revealed bilateral opacities . Patient was saturating >90% on room air on admission . She received Rocephin on admission  Continued on IV Levaquin      Acute Enteritis :   as on CT on admission . Levaquin and added on Flagyl . CT mentioned that cholecystitis   RUQ US   Impression   1.  Cholelithiasis.  No biliary dilatation or findings of acute cholecystitis.       2.  Tiny free fluid within the right abdomen.       3.  Retroperitoneal cluster of small cysts measuring 2.8 cm in maximal   dimension posterior to the pancreas and anterior to the aorta. Continue on Flagyl  No abdominal pain on examination    Hypomagnesemia:   Replete as need       Leukopenia per transferred Hospital:   No leukopenia on admission  WBC 9.0 -8.8  hematology consult appreciated     History of Seizures :   C/w [place], [address]   Neurology following, treat underlying infection. Monitor for return to baseline.  If no improvement may need MRI or EEG     Dementia: C/w Aricept and Namenda    Patient do not want to communicate  She does not want to eat  consult nutritionist     History of sacral decub   Continue wound care  Increase physical activity           DISPOSITION: Continue current plan of care, likely needs placement    Medications:  REVIEWED DAILY    Infusion Medications    sodium chloride      sodium chloride 75 mL/hr at 06/22/21 1609     Scheduled Medications    atorvastatin  80 mg Per NG tube Nightly    donepezil  10 mg Oral Nightly    levETIRAcetam  750 mg Oral BID    memantine  10 mg Oral BID    lacosamide  50 mg Oral BID    sodium chloride flush  5-40 mL

## 2021-06-24 LAB
ALBUMIN SERPL-MCNC: 2.8 G/DL (ref 3.5–5.2)
ALP BLD-CCNC: 61 U/L (ref 35–104)
ALT SERPL-CCNC: 26 U/L (ref 0–32)
ANION GAP SERPL CALCULATED.3IONS-SCNC: 6 MMOL/L (ref 7–16)
AST SERPL-CCNC: 31 U/L (ref 0–31)
BILIRUB SERPL-MCNC: 0.4 MG/DL (ref 0–1.2)
BLOOD CULTURE, ROUTINE: NORMAL
BUN BLDV-MCNC: 6 MG/DL (ref 6–23)
CALCIUM SERPL-MCNC: 8.5 MG/DL (ref 8.6–10.2)
CHLORIDE BLD-SCNC: 109 MMOL/L (ref 98–107)
CO2: 25 MMOL/L (ref 22–29)
CREAT SERPL-MCNC: 0.5 MG/DL (ref 0.5–1)
CULTURE, BLOOD 2: NORMAL
GFR AFRICAN AMERICAN: >60
GFR NON-AFRICAN AMERICAN: >60 ML/MIN/1.73
GLUCOSE BLD-MCNC: 75 MG/DL (ref 74–99)
HCT VFR BLD CALC: 32.5 % (ref 34–48)
HEMOGLOBIN: 10.1 G/DL (ref 11.5–15.5)
MCH RBC QN AUTO: 30.7 PG (ref 26–35)
MCHC RBC AUTO-ENTMCNC: 31.1 % (ref 32–34.5)
MCV RBC AUTO: 98.8 FL (ref 80–99.9)
PDW BLD-RTO: 14.4 FL (ref 11.5–15)
PLATELET # BLD: 117 E9/L (ref 130–450)
PMV BLD AUTO: 11.3 FL (ref 7–12)
POTASSIUM SERPL-SCNC: 4.1 MMOL/L (ref 3.5–5)
RBC # BLD: 3.29 E12/L (ref 3.5–5.5)
SODIUM BLD-SCNC: 140 MMOL/L (ref 132–146)
TOTAL PROTEIN: 5.7 G/DL (ref 6.4–8.3)
WBC # BLD: 3.6 E9/L (ref 4.5–11.5)

## 2021-06-24 PROCEDURE — 97530 THERAPEUTIC ACTIVITIES: CPT

## 2021-06-24 PROCEDURE — 36415 COLL VENOUS BLD VENIPUNCTURE: CPT

## 2021-06-24 PROCEDURE — 2500000003 HC RX 250 WO HCPCS: Performed by: FAMILY MEDICINE

## 2021-06-24 PROCEDURE — 80053 COMPREHEN METABOLIC PANEL: CPT

## 2021-06-24 PROCEDURE — 6370000000 HC RX 637 (ALT 250 FOR IP): Performed by: FAMILY MEDICINE

## 2021-06-24 PROCEDURE — 2580000003 HC RX 258: Performed by: FAMILY MEDICINE

## 2021-06-24 PROCEDURE — 1200000000 HC SEMI PRIVATE

## 2021-06-24 PROCEDURE — 97535 SELF CARE MNGMENT TRAINING: CPT

## 2021-06-24 PROCEDURE — 6370000000 HC RX 637 (ALT 250 FOR IP): Performed by: INTERNAL MEDICINE

## 2021-06-24 PROCEDURE — 6360000002 HC RX W HCPCS: Performed by: FAMILY MEDICINE

## 2021-06-24 PROCEDURE — 85027 COMPLETE CBC AUTOMATED: CPT

## 2021-06-24 PROCEDURE — 94640 AIRWAY INHALATION TREATMENT: CPT

## 2021-06-24 RX ORDER — DOXYCYCLINE HYCLATE 100 MG
100 TABLET ORAL 2 TIMES DAILY
Qty: 6 TABLET | Refills: 0 | DISCHARGE
Start: 2021-06-24 | End: 2021-06-27

## 2021-06-24 RX ORDER — CEFDINIR 300 MG/1
300 CAPSULE ORAL 2 TIMES DAILY
Qty: 6 CAPSULE | Refills: 0 | DISCHARGE
Start: 2021-06-24 | End: 2021-06-27

## 2021-06-24 RX ADMIN — LEVETIRACETAM 750 MG: 500 TABLET, FILM COATED ORAL at 21:50

## 2021-06-24 RX ADMIN — MEMANTINE HYDROCHLORIDE 10 MG: 10 TABLET, FILM COATED ORAL at 13:50

## 2021-06-24 RX ADMIN — LACOSAMIDE 50 MG: 100 TABLET, FILM COATED ORAL at 21:50

## 2021-06-24 RX ADMIN — PETROLATUM: 420 OINTMENT TOPICAL at 22:06

## 2021-06-24 RX ADMIN — PETROLATUM: 420 OINTMENT TOPICAL at 09:55

## 2021-06-24 RX ADMIN — METRONIDAZOLE 500 MG: 500 INJECTION, SOLUTION INTRAVENOUS at 13:49

## 2021-06-24 RX ADMIN — IPRATROPIUM BROMIDE AND ALBUTEROL SULFATE 1 AMPULE: .5; 3 SOLUTION RESPIRATORY (INHALATION) at 09:29

## 2021-06-24 RX ADMIN — Medication 10 ML: at 22:05

## 2021-06-24 RX ADMIN — METRONIDAZOLE 500 MG: 500 INJECTION, SOLUTION INTRAVENOUS at 21:50

## 2021-06-24 RX ADMIN — MEMANTINE HYDROCHLORIDE 10 MG: 10 TABLET, FILM COATED ORAL at 21:50

## 2021-06-24 RX ADMIN — METRONIDAZOLE 500 MG: 500 INJECTION, SOLUTION INTRAVENOUS at 04:11

## 2021-06-24 RX ADMIN — LEVOFLOXACIN 750 MG: 5 INJECTION, SOLUTION INTRAVENOUS at 17:03

## 2021-06-24 RX ADMIN — LORAZEPAM 1 MG: 1 TABLET ORAL at 21:50

## 2021-06-24 RX ADMIN — IPRATROPIUM BROMIDE AND ALBUTEROL SULFATE 1 AMPULE: .5; 3 SOLUTION RESPIRATORY (INHALATION) at 15:20

## 2021-06-24 RX ADMIN — ATORVASTATIN CALCIUM 80 MG: 80 TABLET, FILM COATED ORAL at 22:03

## 2021-06-24 RX ADMIN — LACOSAMIDE 50 MG: 100 TABLET, FILM COATED ORAL at 13:49

## 2021-06-24 RX ADMIN — DONEPEZIL HYDROCHLORIDE 10 MG: 5 TABLET, FILM COATED ORAL at 22:03

## 2021-06-24 ASSESSMENT — PAIN SCALES - PAIN ASSESSMENT IN ADVANCED DEMENTIA (PAINAD)
CONSOLABILITY: 0
BREATHING: 0
FACIALEXPRESSION: 0
BODYLANGUAGE: 0
TOTALSCORE: 0
NEGVOCALIZATION: 0
BODYLANGUAGE: 0
FACIALEXPRESSION: 0
NEGVOCALIZATION: 0
BREATHING: 0
CONSOLABILITY: 0
TOTALSCORE: 0

## 2021-06-24 ASSESSMENT — PAIN SCALES - GENERAL
PAINLEVEL_OUTOF10: 0

## 2021-06-24 NOTE — PROGRESS NOTES
Occupational Therapy  OT BEDSIDE TREATMENT NOTE   [facility] [address] [facility]  [address] [place] [address]      Date:2021  Patient Name: Rafaela Moya  MRN: [id number]  : 1955  Room: 7259/6263-J     Referring Provider:Melody Waller MD  Specific Provider Orders/Date: OT evaluation and treat 21     Evaluating OT: Don Dickson OTR/L #[id number]     Diagnosis: pneumonia       Surgery: craniotomy      Pertinent Medical History: dementia, seizure, SDH  Past Medical History        Past Medical History:   Diagnosis Date    Dementia ([facility]      Seizure ([facility]      Subdural hematoma (HCC)                 Precautions:  Fall Risk, safety , TSM, incontinent, hypotention  Assessment of current deficits    [x]? Functional mobility             [x]?ADLs           []? Strength                  [x]? Cognition   [x]? Functional transfers           [x]? IADLs         [x]? Safety Awareness   [x]? Endurance   [x]? Fine Coordination              [x]? Balance      []? Vision/perception   []? Sensation     [x]? Gross Motor Coordination  []? ROM           []? Delirium                   [x]?  Motor Control      OT PLAN OF CARE   OT POC based on physician orders, patient diagnosis and results of clinical assessment     Frequency/Duration   1-3 days/wk for 1 week PRN   Specific OT Treatment Interventions to include:   * Instruction/training on adapted ADL techniques and AE recommendations to increase functional independence within precautions       * Training on energy conservation strategies, correct breathing pattern and techniques to improve independence/tolerance for self-care routine  * Functional transfer/mobility training/DME recommendations for increased independence, safety, and fall prevention  * Patient/Family education to increase follow through with safety techniques and functional independence  * Recommendation of environmental modifications for increased safety with functional transfers/mobility and ADLs  * Cognitive retraining/development of therapeutic activities to improve problem solving, judgement, memory, and attention for increased safety/participation in ADL/IADL tasks  * Therapeutic activities to facilitate/challenge dynamic balance, stand tolerance for increased safety and independence with ADLs  * Therapeutic activities to facilitate gross/fine motor skills for increased independence with ADLs     OTMRS Modified Kassie Scale (MRS)  Score     Description  0             No symptoms  1             No significant disability despite symptoms  2             Slight disability; able to look after own affairs  3             Moderate disability; able to ambulate without assist/ requires assist with ADLs  4             Moderate/Severe disability;requires assist to ambulate/assist with ADLs  5             Severe disability;bedridden/incontinent   6               Score:  5     Recommended Adaptive Equipment: TBD      Home Living: Pt is a poor historian in 2020  lives with sister  in a 2 story home with level entry and no steps.    Bathroom setup: per chart tub shower  Equipment owned: per chart ww     Prior Level of Function: unable to state     Pain Level: No pain reported at this time   Cognition: A&O: 1/4; Follows 0-1 step directions infrequent and inconsistent              Memory:  poor              Sequencing:  poor              Problem solving:  poor              Judgement/safety:  poor                Functional Assessment:  AM-PAC Daily Activity Raw Score:     Initial Eval Status  Date: 21 Treatment Status  Date: 21 STGs = LTGs  Time frame: 1-3 days   Feeding Max A able to swallow and take food off spoon, unable to scoop or hold utensil  SBA Mod A    Grooming Dependent attended to task yet could not hold or sequence task of hand washing  Dependent  To wash face seated upright in bed (refuses attempt) Max A    UB Dressing dependent Max A  To don/doff gown seated EOB  Max A    LB Dressing Dependent  Max A  To don/doff socks seated upright in bed  Max A    Bathing dependent Max A  LB bathing seated and standing for posterior natalee area  Max A    Toileting Dependent in continent of bowel Max A  For hygiene, incontinent of bowel  Mod A to BSC   Bed Mobility  Supine to sit: max A x2  Sit to supine:  Max A x2 Min A- supine>sit  Mod A x 2- sit>supine  Educated pt on technique to increase independence.    Supine to sit: mod A   Sit to supine: mod A    Functional Transfers Max A x2 and mod A x2 when patient initiated task herself Min A- sit<->stand  Cuing for safety Min A with ww   Functional Mobility N/t patient was incontinent of bowel with standing activity Mod A x 2  Mod A initially decreasing to Mod A x 2 with fatigue and increased confusion   Mod A with ww   Balance Sitting:     Static:  Min A     Dynamic:min A   Standing: min A able to stand with out UE support  Sitting:     Static:  SBA     Dynamic:min A   Standing: mod A                                                                       Activity Tolerance Poor due to limited ability to follow commands or tolerate physical cueing O2 WFL RA Poor+  fair   Visual/  Perceptual Glasses: yes unable to assess           Safety poor  poor                                 Poor+        Comments: Upon arrival pt supine in bed. Pt educated on techniques to increase independence and safety during ADL's, bed mobility, and functional transfers. At end of session pt left seated upright in bed, call light within reach. · Pt has made fair progress towards set goals.      · Continue with current plan of care    Treatment Time In: 10:06            Treatment Time Out: 10:30             Treatment Charges: Mins Units   Ther Ex  63634     Manual Therapy 60315     Thera Activities 79568 10 1   ADL/Home Mgt 53589 14 1   Neuro Re-ed 17540     Group Therapy      Orthotic manage/training  57543     Non-Billable Time     Total [facility] Treatment  [address] [place] [address]

## 2021-06-24 NOTE — PROGRESS NOTES
Hospitalist Progress Note      SYNOPSIS: Patient admitted on 2021 female with past medical history of dementia and seizures . Patient is a transfer from [facility] brought patient into the ED after a fall. Family was able to catch her and prevent  her from hitting her heed. They denied any seizure like activity , loss of consciousness . HPI limited due to dementia and patient is nonverbal  . EMS reported that patient 's oxygen saturation was 76% on room air . Patient was saturating 100% in ED  Laboratory data revealed mag 1.4 ,  CXR revealed bilateral opacities AST 41 ALT 51 WBC 1.7 PLPLT 99 Glucose 140 Trop 3 EKG sinus bradycardia . HGb 13.2 Patient was hypotensive in ED at 97/42 . CTA/P revealed bilateral infiltrate both lungs and possible enteritis and acute  cholecystitis not excluded     SUBJECTIVE:  Stable overnight. No other overnight issues reported. Patient seen and examined  Records reviewed. Answers yes or no questions  Denies abdominal pain   Family now wants patient to go to rehab         Temp (24hrs), Av.5 °F (36.4 °C), Min:97.5 °F (36.4 °C), Max:97.5 °F (36.4 °C)    DIET: ADULT DIET; Regular  Adult Oral Nutrition Supplement; Standard High Calorie/High Protein Oral Supplement  CODE: Full Code    Intake/Output Summary (Last 24 hours) at 2021 1612  Last data filed at 2021 1421  Gross per 24 hour   Intake 420 ml   Output    Net 420 ml       Review of Systems  Limited due to nonverbal      OBJECTIVE:    /82   Pulse 62   Temp 97.5 °F (36.4 °C) (Temporal)   Resp 16   Ht 5' 5\" (1.651 m)   Wt 122 lb 4.8 oz (55.5 kg)   SpO2 99%   BMI 20.35 kg/m²     General appearance:  awake, alert, in chair, answers yes or no questions, this is patient baseline  HEENT:  Conjunctivae/corneas clear. Neck: Supple. No jugular venous distention.    Respiratory: symmetrical; clear to auscultation bilaterally; no wheezes; no rhonchi; no rales  Cardiovascular: rhythm regular; rate controlled; no murmurs  Abdomen: Soft, nontender, nondistended  Extremities:  peripheral pulses present; no peripheral edema; no ulcers  Musculoskeletal: No clubbing, cyanosis, no bilateral lower extremity edema. Brisk capillary refill. Skin:  No rashes  on visible skin  Neurologic: awake, alert     ASSESSMENT and PLAN:  Community Acquired Pneumonia  patient is a transfer from [place] [address] and CT revealed bilateral opacities . Patient was saturating >90% on room air on admission . She received Rocephin on admission  Continued on IV Levaquin      Acute Enteritis :   as on CT on admission . Levaquin and added on Flagyl . CT mentioned that cholecystitis   RUQ US   Impression   1.  Cholelithiasis.  No biliary dilatation or findings of acute cholecystitis.       2.  Tiny free fluid within the right abdomen.       3.  Retroperitoneal cluster of small cysts measuring 2.8 cm in maximal   dimension posterior to the pancreas and anterior to the aorta. Continue on Flagyl  No abdominal pain on examination    Hypomagnesemia:   Replete as need       Leukopenia per transferred Hospital:   No leukopenia on admission  WBC 9.0 -8.8  hematology consult appreciated     History of Seizures :   C/w [place], [address]   Neurology following, treat underlying infection. Has returned to baseline     Dementia: C/w Aricept and Namenda    Patient do not want to communicate  She does not want to eat  consult nutritionist     History of sacral decub   Continue wound care  Increase physical activity           DISPOSITION: Awaiting placement.   Okay for discharge    Medications:  REVIEWED DAILY    Infusion Medications    sodium chloride      sodium chloride 75 mL/hr at 06/22/21 1609     Scheduled Medications    white petrolatum   Topical BID    atorvastatin  80 mg Per NG tube Nightly    donepezil  10 mg Oral Nightly    levETIRAcetam  750 mg Oral BID    memantine  10 mg Oral BID    lacosamide  50 mg Oral BID    sodium chloride flush  5-40 mL Intravenous 2 times per day    enoxaparin  40 mg Subcutaneous Daily    ipratropium-albuterol  1 ampule Inhalation Q4H WA    levofloxacin  750 mg Intravenous Q24H    metroNIDAZOLE  500 mg Intravenous Q8H    [Held by provider] magnesium oxide  400 mg Oral Daily     PRN Meds: white petrolatum **AND** white petrolatum, magnesium sulfate, LORazepam, sodium chloride flush, sodium chloride, ondansetron **OR** ondansetron, polyethylene glycol, acetaminophen **OR** acetaminophen    Labs:     Recent Labs     06/22/21  0549 06/23/21  1041 06/24/21  1114   WBC 6.0 3.6* 3.6*   HGB 9.2* 9.8* 10.1*   HCT 29.0* 30.7* 32.5*   * 105* 117*       Recent Labs     06/22/21  0549 06/23/21  0615 06/24/21  1114    138 140   K 3.5 4.1 4.1   * 110* 109*   CO2 21* 20* 25   BUN 13 10 6   CREATININE 0.5 0.5 0.5   CALCIUM 8.7 8.6 8.5*       Recent Labs     06/24/21  1114   PROT 5.7*   ALKPHOS 61   ALT 26   AST 31   BILITOT 0.4       No results for input(s): INR in the last 72 hours. No results for input(s): Umberto Eucha in the last 72 hours. Chronic labs:    Lab Results   Component Value Date    CHOL 176 07/25/2015    TRIG 42 11/21/2020    HDL 72 07/25/2015    LDLCALC 91 07/25/2015    TSH 1.270 07/25/2015    INR 1.0 11/18/2020       Radiology: REVIEWED DAILY    +++++++++++++++++++++++++++++++++++++++++++++++++  Kay Juárez DO DO  [address], [facility]  +++++++++++++++++++++++++++++++++++++++++++++++++  NOTE: This report was transcribed using voice recognition software. Every effort was made to ensure accuracy; however, inadvertent computerized transcription errors may be present.

## 2021-06-24 NOTE — PROGRESS NOTES
Patient uncooperative and combative this morning. Refused attempts to get her vital signs and take her morning medications.

## 2021-06-24 NOTE — CARE COORDINATION
Spoke to the pt's daughter, Elayne Gold. She has decided to have her mother go to rehab. She chose [facility]. Referral made. clinicals faxed to [phone].  Riky Fajardo -[phone]

## 2021-06-24 NOTE — PROGRESS NOTES
Physical Therapy  Facility/Department: [facility]  Daily Treatment Note  NAME: Corina Lee  : 1955  MRN: [id number]    Date of Service: 2021     Evaluating PT:  Bo Covington PT, DPT  [id number]  Room #:  4602/9109-T  Diagnosis:  Pneumonia [J18.9]   PMHx/PSHx:  Dementia, Seizure, SDH  Procedure/Surgery:    Precautions:  Falls, Cognition, TSM  Equipment Needs:       SUBJECTIVE:     Pt is a poor historian. Unable to provide history on PLOF or home set up. Equipment Owned:      OBJECTIVE:    Initial Evaluation  Date: 21 Treatment Date: 21 Short Term/ Long Term   Goals   AM-PAC 6 Clicks 47/      Was pt agreeable to Eval/treatment? Yes yes      Does pt have pain? No c/o pain  no c/o pain     Bed Mobility  Rolling: MaxA  Supine to sit: MaxA x 2  Sit to supine: MaxA x 2  Scooting: MaxA   Rolling: Solitario (when cooperating)  Supine to sit: Solitario  Sit to supine: ModA x 2  Scooting: MaxA  Rolling: Supervision  Supine to sit: Solitario  Sit to supine: Solitario  Scooting: Solitario   Transfers Sit to stand: MaxA x 2  Stand to sit: MaxA x 2  Stand pivot: NT Sit to stand: ModA x 2  Stand to sit: ModA x 2  Stand pivot: ModA x 2  HHA Sit to stand: ModA  Stand to sit: ModA  Stand pivot: ModA   Ambulation    NT   >5' ModA [employer]   Stair negotiation: ascended and descended NT       ROM BUE:  Defer to OT  BLE:  WFL       Strength BUE:  Defer to OT  BLE:  Poor command follow  BLE AROM observed when initiating sit to stand.   Improve strength 1 MMT grade   Balance Sitting EOB:  Solitario  Dynamic Standing:  MaxA x 2 [employer]  Sitting EOB:  Solitario  Dynamic Standing:  ModA x 2 HHA Sitting EOB:  Independent     Dynamic Standing:  Modified Independent  AAD      Pt is A & O x (self). Pleasantly confused at this time. Requiring hand over hand facilitation to mobilize functionally  Sensation:  Unable to assess accurately.   Edema:  WNL      Patient education  Pt educated on role of PT    Patient response to education:   Pt verbalized understanding Pt demonstrated skill Pt requires further education in this area   x x x     ASSESSMENT:    Comments:  Pt received in supine agreeable to PT. Pt pleasantly confused with poor command follow. Pt required some hand over hand facilitation to mobilize functionally. Noted to be incontinent of large amount of stool. Required assistance to steady in stance while 2nd person performed pericare. Pt ambulation pattern unsteady with scissor gait. Spo2 99%. Patient would benefit from continued skilled PT to maximize functional mobility independence. Alarm activated    Treatment:  Patient practiced and was instructed in the following treatment:     Bed mobility- verbal cues to facilitate independence   Functional transfers-Verbal cues for proper positioning and sequencing to perform transfers safely with maximum independence.  Gait training-Verbal cues for proper positioning and sequencing to maximize functional mobility independence. PLAN:    Patient is making good progress towards established goals. Will continue with current POC.       Time in  1005  Time out  1028    Total Treatment Time  23 minutes     CPT codes:  [] Gait training 27369 0 minutes  [] Manual therapy 12333 0 minutes  [x] Therapeutic activities 17767 23 minutes  [] Therapeutic exercises 34230 0 minutes  [] Neuromuscular reeducation 57750 0 minutes    Billy Shane PT, DPT  [id number]

## 2021-06-24 NOTE — CARE COORDINATION
Spoke to the pt's dtr, Janki. She will discuss discharge plan with her aunt, who is the pt's caregiver. Will follow.  Emerita Chua -[phone]

## 2021-06-25 VITALS
DIASTOLIC BLOOD PRESSURE: 72 MMHG | OXYGEN SATURATION: 96 % | RESPIRATION RATE: 16 BRPM | HEART RATE: 65 BPM | SYSTOLIC BLOOD PRESSURE: 130 MMHG | BODY MASS INDEX: 20.37 KG/M2 | WEIGHT: 122.3 LBS | HEIGHT: 65 IN | TEMPERATURE: 97.6 F

## 2021-06-25 LAB — SARS-COV-2, NAAT: NOT DETECTED

## 2021-06-25 PROCEDURE — 6370000000 HC RX 637 (ALT 250 FOR IP): Performed by: FAMILY MEDICINE

## 2021-06-25 PROCEDURE — 94640 AIRWAY INHALATION TREATMENT: CPT

## 2021-06-25 PROCEDURE — 87635 SARS-COV-2 COVID-19 AMP PRB: CPT

## 2021-06-25 PROCEDURE — 2500000003 HC RX 250 WO HCPCS: Performed by: FAMILY MEDICINE

## 2021-06-25 RX ADMIN — METRONIDAZOLE 500 MG: 500 INJECTION, SOLUTION INTRAVENOUS at 04:31

## 2021-06-25 RX ADMIN — MEMANTINE HYDROCHLORIDE 10 MG: 10 TABLET, FILM COATED ORAL at 10:49

## 2021-06-25 RX ADMIN — PETROLATUM: 420 OINTMENT TOPICAL at 10:57

## 2021-06-25 RX ADMIN — IPRATROPIUM BROMIDE AND ALBUTEROL SULFATE 1 AMPULE: .5; 3 SOLUTION RESPIRATORY (INHALATION) at 12:47

## 2021-06-25 ASSESSMENT — PAIN SCALES - GENERAL: PAINLEVEL_OUTOF10: 0

## 2021-06-25 NOTE — DISCHARGE INSTR - COC
Continuity of Care Form    Patient Name: Lamberto Rosado   :  1955  MRN:  [id number]    Admit date:  2021  Discharge date:  2021    Code Status Order: Full Code   Advance Directives:   [facility] Documentation       Date/Time Healthcare Directive Type of Healthcare Directive Copy in [address] Agent's Name Healthcare Agent's Phone Number    21 0514  No, patient does not have an advance directive for healthcare treatment -- -- -- -- --            Admitting Physician:  Naila Reid MD  PCP: Lore Bass MD    [facility]/2086-T  Discharging Unit Phone Number: [phone]    Emergency Contact:   Extended Emergency Contact Information  Primary Emergency Contact: [facility]  Address: [address] [facility], [address] Phone: [phone]  Relation: Child  Secondary Emergency Contact: Francy Escalona  Address: [facility] [address] [facility] Phone: [phone]  Relation: Brother/Sister    Past Surgical History:  Past Surgical History:   Procedure Laterality Date    CRANIOTOMY Left 2020    CRANIOTOMY BRANDIE HOLES performed by Teresa Hastings MD at [address] [place] [address]       Immunization History: There is no immunization history on file for this patient.     Active Problems:  Patient Active Problem List   Diagnosis Code    Seizure ([address] R56.9    Subdural hematoma ([address] S06.5X9A    Head injury S09.90XA    Dysphagia R13.10    Dementia ([address] F03.90    Pneumonia J18.9    Moderate protein-calorie malnutrition ([address] E44.0       Isolation/Infection:   Isolation            No Isolation          Patient Infection Status       Infection Onset Added Last Indicated Last Indicated By Review Planned Expiration Resolved Resolved By    None active    Resolved    COVID-19 Rule Out 21 Respiratory Panel, Molecular, with COVID-19 (Restricted: peds pts or suitable admitted adults) (Ordered)   21 Rule-Out Test Resulted    COVID-19 20 Covid-19 Ambulatory   21     COVID-19 Rule Out 20 COVID-19 (Ordered)   20 Rule-Out Test Resulted            Nurse Assessment:  Last Vital Signs: /74   Pulse 68   Temp 97.5 °F (36.4 °C) (Temporal)   Resp 16   Ht 5' 5\" (1.651 m)   Wt 122 lb 4.8 oz (55.5 kg)   SpO2 96%   BMI 20.35 kg/m²     Last documented pain score (0-10 scale): Pain Level: 0  Last Weight:   Wt Readings from Last 1 Encounters:   21 122 lb 4.8 oz (55.5 kg)     Mental Status:  alert    IV Access:  - None    Nursing Mobility/ADLs:  Walking   Assisted  Transfer  Assisted  Bathing  Assisted  Dressing  Assisted  Toileting  Assisted  Feeding  Independent, needs set up and encouragement  Med Admin  Assisted  Med Delivery   whole    Wound Care Documentation and Therapy:  Wound 20 Head Left subdural drain sutured site (Active)   Number of days: 217       Wound 21 Sacrum (Active)   Wound Image   21 1030   Wound Etiology Pressure Stage  3 21 0801   Dressing Status Other (Comment) 21 1108   Wound Cleansed Cleansed with saline 21 2352   Dressing/Treatment Pharmaceutical agent (see MAR) 21 0801   Wound Length (cm) 2 cm 21 1533   Wound Width (cm) 1 cm 21 1533   Wound Depth (cm) 0.1 cm 21 1533   Wound Surface Area (cm^2) 2 cm^2 21 1533   Change in Wound Size % (l*w) 50 21 1533   Wound Volume (cm^3) 0.2 cm^3 21 1533   Wound Healing % 50 21 1533   Wound Assessment Pink/red 21 0801   Drainage Amount None 21 0801   Odor None 21 1030   Naima-wound Assessment Maceration 21 1030   Number of days: 6        Elimination:  Continence:   · Bowel: No  · Bladder: No  Urinary Catheter: None   Colostomy/Ileostomy/Ileal Conduit: No       Date of Last BM: week    Physician Certification: I certify the above information and transfer of Rocael Zaragoza  is necessary for the continuing treatment of the diagnosis listed and that she requires Surjit Seay for less 30 days.      Update Admission H&P: No change in H&P    PHYSICIAN SIGNATURE:  Electronically signed by Magda Sandhu DO on 6/25/21 at 12:02 PM EDT

## 2021-06-25 NOTE — PROGRESS NOTES
Nurse to nurse report called to Marilee Weaver RN of [facility]  ( # [phone]) and faxed AVS to # [phone].

## 2021-06-25 NOTE — PLAN OF CARE

## 2021-06-25 NOTE — CARE COORDINATION
XIANG [facility] has accepted the pt. JOANNE YOU completed. COVID neg. Transportation has been arranged for 1:00 PM with [facility]. Call placed to sister, Tessa Robles, to inform her of the time. She will contact the pt's daughter, since she is at work. Nursing and facility informed of the time. PASRR, AVS and covid test result faxed to Richard Cm.. [phone].  Britany Bourne [phone]

## 2021-06-25 NOTE — DISCHARGE SUMMARY
Discharge Summary    Admit date: 6/18/2021    Discharge date and time: No discharge date for patient encounter. Admitting Physician: Harley Caballero DO     Consultants: wound care, hematology    Admission Diagnoses:  CAP    Discharge Diagnoses and Hospital Course:  Community Acquired Pneumonia  patient is a transfer from [place] [address] and CT revealed bilateral opacities . Patient was saturating >90% on room air on admission . She received Rocephin on admission  Continued on IV Levaquin  DC with omicef/doxy        Acute Enteritis :   as on CT on admission . Levaquin and added on Flagyl . CT mentioned that cholecystitis   RUQ US   Impression   1.  Cholelithiasis.  No biliary dilatation or findings of acute cholecystitis.       2.  Tiny free fluid within the right abdomen.       3.  Retroperitoneal cluster of small cysts measuring 2.8 cm in maximal   dimension posterior to the pancreas and anterior to the aorta. No abdominal pain on examination     Hypomagnesemia:   Repleted     Leukopenia per transferred Hospital:   No leukopenia on admission  WBC 9.0 -8.8  hematology consult appreciated     History of Seizures :   C/w Vimpat, [address]   Neurology following, treat underlying infection. Has returned to baseline     Dementia: C/w Aricept and Namenda    Patient do not want to communicate  She does not want to eat  Needs help eating     History of sacral decub   Continue wound care  Increase physical activity         Discharge Exam:  Vitals:    06/25/21 0801   BP: 130/72   Pulse: 65   Resp: 16   Temp: 97.6 °F (36.4 °C)   SpO2: 96%       General appearance:  awake, alert, in chair, answers yes or no questions, this is patient baseline  HEENT:  Conjunctivae/corneas clear. Neck: Supple. No jugular venous distention.    Respiratory: symmetrical; clear to auscultation bilaterally; no wheezes; no rhonchi; no rales  Cardiovascular: rhythm regular; rate controlled; no murmurs  Abdomen: Soft, nontender, tablet  Commonly known as: KEPPRA     LORazepam 1 MG tablet  Commonly known as: ATIVAN     memantine 10 MG tablet  Commonly known as: SHIMON     Vimpat 50 MG Tabs tablet  Generic drug: lacosamide     Vitamin D3 125 MCG (5000 UT) Tabs           Where to Get Your Medications      Information about where to get these medications is not yet available    Ask your nurse or doctor about these medications  · cefdinir 300 MG capsule  · doxycycline hyclate 100 MG tablet  · white petrolatum Oint ointment  · white petrolatum Oint ointment         Activity: activity as tolerated    Diet: regular diet    Wound Care: as directed    Follow-up:    · This patient is instructed to follow-up with her primary care physician. · Patient is instructed to follow-up with the consults listed above as directed by them. · They are instructed to resume home medications and take new medications as indicated in the list above. · If the patient has a recurrence of symptoms, they are instructed to go to the ED. Preparing for this patient's discharge, including paperwork, orders, instructions, and meeting with patient did require > 30 minutes.     Kike Garcia DO   2:59 PM  6/25/2021

## 2021-06-26 NOTE — PROGRESS NOTES
Physician Progress Note      PATIENT:               Maynor Gerardo  CSN #:                  [id number]  :                       1955  ADMIT DATE:       2021 10:41 PM  Christen Reeder DATE:        2021 4:26 PM  RESPONDING  PROVIDER #:        Ladonna Streeter DO          QUERY TEXT:    Pt admitted with pneumonia  Noted documentation of Sacral pressure ulcer stage   3, present on admission on  by ordered Wound Care Nurse. If possible,   please document in progress notes and discharge summary:    The medical record reflects the following:  Risk Factors: Chronic Illness  Clinical Indicators: Per Wound Care Consult  Pressure ulcer Sacrum: stage   3  Treatment: Wound Care Consult, TAP system, Low air loss bed, aquaphor    Thank you  Debbie ZHANGN, RN, CCDS  Clinical Documentation Improvement  Options provided:  -- Sacral pressure ulcer stage 3 confirmed present on admission  -- Sacral pressure ulcer stage 3 confirmed not present on admission  -- Sacral pressure ulcer stage 3 ruled out  -- Other - I will add my own diagnosis  -- Disagree - Not applicable / Not valid  -- Disagree - Clinically unable to determine / Unknown  -- Refer to Clinical Documentation Reviewer    PROVIDER RESPONSE TEXT:    The diagnosis of Sacral pressure ulcer stage 3 was confirmed as present on   admission.     Query created by: Sarahy Logan on  7:77 AM      Electronically signed by:  Ladonna Streeter DO 2021 12:59 PM

## 2021-08-17 NOTE — PROGRESS NOTES
[facility]. [facility] [place]        Pt Name: Dodson Cogan  YOB: 1955  Date of evaluation: 8/18/2021  Primary Care Physician: Brittnee Stanley MD  Reason for evaluation:   Chief Complaint   Patient presents with    Follow-Up from [place] [address] Patient    Other     Leukopenia        Subjective: Here for follow up after hospitalization. She is nonverbal with advanced dementia and she is in company of her sister who is her primary caregiver. This patient was first seen by Gracia Mann when she was a patient in [address] [place] [address] June 2021. She was admitted after a fall and was transferred here from [facility] after CT revealed bilateral opacities. She was treated with Levaquin and Flagyl   hematology was consulted for thrombocytopenia and mild anemia. She was discharged to [facility] on June 25, 2021. .        OBJECTIVE:  VITALS:  weight is 103 lb (46.7 kg). Her temperature is 96.8 °F (36 °C). Physical Exam:  Performance Status: Well developed, well nourished female  General: Alert, in no acute distress. Head and neck: PERRLA, EOMI. Sclera non icteric. Oropharynx: Clear. Neck: no JVD,  no adenopathy. Heart: Regular rate and regular rhythm. Lungs: Clear to auscultation. Abdomen: Soft, non-tender;.no masses, no organomegaly. Extremities: No edema. Neurologic:Cranial nerves grossly intact. Alert but nonverbal  Skin:  No rash. Medications  Prior to Admission medications    Medication Sig Start Date End Date Taking? Authorizing Provider   white petrolatum OINT ointment Apply topically 2 times daily 6/24/21   Thanh Kulkarni DO   white petrolatum OINT ointment Apply topically 4 times daily as needed (after toileting) 6/24/21   Thanh Kulkarni DO   VIMPAT 50 MG TABS tablet Take 1 tablet by mouth 2 times daily.  1/20/21   Historical Provider, MD   Cholecalciferol (VITAMIN D3) 125 MCG (5000 UT) TABS Take by mouth Historical Provider, MD   atorvastatin (LIPITOR) 80 MG tablet 1 tablet by Per NG tube route nightly 11/30/20   MINA Burch - NP   levETIRAcetam (KEPPRA) 750 MG tablet Take 750 mg by mouth 2 times daily    Historical Provider, MD   LORazepam (ATIVAN) 1 MG tablet Take 1 mg by mouth 2 times daily as needed for Anxiety. Historical Provider, MD   memantine (NAMENDA) 10 MG tablet Take 10 mg by mouth 2 times daily    Historical Provider, MD   donepezil (ARICEPT) 10 MG tablet Take 10 mg by mouth nightly    Historical Provider, MD    Scheduled Meds:  Continuous Infusions:  PRN Meds:.        Recent Laboratory Data-     Lab Results   Component Value Date    WBC 3.6 (L) 06/24/2021    HGB 10.1 (L) 06/24/2021    HCT 32.5 (L) 06/24/2021    MCV 98.8 06/24/2021     (L) 06/24/2021    LYMPHOPCT 36.6 06/23/2021    RBC 3.29 (L) 06/24/2021    MCH 30.7 06/24/2021    MCHC 31.1 (L) 06/24/2021    RDW 14.4 06/24/2021    NEUTOPHILPCT 50.5 06/23/2021    MONOPCT 9.0 06/23/2021    BASOPCT 0.3 06/23/2021    NEUTROABS 1.79 (L) 06/23/2021    LYMPHSABS 1.30 (L) 06/23/2021    MONOSABS 0.32 06/23/2021    EOSABS 0.09 06/23/2021    BASOSABS 0.01 06/23/2021       Lab Results   Component Value Date     06/24/2021    K 4.1 06/24/2021     (H) 06/24/2021    CO2 25 06/24/2021    BUN 6 06/24/2021    CREATININE 0.5 06/24/2021    GLUCOSE 75 06/24/2021    CALCIUM 8.5 (L) 06/24/2021    PROT 5.7 (L) 06/24/2021    LABALBU 2.8 (L) 06/24/2021    BILITOT 0.4 06/24/2021    ALKPHOS 61 06/24/2021    AST 31 06/24/2021    ALT 26 06/24/2021    LABGLOM >60 06/24/2021    GFRAA >60 06/24/2021         Lab Results   Component Value Date    IRON 62 06/21/2021    TIBC 175 (L) 06/21/2021    FERRITIN 434 06/21/2021         No results found for:       No results found for: CEA        Radiology-  No results found.           ASSESSMENT/PLAN :  A 60-year-old woman with dementia  Status post recent fall    Chest x-ray from outside hospital reported bilateral infiltrates and CT scan of abdomen and pelvis also from outside hospital was suggestive of enteritis. She had leukopenia but her repeat CBC today shows normal WBC count.     She has moderate normocytic anemia likely related to myelosuppression by her pneumonia and possible enteritis.     Moderate thrombocytopenia likely related to myelosuppression by infection. Review peripheral blood smear and check iron studies and reticulocyte count  Continue present antibiotics.       6/21/2021  - CBC stable today. Continues with anemia likely related to myelosuppression by her pneumonia and possible enteritis. No leukopenia noted. - Peripheral blood smear pending.  - Retics reviewed. - Iron profile consistent with ACOD/myelosuppression.   - Remains on IV Flagyl  - We will continue to follow. 8/18/2021  Patient refused lab work and was noncooperative    Visiting nurses who follow her at her residence will reattempt drawing her CBC and CMP. Alexandria Tovar. Eddie Noble M.D., F.A.C.P.   Electronically signed 8/18/2021 at 12:41 PM

## 2021-08-18 ENCOUNTER — OFFICE VISIT (OUTPATIENT)
Dept: ONCOLOGY | Age: 66
End: 2021-08-18
Payer: MEDICARE

## 2021-08-18 ENCOUNTER — HOSPITAL ENCOUNTER (OUTPATIENT)
Dept: INFUSION THERAPY | Age: 66
Discharge: HOME | End: 2021-08-18
Payer: MEDICARE

## 2021-08-18 VITALS — TEMPERATURE: 96.8 F | WEIGHT: 103 LBS | BODY MASS INDEX: 17.14 KG/M2

## 2021-08-18 DIAGNOSIS — D72.819 LEUKOPENIA, UNSPECIFIED TYPE: ICD-10-CM

## 2021-08-18 DIAGNOSIS — D72.819 LEUKOPENIA, UNSPECIFIED TYPE: Primary | ICD-10-CM

## 2021-08-18 PROCEDURE — 99214 OFFICE O/P EST MOD 30 MIN: CPT

## 2024-06-20 NOTE — PROGRESS NOTES
Department of Neurosurgery  Progress Note    CHIEF COMPLAINT: s/p bethany hole craniotomy 11/18    SUBJECTIVE:  Asleep, aroused by voice. No new issues overnight. REVIEW OF SYSTEMS :  Unable to obtain.      OBJECTIVE:   VITALS:  BP (!) 163/89   Pulse 62   Temp 96.6 °F (35.9 °C) (Temporal)   Resp 14   Ht 5' 4\" (1.626 m)   Wt 137 lb (62.1 kg)   SpO2 98%   BMI 23.52 kg/m²     PHYSICAL:  Neurologic:  Awake and alert  Motor Exam:  Moving all extremities well  Sensory:  Sensory intact  Incision c/d/i      DATA:  CBC:   Lab Results   Component Value Date    WBC 4.2 11/23/2020    RBC 3.46 11/23/2020    HGB 10.7 11/23/2020    HCT 31.1 11/23/2020    MCV 89.9 11/23/2020    MCH 30.9 11/23/2020    MCHC 34.4 11/23/2020    RDW 12.6 11/23/2020     11/23/2020    MPV 11.6 11/23/2020     BMP:    Lab Results   Component Value Date     11/23/2020    K 4.0 11/23/2020    K 4.1 11/22/2020     11/23/2020    CO2 25 11/23/2020    BUN 8 11/23/2020    LABALBU 2.7 11/22/2020    LABALBU 4.4 05/04/2012    CREATININE 0.4 11/23/2020    CALCIUM 8.8 11/23/2020    GFRAA >60 11/23/2020    LABGLOM >60 11/23/2020    GLUCOSE 69 11/23/2020    GLUCOSE 105 05/04/2012     PT/INR:    Lab Results   Component Value Date    PROTIME 10.8 11/18/2020    INR 1.0 11/18/2020     PTT:    Lab Results   Component Value Date    APTT 34.8 11/18/2020   [APTT}    Current Inpatient Medications  Current Facility-Administered Medications: psyllium (KONSYL) 28.3 % packet 1 packet, 1 packet, Per NG tube, Daily  hydrALAZINE (APRESOLINE) injection 5 mg, 5 mg, Intravenous, Q6H PRN  potassium & sodium phosphates (PHOS-NAK) 280-160-250 MG packet 250 mg, 1 packet, Per NG tube, 4x Daily  atorvastatin (LIPITOR) tablet 80 mg, 80 mg, Per NG tube, Nightly  donepezil (ARICEPT) tablet 10 mg, 10 mg, Per NG tube, Nightly  levETIRAcetam (KEPPRA) 100 MG/ML solution 750 mg, 750 mg, Per NG tube, BID **OR** Levetiracetam (Keppra) 750mg/50ml, 750 mg, Intravenous, BID  memantine 37.2

## (undated) DEVICE — GLOVE SURG SZ 65 THK91MIL LTX FREE SYN POLYISOPRENE

## (undated) DEVICE — 3M™ IOBAN™ 2 ANTIMICROBIAL INCISE DRAPE 6640EZ: Brand: IOBAN™ 2

## (undated) DEVICE — SYRINGE 20ML LL S/C 50

## (undated) DEVICE — TOWEL,OR,DSP,ST,BLUE,DLX,10/PK,8PK/CS: Brand: MEDLINE

## (undated) DEVICE — Z DUP USE 2257490 ADHESIVE SKIN CLSRE 036ML TPCL 2CTL CNCRLTE HIGH VSCSTY DRMB

## (undated) DEVICE — SURGICAL PROCEDURE PACK CRANIOTOMY CUST

## (undated) DEVICE — DOUBLE BASIN SET: Brand: MEDLINE INDUSTRIES, INC.

## (undated) DEVICE — SYRINGE,EAR/ULCER, 3 OZ, STERILE: Brand: MEDLINE

## (undated) DEVICE — GLOVE ORANGE PI 8   MSG9080

## (undated) DEVICE — GAUZE,SPONGE,4"X4",16PLY,XRAY,STRL,LF: Brand: MEDLINE

## (undated) DEVICE — GOWN,SIRUS,FABRNF,XL,20/CS: Brand: MEDLINE

## (undated) DEVICE — SOLUTION IV IRRIG POUR BRL 0.9% SODIUM CHL 2F7124

## (undated) DEVICE — SOLUTION IV IRRIG WATER 1000ML POUR BRL 2F7114

## (undated) DEVICE — NEEDLE HYPO 21GA L1.5IN GRN POLYPR HUB S STL REG BVL STR

## (undated) DEVICE — GLOVE SURG SZ 85 STD WHT LTX SYN POLYMER BEAD REINF ANTI RL

## (undated) DEVICE — READY WET SKIN SCRUB TRAY-LF: Brand: MEDLINE INDUSTRIES, INC.

## (undated) DEVICE — DRAINAGE ACCESSORY KIT: 500 ML DRAINAGE BAG FOR INTRAOPERATIVE DRAINAGE OF CEREBROSPINAL FLUID.: Brand: DRAINAGE ACCESSORY KIT

## (undated) DEVICE — PERFORATOR CRAN AD PED 14/11MM DGR O ROUNDED CUT EDGE DISP

## (undated) DEVICE — SYRINGE,TOOMEY,IRRIGATION,70CC,STERILE: Brand: MEDLINE

## (undated) DEVICE — BLADE CLP TAPR HD WET DRY CAPABILITY GTT IN CHARGING USE

## (undated) DEVICE — 1.5L THIN WALL CAN: Brand: CRD

## (undated) DEVICE — HERMETIC™ LARGE-STYLE VENTRICULAR CATHETER SET: Brand: HERMETIC™

## (undated) DEVICE — CLOTH SURG PREP PREOPERATIVE CHLORHEXIDINE GLUC 2% READYPREP

## (undated) DEVICE — CATHETER,URETHRAL,REDRUBBER,STRL,10FR: Brand: MEDLINE INDUSTRIES, INC.

## (undated) DEVICE — TUBING, SUCTION, 3/16" X 12', STRAIGHT: Brand: MEDLINE

## (undated) DEVICE — APPLICATOR PREP 26ML 0.7% IOD POVACRYLEX 74% ISO ALC ST

## (undated) DEVICE — LABEL MED 4 IN SURG PANEL W/ PEN STRL

## (undated) DEVICE — Device

## (undated) DEVICE — INTENDED FOR TISSUE SEPARATION, AND OTHER PROCEDURES THAT REQUIRE A SHARP SURGICAL BLADE TO PUNCTURE OR CUT.: Brand: BARD-PARKER ® STAINLESS STEEL BLADES